# Patient Record
Sex: FEMALE | Race: WHITE | Employment: UNEMPLOYED | URBAN - METROPOLITAN AREA
[De-identification: names, ages, dates, MRNs, and addresses within clinical notes are randomized per-mention and may not be internally consistent; named-entity substitution may affect disease eponyms.]

---

## 2017-03-20 ENCOUNTER — OFFICE VISIT (OUTPATIENT)
Dept: FAMILY MEDICINE CLINIC | Age: 52
End: 2017-03-20
Payer: MEDICARE

## 2017-03-20 VITALS
SYSTOLIC BLOOD PRESSURE: 106 MMHG | DIASTOLIC BLOOD PRESSURE: 58 MMHG | OXYGEN SATURATION: 100 % | RESPIRATION RATE: 16 BRPM | WEIGHT: 173 LBS | BODY MASS INDEX: 28.82 KG/M2 | HEART RATE: 82 BPM | HEIGHT: 65 IN | TEMPERATURE: 98 F

## 2017-03-20 DIAGNOSIS — Z12.12 SCREENING FOR RECTAL CANCER: ICD-10-CM

## 2017-03-20 DIAGNOSIS — Z12.31 ENCOUNTER FOR MAMMOGRAM TO ESTABLISH BASELINE MAMMOGRAM: ICD-10-CM

## 2017-03-20 DIAGNOSIS — K21.9 GASTROESOPHAGEAL REFLUX DISEASE, ESOPHAGITIS PRESENCE NOT SPECIFIED: Primary | ICD-10-CM

## 2017-03-20 DIAGNOSIS — H91.93 DEAF, BILATERAL: ICD-10-CM

## 2017-03-20 DIAGNOSIS — F32.A DEPRESSION, UNSPECIFIED DEPRESSION TYPE: ICD-10-CM

## 2017-03-20 DIAGNOSIS — Z00.00 HEALTH MAINTENANCE EXAMINATION: ICD-10-CM

## 2017-03-20 DIAGNOSIS — Z13.9 SCREENING: ICD-10-CM

## 2017-03-20 DIAGNOSIS — Z11.59 NEED FOR HEPATITIS C SCREENING TEST: ICD-10-CM

## 2017-03-20 DIAGNOSIS — Z13.220 SCREENING FOR HYPERLIPIDEMIA: ICD-10-CM

## 2017-03-20 LAB — HBA1C MFR BLD: 5.5 %

## 2017-03-20 PROCEDURE — 99203 OFFICE O/P NEW LOW 30 MIN: CPT | Performed by: NURSE PRACTITIONER

## 2017-03-20 PROCEDURE — 1036F TOBACCO NON-USER: CPT | Performed by: NURSE PRACTITIONER

## 2017-03-20 PROCEDURE — 83036 HEMOGLOBIN GLYCOSYLATED A1C: CPT | Performed by: NURSE PRACTITIONER

## 2017-03-20 PROCEDURE — 3014F SCREEN MAMMO DOC REV: CPT | Performed by: NURSE PRACTITIONER

## 2017-03-20 PROCEDURE — G8427 DOCREV CUR MEDS BY ELIG CLIN: HCPCS | Performed by: NURSE PRACTITIONER

## 2017-03-20 PROCEDURE — G8419 CALC BMI OUT NRM PARAM NOF/U: HCPCS | Performed by: NURSE PRACTITIONER

## 2017-03-20 PROCEDURE — G8484 FLU IMMUNIZE NO ADMIN: HCPCS | Performed by: NURSE PRACTITIONER

## 2017-03-20 PROCEDURE — 3017F COLORECTAL CA SCREEN DOC REV: CPT | Performed by: NURSE PRACTITIONER

## 2017-03-20 RX ORDER — FLUOXETINE HYDROCHLORIDE 40 MG/1
CAPSULE ORAL
COMMUNITY
Start: 2016-11-25 | End: 2021-01-11

## 2017-03-20 RX ORDER — OMEPRAZOLE 20 MG/1
20 CAPSULE, DELAYED RELEASE ORAL DAILY
COMMUNITY
End: 2017-08-15 | Stop reason: SDUPTHER

## 2017-03-20 ASSESSMENT — ENCOUNTER SYMPTOMS
DIARRHEA: 0
EYE DISCHARGE: 0
SINUS PRESSURE: 0
ABDOMINAL PAIN: 0
BLOOD IN STOOL: 0
CHEST TIGHTNESS: 0
RHINORRHEA: 0
COUGH: 0
CONSTIPATION: 0
SHORTNESS OF BREATH: 0

## 2017-03-20 ASSESSMENT — PATIENT HEALTH QUESTIONNAIRE - PHQ9
SUM OF ALL RESPONSES TO PHQ9 QUESTIONS 1 & 2: 0
2. FEELING DOWN, DEPRESSED OR HOPELESS: 0
SUM OF ALL RESPONSES TO PHQ QUESTIONS 1-9: 0

## 2017-03-21 ENCOUNTER — HOSPITAL ENCOUNTER (OUTPATIENT)
Age: 52
Setting detail: SPECIMEN
Discharge: HOME | End: 2017-03-21
Payer: MEDICARE

## 2017-03-21 DIAGNOSIS — Z12.31 ENCOUNTER FOR MAMMOGRAM TO ESTABLISH BASELINE MAMMOGRAM: ICD-10-CM

## 2017-03-21 DIAGNOSIS — Z11.59 NEED FOR HEPATITIS C SCREENING TEST: ICD-10-CM

## 2017-03-21 DIAGNOSIS — Z13.220 SCREENING FOR HYPERLIPIDEMIA: ICD-10-CM

## 2017-03-21 DIAGNOSIS — Z13.9 SCREENING: ICD-10-CM

## 2017-03-21 LAB
ALBUMIN SERPL-MCNC: 4.2 G/DL (ref 3.5–5.2)
ALBUMIN/GLOBULIN RATIO: 1.4 (ref 1–2.5)
ALP BLD-CCNC: 85 U/L (ref 35–104)
ALT SERPL-CCNC: 18 U/L (ref 5–33)
ANION GAP SERPL CALCULATED.3IONS-SCNC: 11 MMOL/L (ref 9–17)
AST SERPL-CCNC: 23 U/L
BILIRUB SERPL-MCNC: 0.52 MG/DL (ref 0.3–1.2)
BUN BLDV-MCNC: 9 MG/DL (ref 6–20)
BUN/CREAT BLD: ABNORMAL (ref 9–20)
CALCIUM SERPL-MCNC: 9.3 MG/DL (ref 8.6–10.4)
CHLORIDE BLD-SCNC: 97 MMOL/L (ref 98–107)
CHOLESTEROL/HDL RATIO: 2.7
CHOLESTEROL: 195 MG/DL
CO2: 29 MMOL/L (ref 20–31)
CREAT SERPL-MCNC: 0.61 MG/DL (ref 0.5–0.9)
GFR AFRICAN AMERICAN: >60 ML/MIN
GFR NON-AFRICAN AMERICAN: >60 ML/MIN
GFR SERPL CREATININE-BSD FRML MDRD: ABNORMAL ML/MIN/{1.73_M2}
GFR SERPL CREATININE-BSD FRML MDRD: ABNORMAL ML/MIN/{1.73_M2}
GLUCOSE BLD-MCNC: 87 MG/DL (ref 70–99)
HCT VFR BLD CALC: 35.5 % (ref 36–46)
HDLC SERPL-MCNC: 72 MG/DL
HEMOGLOBIN: 12.3 G/DL (ref 12–16)
HEPATITIS C ANTIBODY: NONREACTIVE
LDL CHOLESTEROL: 110 MG/DL (ref 0–130)
MCH RBC QN AUTO: 29.5 PG (ref 26–34)
MCHC RBC AUTO-ENTMCNC: 34.5 G/DL (ref 31–37)
MCV RBC AUTO: 85.6 FL (ref 80–100)
PDW BLD-RTO: 12.6 % (ref 12.5–15.4)
PLATELET # BLD: 263 K/UL (ref 140–450)
PMV BLD AUTO: 8 FL (ref 6–12)
POTASSIUM SERPL-SCNC: 4.4 MMOL/L (ref 3.7–5.3)
RBC # BLD: 4.15 M/UL (ref 4–5.2)
SODIUM BLD-SCNC: 137 MMOL/L (ref 135–144)
TOTAL PROTEIN: 7.3 G/DL (ref 6.4–8.3)
TRIGL SERPL-MCNC: 63 MG/DL
TSH SERPL DL<=0.05 MIU/L-ACNC: 1.94 MIU/L (ref 0.3–5)
VLDLC SERPL CALC-MCNC: NORMAL MG/DL (ref 1–30)
WBC # BLD: 5.3 K/UL (ref 3.5–11)

## 2017-03-24 DIAGNOSIS — Z12.12 SCREENING FOR RECTAL CANCER: ICD-10-CM

## 2017-03-24 LAB
CONTROL: PRESENT
HEMOCCULT STL QL: NEGATIVE

## 2017-03-24 PROCEDURE — 82274 ASSAY TEST FOR BLOOD FECAL: CPT | Performed by: NURSE PRACTITIONER

## 2017-04-06 ENCOUNTER — HOSPITAL ENCOUNTER (OUTPATIENT)
Dept: MAMMOGRAPHY | Age: 52
Discharge: HOME | End: 2017-04-06
Payer: MEDICARE

## 2017-04-06 PROCEDURE — G0202 SCR MAMMO BI INCL CAD: HCPCS

## 2017-05-01 PROBLEM — H91.90 DEAF: Status: ACTIVE | Noted: 2017-05-01

## 2017-05-15 ENCOUNTER — OFFICE VISIT (OUTPATIENT)
Dept: FAMILY MEDICINE CLINIC | Age: 52
End: 2017-05-15
Payer: MEDICARE

## 2017-05-15 VITALS
BODY MASS INDEX: 29.26 KG/M2 | HEIGHT: 65 IN | OXYGEN SATURATION: 100 % | RESPIRATION RATE: 18 BRPM | HEART RATE: 85 BPM | DIASTOLIC BLOOD PRESSURE: 69 MMHG | WEIGHT: 175.6 LBS | SYSTOLIC BLOOD PRESSURE: 112 MMHG

## 2017-05-15 DIAGNOSIS — M79.645 PAIN IN FINGER OF BOTH HANDS: ICD-10-CM

## 2017-05-15 DIAGNOSIS — M79.644 PAIN IN FINGER OF BOTH HANDS: ICD-10-CM

## 2017-05-15 DIAGNOSIS — K21.9 GASTROESOPHAGEAL REFLUX DISEASE, ESOPHAGITIS PRESENCE NOT SPECIFIED: Primary | ICD-10-CM

## 2017-05-15 PROCEDURE — 1036F TOBACCO NON-USER: CPT | Performed by: NURSE PRACTITIONER

## 2017-05-15 PROCEDURE — 99214 OFFICE O/P EST MOD 30 MIN: CPT | Performed by: NURSE PRACTITIONER

## 2017-05-15 PROCEDURE — G8419 CALC BMI OUT NRM PARAM NOF/U: HCPCS | Performed by: NURSE PRACTITIONER

## 2017-05-15 PROCEDURE — 3017F COLORECTAL CA SCREEN DOC REV: CPT | Performed by: NURSE PRACTITIONER

## 2017-05-15 PROCEDURE — G8427 DOCREV CUR MEDS BY ELIG CLIN: HCPCS | Performed by: NURSE PRACTITIONER

## 2017-05-15 PROCEDURE — 3014F SCREEN MAMMO DOC REV: CPT | Performed by: NURSE PRACTITIONER

## 2017-05-15 RX ORDER — MELOXICAM 7.5 MG/1
7.5 TABLET ORAL DAILY
Qty: 30 TABLET | Refills: 3 | Status: SHIPPED | OUTPATIENT
Start: 2017-05-15 | End: 2017-08-15 | Stop reason: SDUPTHER

## 2017-05-15 RX ORDER — RANITIDINE 150 MG/1
150 TABLET ORAL NIGHTLY
Qty: 30 TABLET | Refills: 3 | Status: SHIPPED | OUTPATIENT
Start: 2017-05-15 | End: 2017-08-15 | Stop reason: SDUPTHER

## 2017-05-15 ASSESSMENT — ENCOUNTER SYMPTOMS
CONSTIPATION: 0
RHINORRHEA: 0
DIARRHEA: 0
EYE DISCHARGE: 0
CHEST TIGHTNESS: 0
SHORTNESS OF BREATH: 0
COUGH: 0
ABDOMINAL PAIN: 0
BLOOD IN STOOL: 0
SINUS PRESSURE: 0

## 2017-08-15 ENCOUNTER — OFFICE VISIT (OUTPATIENT)
Dept: FAMILY MEDICINE CLINIC | Age: 52
End: 2017-08-15
Payer: MEDICARE

## 2017-08-15 VITALS
DIASTOLIC BLOOD PRESSURE: 67 MMHG | TEMPERATURE: 97.1 F | HEIGHT: 65 IN | RESPIRATION RATE: 18 BRPM | SYSTOLIC BLOOD PRESSURE: 111 MMHG | HEART RATE: 88 BPM | OXYGEN SATURATION: 98 % | BODY MASS INDEX: 29.16 KG/M2 | WEIGHT: 175 LBS

## 2017-08-15 DIAGNOSIS — Z76.0 MEDICATION REFILL: ICD-10-CM

## 2017-08-15 DIAGNOSIS — K21.9 GASTROESOPHAGEAL REFLUX DISEASE, ESOPHAGITIS PRESENCE NOT SPECIFIED: Primary | ICD-10-CM

## 2017-08-15 PROCEDURE — 3014F SCREEN MAMMO DOC REV: CPT | Performed by: NURSE PRACTITIONER

## 2017-08-15 PROCEDURE — 3017F COLORECTAL CA SCREEN DOC REV: CPT | Performed by: NURSE PRACTITIONER

## 2017-08-15 PROCEDURE — 1036F TOBACCO NON-USER: CPT | Performed by: NURSE PRACTITIONER

## 2017-08-15 PROCEDURE — G8427 DOCREV CUR MEDS BY ELIG CLIN: HCPCS | Performed by: NURSE PRACTITIONER

## 2017-08-15 PROCEDURE — 99213 OFFICE O/P EST LOW 20 MIN: CPT | Performed by: NURSE PRACTITIONER

## 2017-08-15 PROCEDURE — G8419 CALC BMI OUT NRM PARAM NOF/U: HCPCS | Performed by: NURSE PRACTITIONER

## 2017-08-15 RX ORDER — OMEPRAZOLE 40 MG/1
40 CAPSULE, DELAYED RELEASE ORAL DAILY
Qty: 30 CAPSULE | Refills: 2 | Status: SHIPPED
Start: 2017-08-15 | End: 2020-02-24 | Stop reason: DRUGHIGH

## 2017-08-15 RX ORDER — MELOXICAM 7.5 MG/1
7.5 TABLET ORAL DAILY
Qty: 30 TABLET | Refills: 3 | Status: SHIPPED | OUTPATIENT
Start: 2017-08-15 | End: 2020-01-22

## 2017-08-15 RX ORDER — RANITIDINE 150 MG/1
150 TABLET ORAL NIGHTLY
Qty: 30 TABLET | Refills: 3 | Status: SHIPPED | OUTPATIENT
Start: 2017-08-15 | End: 2020-01-22

## 2017-08-15 ASSESSMENT — ENCOUNTER SYMPTOMS
CONSTIPATION: 0
RHINORRHEA: 0
SHORTNESS OF BREATH: 0
SINUS PRESSURE: 0
COUGH: 0
DIARRHEA: 0
BLOOD IN STOOL: 0
ABDOMINAL PAIN: 0
CHEST TIGHTNESS: 0

## 2017-08-23 ENCOUNTER — OFFICE VISIT (OUTPATIENT)
Dept: GASTROENTEROLOGY | Age: 52
End: 2017-08-23
Payer: MEDICARE

## 2017-08-23 VITALS
OXYGEN SATURATION: 97 % | SYSTOLIC BLOOD PRESSURE: 111 MMHG | HEART RATE: 92 BPM | BODY MASS INDEX: 28.82 KG/M2 | WEIGHT: 173 LBS | DIASTOLIC BLOOD PRESSURE: 70 MMHG | TEMPERATURE: 97.7 F

## 2017-08-23 DIAGNOSIS — Z12.11 SCREEN FOR COLON CANCER: Primary | ICD-10-CM

## 2017-08-23 DIAGNOSIS — K21.9 GASTROESOPHAGEAL REFLUX DISEASE, ESOPHAGITIS PRESENCE NOT SPECIFIED: ICD-10-CM

## 2017-08-23 PROCEDURE — 3014F SCREEN MAMMO DOC REV: CPT | Performed by: INTERNAL MEDICINE

## 2017-08-23 PROCEDURE — 1036F TOBACCO NON-USER: CPT | Performed by: INTERNAL MEDICINE

## 2017-08-23 PROCEDURE — G8427 DOCREV CUR MEDS BY ELIG CLIN: HCPCS | Performed by: INTERNAL MEDICINE

## 2017-08-23 PROCEDURE — 3017F COLORECTAL CA SCREEN DOC REV: CPT | Performed by: INTERNAL MEDICINE

## 2017-08-23 PROCEDURE — 99204 OFFICE O/P NEW MOD 45 MIN: CPT | Performed by: INTERNAL MEDICINE

## 2017-08-23 PROCEDURE — G8419 CALC BMI OUT NRM PARAM NOF/U: HCPCS | Performed by: INTERNAL MEDICINE

## 2017-08-23 ASSESSMENT — ENCOUNTER SYMPTOMS
BLOOD IN STOOL: 0
VOMITING: 0
CONSTIPATION: 0
BACK PAIN: 1
ANAL BLEEDING: 0
NAUSEA: 0
DIARRHEA: 0
RESPIRATORY NEGATIVE: 1
ABDOMINAL DISTENTION: 0
TROUBLE SWALLOWING: 0
ABDOMINAL PAIN: 1

## 2017-08-24 RX ORDER — POLYETHYLENE GLYCOL 3350 17 G/17G
POWDER, FOR SOLUTION ORAL
Qty: 255 G | Refills: 0 | Status: SHIPPED | OUTPATIENT
Start: 2017-08-24 | End: 2017-09-22

## 2017-09-20 ENCOUNTER — ANESTHESIA EVENT (OUTPATIENT)
Dept: OPERATING ROOM | Age: 52
End: 2017-09-20
Payer: MEDICARE

## 2017-09-21 ENCOUNTER — HOSPITAL ENCOUNTER (OUTPATIENT)
Age: 52
Setting detail: OUTPATIENT SURGERY
Discharge: HOME | End: 2017-09-21
Attending: INTERNAL MEDICINE | Admitting: INTERNAL MEDICINE
Payer: MEDICARE

## 2017-09-21 ENCOUNTER — ANESTHESIA (OUTPATIENT)
Dept: OPERATING ROOM | Age: 52
End: 2017-09-21
Payer: MEDICARE

## 2017-09-21 VITALS
RESPIRATION RATE: 14 BRPM | DIASTOLIC BLOOD PRESSURE: 63 MMHG | OXYGEN SATURATION: 100 % | SYSTOLIC BLOOD PRESSURE: 111 MMHG

## 2017-09-21 VITALS
BODY MASS INDEX: 27.81 KG/M2 | DIASTOLIC BLOOD PRESSURE: 58 MMHG | HEIGHT: 65 IN | WEIGHT: 166.89 LBS | OXYGEN SATURATION: 100 % | SYSTOLIC BLOOD PRESSURE: 107 MMHG | RESPIRATION RATE: 13 BRPM | TEMPERATURE: 97.3 F | HEART RATE: 70 BPM

## 2017-09-21 PROCEDURE — 6360000002 HC RX W HCPCS: Performed by: NURSE ANESTHETIST, CERTIFIED REGISTERED

## 2017-09-21 PROCEDURE — 45380 COLONOSCOPY AND BIOPSY: CPT | Performed by: INTERNAL MEDICINE

## 2017-09-21 PROCEDURE — 88305 TISSUE EXAM BY PATHOLOGIST: CPT

## 2017-09-21 PROCEDURE — 3700000001 HC ADD 15 MINUTES (ANESTHESIA): Performed by: INTERNAL MEDICINE

## 2017-09-21 PROCEDURE — 3609012400 HC EGD TRANSORAL BIOPSY SINGLE/MULTIPLE: Performed by: INTERNAL MEDICINE

## 2017-09-21 PROCEDURE — 7100000010 HC PHASE II RECOVERY - FIRST 15 MIN: Performed by: INTERNAL MEDICINE

## 2017-09-21 PROCEDURE — 3609010400 HC COLONOSCOPY POLYPECTOMY HOT BIOPSY: Performed by: INTERNAL MEDICINE

## 2017-09-21 PROCEDURE — 7100000011 HC PHASE II RECOVERY - ADDTL 15 MIN: Performed by: INTERNAL MEDICINE

## 2017-09-21 PROCEDURE — 2500000003 HC RX 250 WO HCPCS: Performed by: NURSE ANESTHETIST, CERTIFIED REGISTERED

## 2017-09-21 PROCEDURE — 43235 EGD DIAGNOSTIC BRUSH WASH: CPT | Performed by: INTERNAL MEDICINE

## 2017-09-21 PROCEDURE — 2580000003 HC RX 258: Performed by: ANESTHESIOLOGY

## 2017-09-21 PROCEDURE — 3700000000 HC ANESTHESIA ATTENDED CARE: Performed by: INTERNAL MEDICINE

## 2017-09-21 RX ORDER — FENTANYL CITRATE 50 UG/ML
25 INJECTION, SOLUTION INTRAMUSCULAR; INTRAVENOUS EVERY 5 MIN PRN
Status: DISCONTINUED | OUTPATIENT
Start: 2017-09-21 | End: 2017-09-21 | Stop reason: HOSPADM

## 2017-09-21 RX ORDER — LIDOCAINE HYDROCHLORIDE 20 MG/ML
INJECTION, SOLUTION EPIDURAL; INFILTRATION; INTRACAUDAL; PERINEURAL PRN
Status: DISCONTINUED | OUTPATIENT
Start: 2017-09-21 | End: 2017-09-21

## 2017-09-21 RX ORDER — SODIUM CHLORIDE 0.9 % (FLUSH) 0.9 %
10 SYRINGE (ML) INJECTION PRN
Status: DISCONTINUED | OUTPATIENT
Start: 2017-09-21 | End: 2017-09-21 | Stop reason: HOSPADM

## 2017-09-21 RX ORDER — LIDOCAINE HYDROCHLORIDE 20 MG/ML
INJECTION, SOLUTION EPIDURAL; INFILTRATION; INTRACAUDAL; PERINEURAL PRN
Status: DISCONTINUED | OUTPATIENT
Start: 2017-09-21 | End: 2017-09-21 | Stop reason: SDUPTHER

## 2017-09-21 RX ORDER — MEPERIDINE HYDROCHLORIDE 25 MG/ML
12.5 INJECTION INTRAMUSCULAR; INTRAVENOUS; SUBCUTANEOUS EVERY 5 MIN PRN
Status: DISCONTINUED | OUTPATIENT
Start: 2017-09-21 | End: 2017-09-21 | Stop reason: HOSPADM

## 2017-09-21 RX ORDER — LABETALOL HYDROCHLORIDE 5 MG/ML
5 INJECTION, SOLUTION INTRAVENOUS EVERY 10 MIN PRN
Status: DISCONTINUED | OUTPATIENT
Start: 2017-09-21 | End: 2017-09-21 | Stop reason: HOSPADM

## 2017-09-21 RX ORDER — SODIUM CHLORIDE, SODIUM LACTATE, POTASSIUM CHLORIDE, CALCIUM CHLORIDE 600; 310; 30; 20 MG/100ML; MG/100ML; MG/100ML; MG/100ML
INJECTION, SOLUTION INTRAVENOUS CONTINUOUS
Status: DISCONTINUED | OUTPATIENT
Start: 2017-09-21 | End: 2017-09-21 | Stop reason: HOSPADM

## 2017-09-21 RX ORDER — SODIUM CHLORIDE 0.9 % (FLUSH) 0.9 %
10 SYRINGE (ML) INJECTION EVERY 12 HOURS SCHEDULED
Status: DISCONTINUED | OUTPATIENT
Start: 2017-09-21 | End: 2017-09-21 | Stop reason: HOSPADM

## 2017-09-21 RX ORDER — LIDOCAINE HYDROCHLORIDE 10 MG/ML
1 INJECTION, SOLUTION EPIDURAL; INFILTRATION; INTRACAUDAL; PERINEURAL
Status: DISCONTINUED | OUTPATIENT
Start: 2017-09-21 | End: 2017-09-21 | Stop reason: HOSPADM

## 2017-09-21 RX ORDER — FENTANYL CITRATE 50 UG/ML
INJECTION, SOLUTION INTRAMUSCULAR; INTRAVENOUS PRN
Status: DISCONTINUED | OUTPATIENT
Start: 2017-09-21 | End: 2017-09-21 | Stop reason: SDUPTHER

## 2017-09-21 RX ORDER — HYDROMORPHONE HCL 110MG/55ML
0.5 PATIENT CONTROLLED ANALGESIA SYRINGE INTRAVENOUS EVERY 5 MIN PRN
Status: DISCONTINUED | OUTPATIENT
Start: 2017-09-21 | End: 2017-09-21 | Stop reason: HOSPADM

## 2017-09-21 RX ORDER — ONDANSETRON 2 MG/ML
4 INJECTION INTRAMUSCULAR; INTRAVENOUS
Status: DISCONTINUED | OUTPATIENT
Start: 2017-09-21 | End: 2017-09-21 | Stop reason: HOSPADM

## 2017-09-21 RX ORDER — SODIUM CHLORIDE 9 MG/ML
INJECTION, SOLUTION INTRAVENOUS CONTINUOUS
Status: DISCONTINUED | OUTPATIENT
Start: 2017-09-21 | End: 2017-09-21

## 2017-09-21 RX ORDER — PROPOFOL 10 MG/ML
INJECTION, EMULSION INTRAVENOUS PRN
Status: DISCONTINUED | OUTPATIENT
Start: 2017-09-21 | End: 2017-09-21 | Stop reason: SDUPTHER

## 2017-09-21 RX ADMIN — PROPOFOL 50 MG: 10 INJECTION, EMULSION INTRAVENOUS at 09:38

## 2017-09-21 RX ADMIN — FENTANYL CITRATE 25 MCG: 50 INJECTION, SOLUTION INTRAMUSCULAR; INTRAVENOUS at 09:29

## 2017-09-21 RX ADMIN — SODIUM CHLORIDE, POTASSIUM CHLORIDE, SODIUM LACTATE AND CALCIUM CHLORIDE: 600; 310; 30; 20 INJECTION, SOLUTION INTRAVENOUS at 09:22

## 2017-09-21 RX ADMIN — FENTANYL CITRATE 50 MCG: 50 INJECTION, SOLUTION INTRAMUSCULAR; INTRAVENOUS at 09:24

## 2017-09-21 RX ADMIN — FENTANYL CITRATE 25 MCG: 50 INJECTION, SOLUTION INTRAMUSCULAR; INTRAVENOUS at 09:34

## 2017-09-21 RX ADMIN — PROPOFOL 50 MG: 10 INJECTION, EMULSION INTRAVENOUS at 09:45

## 2017-09-21 RX ADMIN — PROPOFOL 50 MG: 10 INJECTION, EMULSION INTRAVENOUS at 09:32

## 2017-09-21 RX ADMIN — LIDOCAINE HYDROCHLORIDE 100 MG: 20 INJECTION, SOLUTION EPIDURAL; INFILTRATION; INTRACAUDAL; PERINEURAL at 09:24

## 2017-09-21 RX ADMIN — PROPOFOL 100 MG: 10 INJECTION, EMULSION INTRAVENOUS at 09:24

## 2017-09-21 ASSESSMENT — PAIN DESCRIPTION - ORIENTATION
ORIENTATION: UPPER

## 2017-09-21 ASSESSMENT — PAIN DESCRIPTION - LOCATION
LOCATION: ABDOMEN
LOCATION: ABDOMEN
LOCATION: RECTUM
LOCATION: ABDOMEN

## 2017-09-21 ASSESSMENT — PAIN DESCRIPTION - DESCRIPTORS
DESCRIPTORS: BURNING

## 2017-09-21 ASSESSMENT — PAIN SCALES - GENERAL
PAINLEVEL_OUTOF10: 0
PAINLEVEL_OUTOF10: 4
PAINLEVEL_OUTOF10: 1
PAINLEVEL_OUTOF10: 4
PAINLEVEL_OUTOF10: 4

## 2017-09-21 ASSESSMENT — PAIN - FUNCTIONAL ASSESSMENT: PAIN_FUNCTIONAL_ASSESSMENT: 0-10

## 2017-09-21 ASSESSMENT — PAIN DESCRIPTION - PAIN TYPE
TYPE: SURGICAL PAIN

## 2017-09-22 LAB — SURGICAL PATHOLOGY REPORT: NORMAL

## 2020-01-22 ENCOUNTER — OFFICE VISIT (OUTPATIENT)
Dept: FAMILY MEDICINE CLINIC | Age: 55
End: 2020-01-22
Payer: MEDICARE

## 2020-01-22 VITALS
WEIGHT: 174.4 LBS | HEART RATE: 84 BPM | OXYGEN SATURATION: 97 % | BODY MASS INDEX: 29.02 KG/M2 | SYSTOLIC BLOOD PRESSURE: 122 MMHG | DIASTOLIC BLOOD PRESSURE: 70 MMHG | TEMPERATURE: 98.2 F

## 2020-01-22 PROCEDURE — G8419 CALC BMI OUT NRM PARAM NOF/U: HCPCS | Performed by: NURSE PRACTITIONER

## 2020-01-22 PROCEDURE — 99214 OFFICE O/P EST MOD 30 MIN: CPT | Performed by: NURSE PRACTITIONER

## 2020-01-22 PROCEDURE — 4004F PT TOBACCO SCREEN RCVD TLK: CPT | Performed by: NURSE PRACTITIONER

## 2020-01-22 PROCEDURE — G8484 FLU IMMUNIZE NO ADMIN: HCPCS | Performed by: NURSE PRACTITIONER

## 2020-01-22 PROCEDURE — 3017F COLORECTAL CA SCREEN DOC REV: CPT | Performed by: NURSE PRACTITIONER

## 2020-01-22 PROCEDURE — G8427 DOCREV CUR MEDS BY ELIG CLIN: HCPCS | Performed by: NURSE PRACTITIONER

## 2020-01-22 RX ORDER — AMITRIPTYLINE HYDROCHLORIDE 25 MG/1
25 TABLET, FILM COATED ORAL NIGHTLY
Qty: 30 TABLET | Refills: 3 | Status: SHIPPED | OUTPATIENT
Start: 2020-01-22 | End: 2020-02-24 | Stop reason: SDUPTHER

## 2020-01-22 ASSESSMENT — PATIENT HEALTH QUESTIONNAIRE - PHQ9
SUM OF ALL RESPONSES TO PHQ QUESTIONS 1-9: 0
SUM OF ALL RESPONSES TO PHQ9 QUESTIONS 1 & 2: 0
1. LITTLE INTEREST OR PLEASURE IN DOING THINGS: 0
2. FEELING DOWN, DEPRESSED OR HOPELESS: 0
SUM OF ALL RESPONSES TO PHQ QUESTIONS 1-9: 0

## 2020-01-22 NOTE — PROGRESS NOTES
Patient is present complaining with  of hand pain x10 months  Patient states they are cold all the time and has tinglinging    Patient states she has had a cough   states she has always had this cough, few years  States she does not like to use an inhaler    Pharmacy and medication reviewed with patient

## 2020-01-22 NOTE — PROGRESS NOTES
9/21/2017    EGD BIOPSY performed by Donell De La Paz MD at [address]  09/21/2017    mild esophagitis     Family History   Problem Relation Age of Onset    Cancer Mother         throat    Cancer Father         colon- advanced age   Jennifer Muff Cancer Sister         hodgkins     Social History     Tobacco Use    Smoking status: Never Smoker   Substance Use Topics    Alcohol use: No      No Known Allergies    Subjective:      Review of Systems   Constitutional: Negative for chills and fever. Respiratory: Negative for cough and shortness of breath. Cardiovascular: Negative for chest pain, palpitations and leg swelling. Musculoskeletal: Positive for arthralgias. Neurological: Positive for headaches. Other pertinent ROS in HPI  Objective:     /70 (Site: Left Upper Arm, Position: Sitting, Cuff Size: Large Adult)   Pulse 84   Temp 98.2 °F (36.8 °C) (Oral)   Wt 174 lb 6.4 oz (79.1 kg)   SpO2 97%   BMI 29.02 kg/m²    Physical Exam  Constitutional:       Appearance: She is well-developed. HENT:      Right Ear: External ear normal.      Left Ear: External ear normal.      Nose: Nose normal.   Neck:      Musculoskeletal: Full passive range of motion without pain and normal range of motion. Cardiovascular:      Rate and Rhythm: Normal rate and regular rhythm. Heart sounds: Normal heart sounds, S1 normal and S2 normal.   Pulmonary:      Effort: Pulmonary effort is normal. No respiratory distress. Breath sounds: Normal breath sounds. Musculoskeletal: Normal range of motion. General: No deformity. Skin:     General: Skin is warm and dry. Neurological:      Mental Status: She is alert and oriented to person, place, and time. Comments: II: Normal tracking, no evidence of hemianopia or other visual field defect. III, IV, & VI: EOM intact, no ptosis, no nystagmus seen. Pupils are equal and reactive to light.    V: Facial sensation is intact to light touch/temperature. VII: no facial asymmetry, facial movement intact. VIII: hearing is abnormal, pt is deaf  IX-X: Palate elevates in the midline. XI: Normal trapezius strength and/or movement. XII: Tongue movement is normal, position is midline and no fasciculations are observed. Assessment/PLAN   1. Chronic intractable headache, unspecified headache type  rto in 1 month   - amitriptyline (ELAVIL) 25 MG tablet; Take 1 tablet by mouth nightly  Dispense: 30 tablet; Refill: 3    2. Encounter for screening mammogram for breast cancer    - [facility] Digital Screen Bilateral [[id number]]; Future    3. Bilateral deafness      4. Bilateral hand pain    - amitriptyline (ELAVIL) 25 MG tablet; Take 1 tablet by mouth nightly  Dispense: 30 tablet; Refill: 3      RTO if symptoms worsen or fail to improve  Pt agreeable with plan      Patient given educational materials - see patientinstructions. Discussed use, benefit, and side effects of prescribed medications. All patient questions answered. Pt voiced understanding. Reviewed health maintenance. Instructed to continue current medications, diet andexercise. 1.  Theodore Marcial received counseling on the following healthy behaviors: nutrition, exercise and medication adherence  2. Patient given educationalmaterials when available - see patient instructions when applicable  3. Discussed use, benefit, and side effects of prescribed medications. Barriersto medication compliance addressed. All patient questions answered. Pt voiced understanding. 4.  Reviewed prior labs and health maintenance when applicable. 5.  Continue current medications, diet and exercise.     CompletedRefills   Requested Prescriptions     Signed Prescriptions Disp Refills    amitriptyline (ELAVIL) 25 MG tablet 30 tablet 3     Sig: Take 1 tablet by mouth nightly         Electronically signed by Re Layton CNP on 2/2/2020 at 4:38 PM

## 2020-02-02 ASSESSMENT — ENCOUNTER SYMPTOMS
SHORTNESS OF BREATH: 0
COUGH: 0

## 2020-02-24 ENCOUNTER — OFFICE VISIT (OUTPATIENT)
Dept: FAMILY MEDICINE CLINIC | Age: 55
End: 2020-02-24
Payer: MEDICARE

## 2020-02-24 VITALS
DIASTOLIC BLOOD PRESSURE: 74 MMHG | BODY MASS INDEX: 30.7 KG/M2 | HEART RATE: 111 BPM | TEMPERATURE: 97.1 F | SYSTOLIC BLOOD PRESSURE: 126 MMHG | WEIGHT: 179.8 LBS | HEIGHT: 64 IN | OXYGEN SATURATION: 98 %

## 2020-02-24 PROCEDURE — G8417 CALC BMI ABV UP PARAM F/U: HCPCS | Performed by: NURSE PRACTITIONER

## 2020-02-24 PROCEDURE — 99213 OFFICE O/P EST LOW 20 MIN: CPT | Performed by: NURSE PRACTITIONER

## 2020-02-24 PROCEDURE — 3017F COLORECTAL CA SCREEN DOC REV: CPT | Performed by: NURSE PRACTITIONER

## 2020-02-24 PROCEDURE — G8427 DOCREV CUR MEDS BY ELIG CLIN: HCPCS | Performed by: NURSE PRACTITIONER

## 2020-02-24 PROCEDURE — 4004F PT TOBACCO SCREEN RCVD TLK: CPT | Performed by: NURSE PRACTITIONER

## 2020-02-24 PROCEDURE — G8484 FLU IMMUNIZE NO ADMIN: HCPCS | Performed by: NURSE PRACTITIONER

## 2020-02-24 RX ORDER — OMEPRAZOLE 20 MG/1
CAPSULE, DELAYED RELEASE ORAL
COMMUNITY
Start: 2020-02-06 | End: 2021-01-11 | Stop reason: SDUPTHER

## 2020-02-24 RX ORDER — OLOPATADINE HYDROCHLORIDE 1 MG/ML
1 SOLUTION/ DROPS OPHTHALMIC 2 TIMES DAILY
Qty: 1 BOTTLE | Refills: 2 | Status: SHIPPED | OUTPATIENT
Start: 2020-02-24 | End: 2020-03-25

## 2020-02-24 RX ORDER — AMITRIPTYLINE HYDROCHLORIDE 50 MG/1
50 TABLET, FILM COATED ORAL NIGHTLY
Qty: 30 TABLET | Refills: 5 | Status: SHIPPED | OUTPATIENT
Start: 2020-02-24 | End: 2020-10-23 | Stop reason: SDUPTHER

## 2020-02-24 ASSESSMENT — ENCOUNTER SYMPTOMS
SHORTNESS OF BREATH: 0
COUGH: 0

## 2020-02-24 NOTE — PROGRESS NOTES
[address] [place]  [phone]    Pedro Gibbs is a 54 y.o. female who presents today for her  medical conditions/complaints as noted below. Pedro Gibbs is c/o of 1 Month Follow-Up and Headache  . HPI:     HPI     All communication done through interpretor. Headaches still occurring at night. They are somewhat improved. Headaches- happening at night. Has tried ibuprofen, this is not helping. Headaches for many months. Head hearts in the back , sometimes her nose starts to hurt and it goes up into her head. They are gone in the morning- seem to occur in the evenings. Eye exam is up to date. No further neurological complaints. Drinks coke all day long. Left eye hurts. Cousin statse it happens often, they bought otc pink eye meds, this helps it go away but it comes back. No drainage. It is dry, it does itch. It goes back and forth between right and left eyes . Sleep Apnea Screening Questionnaire ([facility])    Do you SNORE loudly (louder than talking or loud enough to be heard through closed doors)? YES / NO: No  Do you often feel TIRED, fatigued, or sleepy during daytime? YES / NO: No  Has anyone OBSERVED you stop breathing during your sleep? YES / NO: No  Do you have or are you being treated for high blood PRESSURE? YES / NO: No  BMI more than 35kg/m2? YES / NO: No  AGE over 48years old? YES / NO: Yes  NECK circumference > 16 inches (40cm)? YES / NO: No  GENDER: Male? YES / NO: No    Total score      2      Nursing note reviewed and discussed with patient. Patient's medications, allergies, past medical, surgical, social and family histories were reviewed and updated asappropriate.     Current Outpatient Medications   Medication Sig Dispense Refill    omeprazole (PRILOSEC) 20 MG delayed release capsule       amitriptyline (ELAVIL) 50 MG tablet Take 1 tablet by mouth nightly 30 tablet 5    olopatadine (PATANOL) 0.1 % ophthalmic solution Place 1 drop into both eyes 2 times daily 1 Bottle 2    FLUoxetine (PROZAC) 40 MG capsule TAKE ONE CAPSULE BY MOUTH DAILY       No current facility-administered medications for this visit. Past Medical History:   Diagnosis Date    Asthma     Deaf     Depression     GERD (gastroesophageal reflux disease)       Past Surgical History:   Procedure Laterality Date    CARPAL TUNNEL RELEASE Bilateral     COLONOSCOPY      COLONOSCOPY  09/21/2017     The mucosal exam was normal. The left colon was tortuous. A 4 mm polyp was removed from transverse colon by cold biopsy forceps    HYSTERECTOMY      [facility] COLSC FLX W/REMOVAL LESION BY HOT BX FORCEPS N/A 9/21/2017    COLONOSCOPY POLYPECTOMY COLD BIOPSY performed by Duane Villalobos MD at [address] EGD TRANSORAL BIOPSY SINGLE/MULTIPLE N/A 9/21/2017    EGD BIOPSY performed by Duane Villalobos MD at [address] [facility]  09/21/2017    mild esophagitis     Family History   Problem Relation Age of Onset    Cancer Mother         throat    Cancer Father         colon- advanced age   Adelene Slope Cancer Sister         hodgkins     Social History     Tobacco Use    Smoking status: Never Smoker   Substance Use Topics    Alcohol use: No      No Known Allergies    Subjective:      Review of Systems   Constitutional: Negative for chills and fever. Respiratory: Negative for cough and shortness of breath. Cardiovascular: Negative for chest pain, palpitations and leg swelling. Neurological: Positive for headaches. Other pertinent ROS in HPI  Objective:     /74 (Site: Left Upper Arm, Position: Sitting, Cuff Size: Large Adult)   Pulse 111   Temp 97.1 °F (36.2 °C) (Oral)   Ht 5' 4\" (1.626 m)   Wt 179 lb 12.8 oz (81.6 kg)   SpO2 98%   BMI 30.86 kg/m²    Physical Exam  Constitutional:       Appearance: She is well-developed.    HENT:      Right Ear: External ear normal.      Left Ear: medications. All patient questions answered. Pt voiced understanding. Reviewed health maintenance. Instructed to continue current medications, diet andexercise. 1.  Sofy Matthews received counseling on the following healthy behaviors: nutrition, exercise and medication adherence  2. Patient given educationalmaterials when available - see patient instructions when applicable  3. Discussed use, benefit, and side effects of prescribed medications. Barriersto medication compliance addressed. All patient questions answered. Pt voiced understanding. 4.  Reviewed prior labs and health maintenance when applicable. 5.  Continue current medications, diet and exercise. CompletedRefills   Requested Prescriptions     Signed Prescriptions Disp Refills    amitriptyline (ELAVIL) 50 MG tablet 30 tablet 5     Sig: Take 1 tablet by mouth nightly    olopatadine (PATANOL) 0.1 % ophthalmic solution 1 Bottle 2     Sig: Place 1 drop into both eyes 2 times daily       This note was transcribed using dictation with Dragon services. Efforts were made to correct any errors but some words may be misinterpreted.     Electronically signed by [place] CNP on 2/24/2020 at 2:53 PM

## 2020-02-24 NOTE — PROGRESS NOTES
Patient is present for 1 month f/u for headaches with    Patient states the headaches have improved  Patient states she is getting them daily but has noticed a difference with the medication    Patient states she starting having pain in her right yesterday  Patient states it is pink and does itch    Patient has overdue mammogram    Pharmacy and medication reviewed with patient

## 2020-03-27 ENCOUNTER — TELEPHONE (OUTPATIENT)
Dept: FAMILY MEDICINE CLINIC | Age: 55
End: 2020-03-27

## 2020-10-23 ENCOUNTER — TELEPHONE (OUTPATIENT)
Dept: FAMILY MEDICINE CLINIC | Age: 55
End: 2020-10-23

## 2020-10-23 RX ORDER — AMITRIPTYLINE HYDROCHLORIDE 50 MG/1
TABLET, FILM COATED ORAL
Qty: 30 TABLET | Refills: 5 | Status: SHIPPED | OUTPATIENT
Start: 2020-10-23 | End: 2021-08-13

## 2020-10-23 NOTE — TELEPHONE ENCOUNTER
Last visit: 2/24/20  Last Med refill: 2/24/20  Does patient have enough medication for 72 hours: Yes    Next Visit Date:  No future appointments.     Health Maintenance   Topic Date Due    HIV screen  01/21/1980    DTaP/Tdap/Td vaccine (1 - Tdap) 01/21/1984    Cervical cancer screen  01/21/1986    Breast cancer screen  04/06/2019    Annual Wellness Visit (AWV)  06/20/2019    Diabetes screen  03/20/2020    Flu vaccine (1) 09/01/2020    Shingles Vaccine (1 of 2) 01/22/2021 (Originally 1/21/2015)    Lipid screen  03/21/2022    Colon cancer screen colonoscopy  09/21/2022    Hepatitis C screen  Completed    Hepatitis A vaccine  Aged Out    Hepatitis B vaccine  Aged Out    Hib vaccine  Aged Out    Meningococcal (ACWY) vaccine  Aged Out    Pneumococcal 0-64 years Vaccine  Aged Out       Hemoglobin A1C (%)   Date Value   03/20/2017 5.5             ( goal A1C is < 7)   No results found for: LABMICR  LDL Cholesterol (mg/dL)   Date Value   03/21/2017 110       (goal LDL is <100)   AST (U/L)   Date Value   03/21/2017 23     ALT (U/L)   Date Value   03/21/2017 18     BUN (mg/dL)   Date Value   03/21/2017 9     BP Readings from Last 3 Encounters:   02/24/20 126/74   01/22/20 122/70   09/21/17 111/63          (goal 120/80)    All Future Testing planned in [facility]  Lab Frequency Next Occurrence   [facility] Digital Screen Bilateral [[id number]] Once 10/17/2020               Patient Active Problem List:     Deaf     GERD (gastroesophageal reflux disease)     Polyp of colon     Gastroesophageal reflux disease with esophagitis

## 2020-11-19 ENCOUNTER — TELEPHONE (OUTPATIENT)
Dept: ADMINISTRATIVE | Age: 55
End: 2020-11-19

## 2021-01-11 ENCOUNTER — OFFICE VISIT (OUTPATIENT)
Dept: FAMILY MEDICINE CLINIC | Age: 56
End: 2021-01-11
Payer: MEDICARE

## 2021-01-11 VITALS
DIASTOLIC BLOOD PRESSURE: 80 MMHG | BODY MASS INDEX: 32.41 KG/M2 | WEIGHT: 188.8 LBS | HEART RATE: 108 BPM | SYSTOLIC BLOOD PRESSURE: 134 MMHG | OXYGEN SATURATION: 98 %

## 2021-01-11 DIAGNOSIS — Z13.220 SCREENING, LIPID: ICD-10-CM

## 2021-01-11 DIAGNOSIS — Z13.1 SCREENING FOR DIABETES MELLITUS: ICD-10-CM

## 2021-01-11 DIAGNOSIS — Z13.29 SCREENING FOR THYROID DISORDER: ICD-10-CM

## 2021-01-11 DIAGNOSIS — Z79.899 MEDICATION MANAGEMENT: ICD-10-CM

## 2021-01-11 DIAGNOSIS — R79.9 ABNORMAL FINDING OF BLOOD CHEMISTRY, UNSPECIFIED: ICD-10-CM

## 2021-01-11 DIAGNOSIS — M79.671 RIGHT FOOT PAIN: ICD-10-CM

## 2021-01-11 DIAGNOSIS — Z23 NEED FOR INFLUENZA VACCINATION: ICD-10-CM

## 2021-01-11 DIAGNOSIS — Z12.31 ENCOUNTER FOR SCREENING MAMMOGRAM FOR BREAST CANCER: Primary | ICD-10-CM

## 2021-01-11 PROCEDURE — 4004F PT TOBACCO SCREEN RCVD TLK: CPT | Performed by: NURSE PRACTITIONER

## 2021-01-11 PROCEDURE — 3017F COLORECTAL CA SCREEN DOC REV: CPT | Performed by: NURSE PRACTITIONER

## 2021-01-11 PROCEDURE — 90688 IIV4 VACCINE SPLT 0.5 ML IM: CPT | Performed by: NURSE PRACTITIONER

## 2021-01-11 PROCEDURE — 99214 OFFICE O/P EST MOD 30 MIN: CPT | Performed by: NURSE PRACTITIONER

## 2021-01-11 PROCEDURE — G8427 DOCREV CUR MEDS BY ELIG CLIN: HCPCS | Performed by: NURSE PRACTITIONER

## 2021-01-11 PROCEDURE — G8417 CALC BMI ABV UP PARAM F/U: HCPCS | Performed by: NURSE PRACTITIONER

## 2021-01-11 PROCEDURE — G8482 FLU IMMUNIZE ORDER/ADMIN: HCPCS | Performed by: NURSE PRACTITIONER

## 2021-01-11 PROCEDURE — G0008 ADMIN INFLUENZA VIRUS VAC: HCPCS | Performed by: NURSE PRACTITIONER

## 2021-01-11 RX ORDER — OMEPRAZOLE 20 MG/1
20 CAPSULE, DELAYED RELEASE ORAL DAILY
Qty: 30 CAPSULE | Refills: 5 | Status: SHIPPED | OUTPATIENT
Start: 2021-01-11 | End: 2021-09-13 | Stop reason: SDUPTHER

## 2021-01-11 ASSESSMENT — ENCOUNTER SYMPTOMS
COUGH: 0
SHORTNESS OF BREATH: 0

## 2021-01-11 NOTE — PROGRESS NOTES
[address] [place]  [phone]    Stephenie Perkins is a 54 y.o. female who presents today for her  medical conditions/complaints as noted below. Stephenie Perkins is c/o of Medication Refill  . HPI:     HPI   All communication done through interpretor on ipad. She is now living with another sister. Her right foot is hurting her. Hurts on the bottom of the right foot and across the side. This has been going on for three months. Does not know that she had an injury. Standing makes the foot hurt. Thinks maybe she has bad shoes. Shoes are old, more than 18 months. Would like to switch anxiety meds. She feels jumpy and anxious. Denies any depression. She denies any thoughts of hurting herself. She feels safe where she is. She has a hand written note from her sister suggesting that she be tried on a different medication. Amitriptyline- this does help her. Nursing note reviewed and discussed with patient. Patient's medications, allergies, past medical, surgical, social and family histories were reviewed and updated asappropriate. Current Outpatient Medications   Medication Sig Dispense Refill    omeprazole (PRILOSEC) 20 MG delayed release capsule Take 1 capsule by mouth Daily 30 capsule 5    sertraline (ZOLOFT) 50 MG tablet Take 1 tablet by mouth daily 30 tablet 2    amitriptyline (ELAVIL) 50 MG tablet TAKE ONE TABLET BY MOUTH ONCE NIGHTLY 30 tablet 5     No current facility-administered medications for this visit. Past Medical History:   Diagnosis Date    Asthma     Deaf     Depression     GERD (gastroesophageal reflux disease)       Past Surgical History:   Procedure Laterality Date    CARPAL TUNNEL RELEASE Bilateral     COLONOSCOPY      COLONOSCOPY  09/21/2017     The mucosal exam was normal. The left colon was tortuous.  A 4 mm polyp was removed from transverse colon by cold biopsy forceps  HYSTERECTOMY      [facility] COLSC FLX W/REMOVAL LESION BY HOT BX FORCEPS N/A 9/21/2017    COLONOSCOPY POLYPECTOMY COLD BIOPSY performed by Phong Hernandes MD at [address] EGD TRANSORAL BIOPSY SINGLE/MULTIPLE N/A 9/21/2017    EGD BIOPSY performed by Phong Hernandes MD at [address] [facility]  09/21/2017    mild esophagitis     Family History   Problem Relation Age of Onset    Cancer Mother         throat    Cancer Father         colon- advanced age   [employer] Cancer Sister         hodgkins     Social History     Tobacco Use    Smoking status: Never Smoker   Substance Use Topics    Alcohol use: No      No Known Allergies    Subjective:      Review of Systems   Constitutional: Negative for chills and fever. Respiratory: Negative for cough and shortness of breath. Cardiovascular: Negative for chest pain, palpitations and leg swelling. Psychiatric/Behavioral: Negative for dysphoric mood. The patient is nervous/anxious. Other pertinent ROS in HPI  Objective:     /80 (Site: Left Upper Arm, Position: Sitting, Cuff Size: Large Adult)   Pulse 108   Wt 188 lb 12.8 oz (85.6 kg)   SpO2 98%   BMI 32.41 kg/m²    Physical Exam  Constitutional:       Appearance: She is well-developed. HENT:      Right Ear: External ear normal.      Left Ear: External ear normal.      Nose: Nose normal.   Neck:      Musculoskeletal: Full passive range of motion without pain and normal range of motion. Cardiovascular:      Rate and Rhythm: Normal rate and regular rhythm. Pulses:           Radial pulses are 2+ on the right side and 2+ on the left side. Dorsalis pedis pulses are 2+ on the right side and 2+ on the left side. Posterior tibial pulses are 2+ on the right side and 2+ on the left side. Heart sounds: Normal heart sounds, S1 normal and S2 normal.   Pulmonary:      Effort: Pulmonary effort is normal. No respiratory distress. Breath sounds: Normal breath sounds. Musculoskeletal: Normal range of motion. General: No deformity. Skin:     General: Skin is warm and dry. Neurological:      Mental Status: She is alert and oriented to person, place, and time. Assessment/PLAN   1. Right foot pain  - XR FOOT RIGHT (MIN 3 VIEWS); Future    2. Encounter for screening mammogram for breast cancer    - [facility] DIGITAL SCREEN W OR WO CAD BILATERAL; Future    3. Need for influenza vaccination    - INFLUENZA, QUADV, 3 YRS AND OLDER, IM, MDV, 0.5ML (AFLURIA QUADV)    4. Screening, lipid    - Lipid, Fasting; Future    5. Screening for diabetes mellitus    - Hemoglobin A1C; Future    6. Screening for thyroid disorder    - TSH; Future    7. Medication management    - CBC; Future  - Comprehensive Metabolic Panel; Future    8. Abnormal finding of blood chemistry, unspecified     - Hemoglobin A1C; Future      RTO if symptoms worsen or fail to improve  Pt agreeable with plan      Patient given educational materials - see patientinstructions. Discussed use, benefit, and side effects of prescribed medications. All patient questions answered. Pt voiced understanding. Reviewed health maintenance. Instructed to continue current medications, diet andexercise. 1.  Wilfredo Radha received counseling on the following healthy behaviors: nutrition, exercise and medication adherence  2. Patient given educationalmaterials when available - see patient instructions when applicable  3. Discussed use, benefit, and side effects of prescribed medications. Barriersto medication compliance addressed. All patient questions answered. Pt voiced understanding. 4.  Reviewed prior labs and health maintenance when applicable. 5.  Continue current medications, diet and exercise.     CompletedRefills   Requested Prescriptions     Signed Prescriptions Disp Refills    omeprazole (PRILOSEC) 20 MG delayed release capsule 30 capsule 5     Sig: Take 1 capsule by mouth Daily

## 2021-01-11 NOTE — PROGRESS NOTES
Patient is present for med refills  Patient is present with brother n law     Vaccine Information Sheet, \"Influenza - Inactivated\"  given to So Siddiqi, or parent/legal guardian of  So Siddiqi and verbalized understanding. Patient responses:    Have you ever had a reaction to a flu vaccine? No  Do you have any current illness? No  Have you ever had [place] Syndrome? No  Do you have a serious allergy to any of the following: Neomycin, Polymyxin, Thimerosal, eggs or egg products? No    Flu vaccine given per order. Please see immunization tab. Risks and benefits explained. Current VIS given.

## 2021-01-29 ENCOUNTER — HOSPITAL ENCOUNTER (OUTPATIENT)
Facility: CLINIC | Age: 56
Discharge: HOME | End: 2021-01-31
Payer: MEDICARE

## 2021-01-29 ENCOUNTER — HOSPITAL ENCOUNTER (OUTPATIENT)
Age: 56
Setting detail: SPECIMEN
Discharge: HOME | End: 2021-01-29
Payer: MEDICARE

## 2021-01-29 ENCOUNTER — HOSPITAL ENCOUNTER (OUTPATIENT)
Dept: GENERAL RADIOLOGY | Facility: CLINIC | Age: 56
Discharge: HOME | End: 2021-01-31
Payer: MEDICARE

## 2021-01-29 ENCOUNTER — TELEPHONE (OUTPATIENT)
Dept: FAMILY MEDICINE CLINIC | Age: 56
End: 2021-01-29

## 2021-01-29 DIAGNOSIS — Z13.1 SCREENING FOR DIABETES MELLITUS: ICD-10-CM

## 2021-01-29 DIAGNOSIS — Z13.29 SCREENING FOR THYROID DISORDER: ICD-10-CM

## 2021-01-29 DIAGNOSIS — M79.671 RIGHT FOOT PAIN: Primary | ICD-10-CM

## 2021-01-29 DIAGNOSIS — Z13.220 SCREENING, LIPID: ICD-10-CM

## 2021-01-29 DIAGNOSIS — R79.9 ABNORMAL FINDING OF BLOOD CHEMISTRY, UNSPECIFIED: ICD-10-CM

## 2021-01-29 DIAGNOSIS — M79.671 RIGHT FOOT PAIN: ICD-10-CM

## 2021-01-29 DIAGNOSIS — Z79.899 MEDICATION MANAGEMENT: ICD-10-CM

## 2021-01-29 LAB
ALBUMIN SERPL-MCNC: 4.1 G/DL (ref 3.5–5.2)
ALBUMIN/GLOBULIN RATIO: 1.3 (ref 1–2.5)
ALP BLD-CCNC: 118 U/L (ref 35–104)
ALT SERPL-CCNC: 21 U/L (ref 5–33)
ANION GAP SERPL CALCULATED.3IONS-SCNC: 14 MMOL/L (ref 9–17)
AST SERPL-CCNC: 23 U/L
BILIRUB SERPL-MCNC: 0.19 MG/DL (ref 0.3–1.2)
BUN BLDV-MCNC: 14 MG/DL (ref 6–20)
BUN/CREAT BLD: ABNORMAL (ref 9–20)
CALCIUM SERPL-MCNC: 9.6 MG/DL (ref 8.6–10.4)
CHLORIDE BLD-SCNC: 104 MMOL/L (ref 98–107)
CHOLESTEROL, FASTING: 193 MG/DL
CHOLESTEROL/HDL RATIO: 2.6
CO2: 24 MMOL/L (ref 20–31)
CREAT SERPL-MCNC: 0.8 MG/DL (ref 0.5–0.9)
ESTIMATED AVERAGE GLUCOSE: 105 MG/DL
GFR AFRICAN AMERICAN: >60 ML/MIN
GFR NON-AFRICAN AMERICAN: >60 ML/MIN
GFR SERPL CREATININE-BSD FRML MDRD: ABNORMAL ML/MIN/{1.73_M2}
GFR SERPL CREATININE-BSD FRML MDRD: ABNORMAL ML/MIN/{1.73_M2}
GLUCOSE BLD-MCNC: 97 MG/DL (ref 70–99)
HBA1C MFR BLD: 5.3 % (ref 4–6)
HCT VFR BLD CALC: 41.2 % (ref 36.3–47.1)
HDLC SERPL-MCNC: 73 MG/DL
HEMOGLOBIN: 12.8 G/DL (ref 11.9–15.1)
LDL CHOLESTEROL: 100 MG/DL (ref 0–130)
MCH RBC QN AUTO: 29.4 PG (ref 25.2–33.5)
MCHC RBC AUTO-ENTMCNC: 31.1 G/DL (ref 28.4–34.8)
MCV RBC AUTO: 94.5 FL (ref 82.6–102.9)
NRBC AUTOMATED: 0 PER 100 WBC
PDW BLD-RTO: 11.8 % (ref 11.8–14.4)
PLATELET # BLD: 256 K/UL (ref 138–453)
PMV BLD AUTO: 9.2 FL (ref 8.1–13.5)
POTASSIUM SERPL-SCNC: 5.1 MMOL/L (ref 3.7–5.3)
RBC # BLD: 4.36 M/UL (ref 3.95–5.11)
SODIUM BLD-SCNC: 142 MMOL/L (ref 135–144)
TOTAL PROTEIN: 7.3 G/DL (ref 6.4–8.3)
TRIGLYCERIDE, FASTING: 101 MG/DL
TSH SERPL DL<=0.05 MIU/L-ACNC: 4.64 MIU/L (ref 0.3–5)
VLDLC SERPL CALC-MCNC: NORMAL MG/DL (ref 1–30)
WBC # BLD: 5.4 K/UL (ref 3.5–11.3)

## 2021-01-29 PROCEDURE — 73630 X-RAY EXAM OF FOOT: CPT

## 2021-04-23 ENCOUNTER — HOSPITAL ENCOUNTER (OUTPATIENT)
Dept: MAMMOGRAPHY | Age: 56
Discharge: HOME | End: 2021-04-25
Payer: MEDICARE

## 2021-04-23 DIAGNOSIS — Z12.31 ENCOUNTER FOR SCREENING MAMMOGRAM FOR BREAST CANCER: ICD-10-CM

## 2021-04-23 PROCEDURE — 77063 BREAST TOMOSYNTHESIS BI: CPT

## 2021-05-10 ENCOUNTER — OFFICE VISIT (OUTPATIENT)
Dept: FAMILY MEDICINE CLINIC | Age: 56
End: 2021-05-10
Payer: MEDICARE

## 2021-05-10 VITALS
BODY MASS INDEX: 32.27 KG/M2 | HEART RATE: 115 BPM | OXYGEN SATURATION: 95 % | WEIGHT: 188 LBS | SYSTOLIC BLOOD PRESSURE: 120 MMHG | DIASTOLIC BLOOD PRESSURE: 70 MMHG

## 2021-05-10 DIAGNOSIS — G89.29 CHRONIC RIGHT SHOULDER PAIN: Primary | ICD-10-CM

## 2021-05-10 DIAGNOSIS — M25.511 CHRONIC RIGHT SHOULDER PAIN: Primary | ICD-10-CM

## 2021-05-10 PROCEDURE — G8417 CALC BMI ABV UP PARAM F/U: HCPCS | Performed by: NURSE PRACTITIONER

## 2021-05-10 PROCEDURE — 99214 OFFICE O/P EST MOD 30 MIN: CPT | Performed by: NURSE PRACTITIONER

## 2021-05-10 PROCEDURE — G8427 DOCREV CUR MEDS BY ELIG CLIN: HCPCS | Performed by: NURSE PRACTITIONER

## 2021-05-10 PROCEDURE — 3017F COLORECTAL CA SCREEN DOC REV: CPT | Performed by: NURSE PRACTITIONER

## 2021-05-10 PROCEDURE — 4004F PT TOBACCO SCREEN RCVD TLK: CPT | Performed by: NURSE PRACTITIONER

## 2021-05-10 NOTE — PROGRESS NOTES
performed by Gary Ayala MD at [address] [facility] EGD TRANSORAL BIOPSY SINGLE/MULTIPLE N/A 9/21/2017    EGD BIOPSY performed by Gary Ayala MD at [address]  09/21/2017    mild esophagitis     Family History   Problem Relation Age of Onset    Cancer Mother         throat    Cancer Father         colon- advanced age   [employer] Cancer Sister         hodgkins     Social History     Tobacco Use    Smoking status: Never Smoker   Substance Use Topics    Alcohol use: No      No Known Allergies    Subjective:      Review of Systems   Constitutional: Negative for chills and fever. Respiratory: Negative for cough and shortness of breath. Cardiovascular: Negative for chest pain, palpitations and leg swelling. Musculoskeletal: Positive for arthralgias. Other pertinent ROS in HPI  Objective:     /70 (Site: Left Upper Arm, Position: Sitting, Cuff Size: Large Adult)   Pulse 115   Wt 188 lb (85.3 kg)   SpO2 95%   BMI 32.27 kg/m²    Physical Exam  Constitutional:       Appearance: She is well-developed. HENT:      Right Ear: External ear normal.      Left Ear: External ear normal.      Nose: Nose normal.   Cardiovascular:      Rate and Rhythm: Normal rate and regular rhythm. Heart sounds: Normal heart sounds, S1 normal and S2 normal.   Pulmonary:      Effort: Pulmonary effort is normal. No respiratory distress. Breath sounds: Normal breath sounds. Musculoskeletal:         General: No deformity. Normal range of motion. Right shoulder: Tenderness (posteriorly to palpation) present. No swelling, deformity, effusion or laceration. Normal range of motion. Cervical back: Full passive range of motion without pain and normal range of motion. Skin:     General: Skin is warm and dry. Neurological:      Mental Status: She is alert and oriented to person, place, and time. Assessment/PLAN   1.  Chronic right shoulder pain    - XR SHOULDER RIGHT (MIN 2 VIEWS); Future      RTO if symptoms worsen or fail to improve  Pt agreeable with plan      Patient given educational materials - see patientinstructions. Discussed use, benefit, and side effects of prescribed medications. All patient questions answered. Pt voiced understanding. Reviewed health maintenance. Instructed to continue current medications, diet andexercise. 1.  Cassidy Watts received counseling on the following healthy behaviors: medication adherence  2. Patient given educationalmaterials when available - see patient instructions when applicable  3. Discussed use, benefit, and side effects of prescribed medications. Barriersto medication compliance addressed. All patient questions answered. Pt voiced understanding. 4.  Reviewed prior labs and health maintenance when applicable. 5.  Continue current medications, diet and exercise. CompletedRefills   Requested Prescriptions      No prescriptions requested or ordered in this encounter       This note was transcribed using dictation with Dragon services. Efforts were made to correct any errors but some words may be misinterpreted.     Electronically signed by DEEPTI Contreras CNP, CNP on 5/19/2021 at 8:54 AM

## 2021-05-10 NOTE — PROGRESS NOTES
Patient is present complaining of right shoulder pain  States the pain travels down her arm and has pain in her back and neck  States she has numbness and tingling  States this has been going on for years  States he has had a fall in the past and she landed on this shoulder  Patient states she has pain when lifting her arm up  Patient states she is not taking anything for this

## 2021-05-11 ENCOUNTER — HOSPITAL ENCOUNTER (OUTPATIENT)
Facility: CLINIC | Age: 56
Discharge: HOME | End: 2021-05-13
Payer: MEDICARE

## 2021-05-11 ENCOUNTER — HOSPITAL ENCOUNTER (OUTPATIENT)
Dept: GENERAL RADIOLOGY | Facility: CLINIC | Age: 56
Discharge: HOME | End: 2021-05-13
Payer: MEDICARE

## 2021-05-11 DIAGNOSIS — M25.511 CHRONIC RIGHT SHOULDER PAIN: ICD-10-CM

## 2021-05-11 DIAGNOSIS — M25.511 CHRONIC RIGHT SHOULDER PAIN: Primary | ICD-10-CM

## 2021-05-11 DIAGNOSIS — G89.29 CHRONIC RIGHT SHOULDER PAIN: ICD-10-CM

## 2021-05-11 DIAGNOSIS — G89.29 CHRONIC RIGHT SHOULDER PAIN: Primary | ICD-10-CM

## 2021-05-11 PROCEDURE — 73030 X-RAY EXAM OF SHOULDER: CPT

## 2021-05-19 ASSESSMENT — ENCOUNTER SYMPTOMS
COUGH: 0
SHORTNESS OF BREATH: 0

## 2021-08-12 DIAGNOSIS — R51.9 CHRONIC INTRACTABLE HEADACHE, UNSPECIFIED HEADACHE TYPE: ICD-10-CM

## 2021-08-12 DIAGNOSIS — G89.29 CHRONIC INTRACTABLE HEADACHE, UNSPECIFIED HEADACHE TYPE: ICD-10-CM

## 2021-08-13 RX ORDER — AMITRIPTYLINE HYDROCHLORIDE 50 MG/1
TABLET, FILM COATED ORAL
Qty: 30 TABLET | Refills: 5 | Status: SHIPPED | OUTPATIENT
Start: 2021-08-13 | End: 2021-09-13 | Stop reason: SDUPTHER

## 2021-08-16 ENCOUNTER — HOSPITAL ENCOUNTER (OUTPATIENT)
Dept: PHYSICAL THERAPY | Age: 56
Setting detail: THERAPIES SERIES
Discharge: HOME | End: 2021-08-16
Payer: MEDICARE

## 2021-08-16 PROCEDURE — 97161 PT EVAL LOW COMPLEX 20 MIN: CPT

## 2021-08-16 PROCEDURE — 97140 MANUAL THERAPY 1/> REGIONS: CPT

## 2021-08-16 PROCEDURE — 97110 THERAPEUTIC EXERCISES: CPT

## 2021-08-16 NOTE — CONSULTS
[x] [facility]) [facility] CENTER &  Therapy  [address]  [phone]  F: [phone] [] [address]  [place] [address]   [address]  P: [phone]  F: [phone] [] [place]  [address]  P: [phone]  F: [phone] [] [address]  P: [phone]  F: [phone] [] [address]  [facility]   [address]   [place]: [phone]  F: [phone]      Physical Therapy Upper Extremity Evaluation    Date:  2021  Patient: Chase Joiner  : 1965  MRN: [id number]  Physician: CAREY Prakash     Insurance: [employer] for Life  Medical Diagnosis: chronic right shoulder pain    Rehab Codes: M 25.511, M 25.611, M 62.81, R 20.2  Onset Date: 2021     Next 's appt: 21    Subjective:   CC:  Cannot move right shoulder well. Hurts in bed, and wakes up with it really throbbing. Prefers right side to sleep on, but also sleeps on left. Ibuprofen for pain. Can do all tasks but it hurts. No pain with driving. Overhead tasks are painful- washing/brushing hair, donning bra, slight painful with wiping. HPI: (21)  No recent falls. Did fall in the winter when there was snow on the ground. Does not recall any heavy lifting. No new mattress or pillow. PMHx: [] Unremarkable [] Diabetes [] HTN  [] Pacemaker   [] MI/Heart Problems [] Cancer [x] Arthritis [x] Other: deaf, anxiety, depression, vision problems. Carpal tunnel surgery bilateral.                [x] Refer to full medical chart  In EPIC     Comorbidities:   [] Obesity [] Dialysis  [] N/A   [x] Asthma- has not used an inhaler in a long time. [] Dementia [x] Other: on the Autism spectrum.    [] Stroke [] Sleep apnea [x] Other: GERD   [] Vascular disease [] Rheumatic disease [x] Other: deaf     Tests: [x] X-Ray: 5/10/21   1. No acute osseous abnormality. 2. Mild AC joint osteoarthritis. [] MRI:  [] Other:    Medications: [x] Refer to full medical record [] None [] Other:  Allergies:      [x] Refer to full medical record [] None [] Other:    Function:  Hand Dominance  [x] Right  [] Left  Patient lives with: Sister   In what type of home []  One story   [x] Two story, bedroom upstairs   [] Split level   Number of stairs to enter Bathroom on main level. No steps to enter. With handrail on the []  Right to enter   [] Left to enter   Bathroom has a []  Tub only  [x] Tub/shower combo   [] Walk in shower    []  Grab bars   Washing machine is on [x]  Main level   [] Second level   [] Basement   Employer    Job Status []  Normal duty   [] Light duty   [] Off due to condition    []  Retired   [x] Not employed   [] Disability  [] Other:  []  Return to work:    Work activities/duties      ADL/IADL Previous level of function Current level of function Who currently assists the patient with task   Bathing  [x] Independent  [] Assist [x] Independent  [] Assist    Dress/grooming [x] Independent  [] Assist [x] Independent  [] Assist    Transfer/mobility [x] Independent  [] Assist [x] Independent  [] Assist    Feeding [x] Independent  [] Assist [x] Independent  [] Assist    Toileting [x] Independent  [] Assist [x] Independent  [] Assist    Driving [x] Independent  [] Assist [x] Independent  [] Assist    Housekeeping [x] Independent  [] Assist [x] Independent  [] Assist    Grocery shop/meal prep [x] Independent  [] Assist [x] Independent  [] Assist      Gait Prior level of function Current level of function    [x] Independent  [] Assist [x] Independent  [] Assist   Device: [x] Independent [x] Independent    [] Straight Cane [] Quad cane [] Straight Cane [] Quad cane    [] Standard walker [] Rolling walker   [] 4 wheeled walker [] Standard walker [] Rolling walker   [] 4 wheeled walker    [] Wheelchair [] Wheelchair     Pain:  [x] Yes  [] No Location: Right shoulder   Pain Rating: (0-10 scale) 6-8/10  Pain altered Tx:  [] Yes  [x] No  Action:    Symptoms:  [] Improving [x] Worsening [] Same  Better:  [] AM    [] PM    [] Sit    [] Rise/Sit    []Stand    [] Walk    [] Lying    [x] Other: resting it  Worse: [] AM    [] PM    [] Sit    [] Rise/Sit    []Stand    [] Walk    [] Lying    [] Bend                      [] Valsalva    [x] Other: using it, cold  Sleep: [] OK    [x] Disturbed    Objective:     ROM  °A/P STRENGTH TESTS (+/-) Right    Left Right Left Right Drop Arm    Sit Shld Flex 160 155 4  Sulcus Sign    Sit Shld Abd 135 118 4  Apprehension    Sit Shld IR     Yvonne pn   Shoulder Flex 170 168 4 4- Speeds -   Ext     Neer pn    130 4 4- Chen  pn   ER @ 0 45 90 90 90 4 4- Painful Arc    IR 90 90 4 4- Tinel      OBSERVATION No Deficit Deficit Not Tested Comments   Forward Head [] [x] []    Rounded Shoulders [] [x] []    Kyphosis [] [x] []    Scapular Height/Position [] [x] [] Protracted    Winging [x] [] []    Scapulohumeral Rhythm [] [x] [] Early scapular elevation   INSPECTION/PALPATION       SC/AC Joint [x] [] []    Supraspinatus [x] [] []    Biceps tendon/groove [] [x] [] Tender   Posterior shld [] [x] [] Tender   Subscapularis [] [x] [] Tender   NEUROLOGICAL       Cervical ROM/Quadrant [x] [] []    Reflexes [] [] [x]    Compression/Distraction [] [] [x]    Sensation [] [x] [] Tingling in hands with stretching of neck     Functional Test: UEFI Score: 24% functionally impaired     Comments:    Assessment:  Patient would benefit from skilled physical therapy services in order to: improved posture, improved ROM, improved strength, decreased tingling/numbness in arm, decreased pain in right shoulder and down right arm. Problems:    [x] ? Pain:  [x] ? ROM:  [x] ? Strength:  [x] ?  Function:  [] Other:      STG: (to be met in 6 treatments)  1. ? Pain: Right shoulder pain improve to 5/10 with active movements  2. ? ROM: Seated right shoulder abduction improve to 125 degrees without pain. 3. Supine right shoulder abduction improve to 150 degrees without pain. 4. ? Strength: Right shoulder strength improve to 4/5 without pain  5. ? Function: Patient to report decreased pain in right shoulder with laying supine. 6. Patient to be independent with home exercise program as demonstrated by performance with correct form without cues. LTG: (to be met in 12 treatments)  1. Right shoulder pain improve to 2/10 at max with heavy lifting  2. Patient able to complete all ADLs and IADLs without significant pain  3. Patient to sit with more upright posture. 4. UEFI score improve to lacking 20% or less. 5. Patient to report resolution of tingling in to right arm. Patient goals: \"Stop shoulder from hurting\"    Rehab Potential:  [x] Good  [] Fair  [] Poor   Suggested Professional Referral:  [x] No  [] Yes:  Barriers to Goal Achievement:  [x] No  [] Yes:  Domestic Concerns:  [x] No  [] Yes:    Pt. Education:  [x] Plans/Goals, Risks/Benefits discussed  [x] Home exercise program  Method of Education: [x] Verbal  [x] Demo  [x] Written -- see chart below  Comprehension of Education:  [] Verbalizes understanding. [] Demonstrates understanding. [x] Needs Review. [] Demonstrates/verbalizes understanding of HEP/Ed previously given.     Treatment Plan:  [x] Therapeutic Exercise   02576  [x] Iontophoresis: 4 mg/mL Dexamethasone Sodium Phosphate  mAmin  80741   [x] Therapeutic Activity  95349 [x] Vasopneumatic cold with compression  07477    [] Gait Training   85401 [x] Ultrasound   05086   [] Neuromuscular Re-education  99852 [x] Electrical Stimulation Unattended  78618   [x] Manual Therapy  11126 [] Electrical Stimulation Attended  85000   [x] Instruction in HEP  [] Lumbar/Cervical Traction  30397   [] Aquatic Therapy   01383 [x] Cold/hotpack    [] Massage 15874      [] Dry Needling, 1 or 2 muscles  45807   [] Biofeedback, first 15 minutes   62862  [] Biofeedback, additional 15 minutes   78900 [] Dry Needling, 3 or more muscles  68860     [x]  Medication allergies reviewed for use of    Dexamethasone Sodium Phosphate 4mg/ml     with iontophoresis treatments. Pt is not allergic. Frequency: 2 x/week for 12 visits    Todays Treatment:  Modalities:   Precautions: IPad interpretor for ASL. Per sister, on the autism spectrum. Exercises:  Exercise Reps/ Time Weight/ Level Comments   Scapular depression 10 x Blueberry HEP   Horizontal abduction 10 x Blueberry HEP, limited range   Retro shoulder rolls 10 x  HEP   Corner pec stretch 3 x 15 sec HEP   Upper trap stretch 3 x 15 sec HEP. Arm behind back, gently tilt head to side   Manual therapy 10 min  PROM to right shoulder, trigger point release to bilateral upper traps and cervical spine, CPROM, trigger point release to periscapular areas bilaterally. Reported slight tingling down right arm (in to upper biceps) with trigger point release. Other:  Sore after exercises, down arm. Encouraged patient to increase water intake today as well as use heat/cold as needed for discomfort. Specific Instructions for next treatment: UBE, pulleys, table slides, cane exercises. Progress to weighted exercises. Supine over half roll to stretch chest.  Posture exercises. Can use modalities as needed for pain control. Once signed, can use ionto to right shoulder.       Evaluation Complexity:  History (Personal factors, comorbidities) [] 0 [] 1-2 [x] 3+   Exam (limitations, restrictions) [] 1-2 [x] 3 [] 4+   Clinical presentation (progression) [x] Stable [] Evolving  [] Unstable   Decision Making [x] Low [] Moderate [] High    [x] Low Complexity [] Moderate Complexity [] High Complexity       Treatment Charges: Mins Units   [x] Evaluation       [x]  Low       []  Moderate       []  High 20 1   []  Modalities     [x]  Ther Exercise 15 1   [x]  Manual Therapy 10 1   []  Ther Activities     []  Aquatics     []  Vasocompression     []  Other       TOTAL TREATMENT TIME: 45    Time in: 1312    Time Out: 1400    Electronically signed by: Rosemary Joshua PT        Physician Signature:________________________________Date:__________________  By signing above or cosigning this note, I have reviewed this plan of care and certify a need for medically necessary rehabilitation services.      *PLEASE SIGN ABOVE AND FAX BACK ALL PAGES*

## 2021-08-18 ENCOUNTER — HOSPITAL ENCOUNTER (OUTPATIENT)
Dept: PHYSICAL THERAPY | Age: 56
Setting detail: THERAPIES SERIES
Discharge: HOME | End: 2021-08-18
Payer: MEDICARE

## 2021-08-18 PROCEDURE — 97140 MANUAL THERAPY 1/> REGIONS: CPT

## 2021-08-18 PROCEDURE — 97110 THERAPEUTIC EXERCISES: CPT

## 2021-08-18 NOTE — FLOWSHEET NOTE
[x] [facility]) [facility] CENTER &  Therapy  [address]  [phone]  F: [phone] [] [address]  [facility]   [address]  P: [phone]  F: [phone] [] [address]  [address]  P: [phone]  F: [phone] [] [address]  P: [phone]  F: [phone] [] [address]  [facility]   [address]   [place]: [phone]  F: [phone]      Physical Therapy Daily Treatment Note    Date:  2021  Patient Name:  Teagan Shukla    :  1965  MRN: [id number]  Physician: CAREY Mcbride                                     Insurance: [employer] Edinger for Life  Medical Diagnosis: chronic right shoulder pain                     Rehab Codes: M 25.511, M 25.611, M 62.81, R 20.2  Onset Date: 2021                        Next 's appt: 21   Visit# / total visits: ; Progress note for Medicare patient due at visit 10     Cancels/No Shows: 0/0    Subjective: : Joel William #[id number]  Pain:  [x] Yes  [] No  Location: R shoulder   Pain Rating: (0-10 scale) 6/10  Pain altered Tx:  [x] No  [] Yes  Action:  Comments: Patient reports her R shoulder pain is the same. Pain in behind and on top of the R shoulder. Objective:  Modalities:   Precautions: IPad interpretor for ASL. Per sister, on the autism spectrum  Exercises:  Exercise Reps/ Time Weight/ Level Comments   UBE 2/2     Pulleys 2/2           Table slides 10x  Flexion   Wall slides 10x ea  Flexion/ abd/ CW/ CCW   Scapular depression  Blueberry HEP   Ext 15x Blueberry    Rows 15x Blueberry     Horizontal abduction 10 x Lime Difficult   Retro shoulder rolls 10 x   HEP   Corner pec stretch 3 x 15 sec HEP   Upper trap stretch 3 x 15 sec HEP. Arm behind back, gently tilt head to side   Cervical flexion 5x      Cervical side bending 5x ea     Supine cane exercises       Flexion 10x     Chest press 10x     HAB 10x           Manual therapy 10 min   Trigger point release to bilateral upper traps and cervical spine, trigger point release to periscapular areas bilaterally. Other:      Treatment Charges: Mins Units   []  Modalities     [x]  Ther Exercise 50 3   [x]  Manual Therapy 10 1   []  Ther Activities     []  Aquatics     []  Vasocompression     []  Other     Total Treatment time 60 4       Assessment: [x] Progressing toward goals. Started with UBE and pulleys to increase ROM with good tolerance. Table slides introduced but quickly advanced to wall slides. Added thera band ext and rows to increase scapular retractors necessary for posture. Patient was tender with trigger point layer 2 so switched to Hyper Volt with improved tolerance. [] No change. [] Other:  [x] Patient would continue to benefit from skilled physical therapy services in order to: improved posture, improved ROM, improved strength, decreased tingling/numbness in arm, decreased pain in right shoulder and down right arm. Problems:    [x]? ? Pain:  [x]? ? ROM:  [x]? ? Strength:  [x]? ? Function:  []? Other:       STG: (to be met in 6 treatments)  1. ? Pain: Right shoulder pain improve to 5/10 with active movements  2. ? ROM: Seated right shoulder abduction improve to 125 degrees without pain. 3. Supine right shoulder abduction improve to 150 degrees without pain. 4. ? Strength: Right shoulder strength improve to 4/5 without pain  5. ? Function: Patient to report decreased pain in right shoulder with laying supine. 6. Patient to be independent with home exercise program as demonstrated by performance with correct form without cues.     LTG: (to be met in 12 treatments)  1. Right shoulder pain improve to 2/10 at max with heavy lifting  2.  Patient able to complete all ADLs and IADLs without significant pain  3. Patient to sit with more upright posture. 4. UEFI score improve to lacking 20% or less. 5. Patient to report resolution of tingling in to right arm. Patient goals: \"Stop shoulder from hurting\"     Rehab Potential:  [x]? Good  []? Fair  []? Poor    Suggested Professional Referral:  [x]? No  []? Yes:  Barriers to Goal Achievement:  [x]? No  []? Yes:  Domestic Concerns:  [x]? No  []? Yes:    Pt. Education:  [x] Yes  [] No  [x] Reviewed Prior HEP/Ed  Method of Education: [x] Verbal  [x] Demo  [] Written  Comprehension of Education:  [x] Verbalizes understanding. [x] Demonstrates understanding. [x] Needs review. [x] Demonstrates/verbalizes HEP/Ed previously given. Plan: [x] Continue current frequency toward long and short term goals. [x] Specific Instructions for subsequent treatments: Progress to weighted exercises. Supine over half roll to stretch chest.  Posture exercises. Can use modalities as needed for pain control. Once signed, can use ionto to right shoulder.        Time In: 1000           Time Out: 1100    Electronically signed by:  Nilsa Valiente PTA

## 2021-08-24 ENCOUNTER — HOSPITAL ENCOUNTER (OUTPATIENT)
Dept: PHYSICAL THERAPY | Age: 56
Setting detail: THERAPIES SERIES
Discharge: HOME | End: 2021-08-24
Payer: MEDICARE

## 2021-08-24 PROCEDURE — 97110 THERAPEUTIC EXERCISES: CPT

## 2021-08-24 PROCEDURE — 97033 APP MDLTY 1+IONTPHRSIS EA 15: CPT

## 2021-08-24 NOTE — FLOWSHEET NOTE
Per Dr Schuler Unlikely Lyrica causing elevated blood pressure. Have patient make follow-up appointment if blood pressures remain elevated.       MA left detailed message for pt regarding MD notes    [x] [facility]) [facility] CENTER &  Therapy  [address]  [phone]  F: [phone] [] [address]  [facility]   [address]  P: [phone]  F: [phone] [] [address]  [address]  P: [phone]  F: [phone] [] [address]  P: [phone]  F: [phone] [] [address]  [facility]   [address]   [place]: [phone]  F: [phone]      Physical Therapy Daily Treatment Note    Date:  2021  Patient Name:  Nicolas Fields    :  1965  MRN: [id number]  Physician: DEEPTI Coffey-ELINOR                                     Insurance: [employer] Klinefelter for Life  Medical Diagnosis: chronic right shoulder pain                     Rehab Codes: M 25.511, M 25.611, M 62.81, R 20.2  Onset Date: 2021                        Next 's appt: 21   Visit# / total visits: 3/12; Progress note for Medicare patient due at visit 10     Cancels/No Shows: 0/0    Subjective: :  Docia Dancer #[id number]  Pain:  [x] Yes  [] No  Location: R shoulder   Pain Rating: (0-10 scale) -/10  Pain altered Tx:  [x] No  [] Yes  Action:  Comments:  Patient reports pain in the R shoulder. Unable to give numerical number but states it hurts a lot. Objective:  Modalities: Extended wear Ionto patch, 1 ml, 4 hr wear, R shoulder, Review medication list  Precautions: IPad interpretor for ASL.   Per sister, on the autism spectrum  Exercises:  Exercise Reps/ Time Weight/ Level Comments   UBE 2/2     Pulleys 2/2           Table slides   Flexion   Wall slides 10x ea  Flexion/ abd/ CW/ CCW   Scapular depression  Blueberry HEP         Thera bands      Ext 10x  10x Blueberry  Lime    Rows 20x Lime     Horizontal abduction 20x Lime 2nd hole from top         Retro shoulder rolls 10 x      Corner pec stretch 3 x 15 sec    Upper trap stretch 3 x 15 sec  Arm behind back, gently tilt head to side   Levator stretch 3x20\"  Added 8.24   Cervical flexion 5x      Cervical side bending 5x ea     Cervical rotation 5x ea           Cane IR 10x  Added 8.24   Cane Ext 10x  Added 8.24   Shoulder flexion 10x  Added 8.24   Shoulder ABD 5x  Added 8.24   painful         Supine cane exercises       Flexion 20x     Chest press 20x     HAB 10x           PROM 5 min  Flexion/abd   Manual therapy  min   Trigger point release to bilateral upper traps and cervical spine, trigger point release to periscapular areas bilaterally. Other:      Treatment Charges: Mins Units   []  Modalities     [x]  Ther Exercise 47 3   []  Manual Therapy 5 0   []  Ther Activities     []  Aquatics     []  Vasocompression     [x]  Other Ionto 1/6 10 1   Total Treatment time 62 4       Assessment: [x] Progressing toward goals. Patient fatigued quickly with wall slides and thera bands, requiring quick rest breaks. Cane IR/Ext added to improve ROM with fair tolerance. Shoulder flexion/abd added against the wall for postural training with limited ROM to 90 degrees d/t pain. Performed PROM with high pain noted at end ranges. Additional time required for explanation of benefits of iontophoresis and review of allergies of meds. Extended wear of 4 hour ionto patch applied to R shoulder to decrease pain and inflammation. Instructed patient to remove patch at 3pm for clarification. [] No change. [] Other:  [x] Patient would continue to benefit from skilled physical therapy services in order to: improved posture, improved ROM, improved strength, decreased tingling/numbness in arm, decreased pain in right shoulder and down right arm. Problems:    [x]? ? Pain:  [x]? ? ROM:  [x]? ? Strength:  [x]? ? Function:  []?  Other:       STG: (to be met in 6 treatments)  1. ? Pain: Right shoulder pain

## 2021-08-26 ENCOUNTER — HOSPITAL ENCOUNTER (OUTPATIENT)
Dept: PHYSICAL THERAPY | Age: 56
Setting detail: THERAPIES SERIES
Discharge: HOME | End: 2021-08-26
Payer: MEDICARE

## 2021-08-26 PROCEDURE — 97110 THERAPEUTIC EXERCISES: CPT

## 2021-08-26 NOTE — FLOWSHEET NOTE
[x] [facility]) [facility] &  Therapy  [address]  [phone]  F: [phone] [] [address]  [place] [address]   [address]  P: [phone]  F: [phone] [] [place]  [address]  P: [phone]  F: [phone] [] [address]  P: [phone]  F: [phone] [] [address]  [facility]   [address]   [place]: [phone]  F: [phone]      Physical Therapy Daily Treatment Note    Date:  2021  Patient Name:  Sarah Lutz    :  1965  MRN: [id number]  Physician: DEEPTI Mcqueen-ELINOR                                     Insurance: [employer]a Leyden for Life  Medical Diagnosis: chronic right shoulder pain                     Rehab Codes: M 25.511, M 25.611, M 62.81, R 20.2  Onset Date: 2021                        Next 's appt: 21   Visit# / total visits: ; Progress note for Medicare patient due at visit 10     Cancels/No Shows: 0/0    Subjective: :  Slick Pineda #[id number]  Pain:  [x] Yes  [] No  Location: R shoulder   Pain Rating: (0-10 scale) -/10  Pain altered Tx:  [x] No  [] Yes  Action:  Comments:  Unable to give numerical number but states it hurts a lot. States she was very sore after therapy and in a lot of pain last session. Pt reports some relief with ionto after last session but states the pain came back right after removing patch. Objective:  Modalities: Extended wear Ionto patch, 1 ml, 4 hr wear, R shoulder, Review medication list  Precautions: IPad interpretor for ASL.   Per sister, on the autism spectrum  Exercises:  Exercise Reps/ Time Weight/ Level Comments   UBE 2/2     Pulleys 2/2  Flex/abd         Table slides   Flexion   Wall slides   Flexion/ abd/ CW/ CCW Scapular depression  Blueberry HEP         Thera bands      Ext 10x  10x Blueberry  Lime    Rows 20x Lime     Horizontal abduction 20x Lime 2nd hole from top         Seated      Retro shoulder rolls 10 x      Corner pec stretch 3 x 15 sec    Upper trap stretch 3 x 15 sec  Arm behind back, gently tilt head to side   Levator stretch 3x20\"  Added 8.24   Cervical flexion 5x      Cervical side bending 5x ea     Cervical rotation 5x ea  Discontinued R rotation due to pain         Standing      Cane IR 10x  Added 8.24 most painful 8/26   Cane Ext 10x  Added 8.24   Shoulder flexion 10x  Added 8.24   Shoulder ABD 5x  Added 8.24           Supine cane exercises       Flexion 20x     Chest press 20x     HAB 10x           PROM 5 min  Flexion/abd   Manual therapy    Trigger point release to bilateral upper traps and cervical spine, trigger point release to periscapular areas bilaterally. Other:      Treatment Charges: Mins Units   []  Modalities     [x]  Ther Exercise 41 3   []  Manual Therapy     []  Ther Activities     []  Aquatics     []  Vasocompression     []  Other Ionto 1/6     Total Treatment time 41 3       Assessment: [] Progressing toward goals. [x] No change. Continued with exercise log this date focusing treatment on PROM and AAROM with overall fair tolerance. Pt reports the most pain with wand IR this date. Pt mentions some frustration that she is still having pain in R shoulder despite performing exercises and stretches. Encouraged pt to continue with stretches and exercises as it may take time for the pain to decrease. Pt declines ionto this date, stating it only provided temporary relief. Will continue to progress as tolerated. [] Other:  [x] Patient would continue to benefit from skilled physical therapy services in order to: improved posture, improved ROM, improved strength, decreased tingling/numbness in arm, decreased pain in right shoulder and down right arm.     Problems:    [x]? ? Pain:  [x]? ? ROM:  [x]? ? Strength:  [x]? ? Function:  []? Other:       STG: (to be met in 6 treatments)  1. ? Pain: Right shoulder pain improve to 5/10 with active movements  2. ? ROM: Seated right shoulder abduction improve to 125 degrees without pain. 3. Supine right shoulder abduction improve to 150 degrees without pain. 4. ? Strength: Right shoulder strength improve to 4/5 without pain  5. ? Function: Patient to report decreased pain in right shoulder with laying supine. 6. Patient to be independent with home exercise program as demonstrated by performance with correct form without cues.     LTG: (to be met in 12 treatments)  1. Right shoulder pain improve to 2/10 at max with heavy lifting  2. Patient able to complete all ADLs and IADLs without significant pain  3. Patient to sit with more upright posture. 4. UEFI score improve to lacking 20% or less. 5. Patient to report resolution of tingling in to right arm. Patient goals: \"Stop shoulder from hurting\"     Rehab Potential:  [x]? Good  []? Fair  []? Poor    Suggested Professional Referral:  [x]? No  []? Yes:  Barriers to Goal Achievement:  [x]? No  []? Yes:  Domestic Concerns:  [x]? No  []? Yes:    Pt. Education:  [x] Yes  [] No  [x] Reviewed Prior HEP/Ed  Method of Education: [x] Verbal  [x] Demo  [] Written  Comprehension of Education:  [x] Verbalizes understanding. [x] Demonstrates understanding. [x] Needs review. [x] Demonstrates/verbalizes HEP/Ed previously given. Plan: [x] Continue current frequency toward long and short term goals. [x] Specific Instructions for subsequent treatments: Progress to weighted exercises.   Supine over half roll to stretch chest.       Time In: 2:00pm           Time Out: 2:45pm    Electronically signed by:  Segundo Valdes PTA

## 2021-08-31 ENCOUNTER — HOSPITAL ENCOUNTER (OUTPATIENT)
Dept: PHYSICAL THERAPY | Age: 56
Setting detail: THERAPIES SERIES
Discharge: HOME | End: 2021-08-31
Payer: MEDICARE

## 2021-08-31 PROCEDURE — 97110 THERAPEUTIC EXERCISES: CPT

## 2021-08-31 NOTE — FLOWSHEET NOTE
[x] [facility] &  Therapy  [address]  [phone]  F: [phone] [] [address]  [facility]   [address]  P: [phone]  F: [phone] [] [address]  [address]  P: [phone]  F: [phone] [] [address]  P: [phone]  F: [phone] [] [address]  [facility]   [address]   [place]: [phone]  F: [phone]      Physical Therapy Daily Treatment Note    Date:  2021  Patient Name:  Shikha Grider    :  1965  MRN: [id number]  Physician: DEEPTI Turcios-ELINOR                                     Insurance: Kelly Jammie for Life  Medical Diagnosis: chronic right shoulder pain                     Rehab Codes: M 25.511, M 25.611, M 62.81, R 20.2  Onset Date: 2021                        Next 's appt: 21   Visit# / total visits: ; Progress note for Medicare patient due at visit 10     Cancels/No Shows: 0/0    Subjective: :  Eric Jose #[id number]  Pain:  [x] Yes  [] No  Location: R shoulder   Pain Rating: (0-10 scale) 4/10  Pain altered Tx:  [x] No  [] Yes  Action:  Comments:  Patient was able to give numerical pain level of 4/10 after multiple attempts. Patient pointed to facial pain scale for better understanding. States there is no improvement with pain in the R shoulder. Objective:  Modalities: Extended wear Ionto patch, 1 ml, 4 hr wear, R shoulder, Review medication list 2/6  Precautions: IPad interpretor for ASL.   Per sister, on the autism spectrum  Exercises:  Exercise Reps/ Time Weight/ Level Comments   UBE 2/2     Pulleys 2/2  Flex/abd         Table slides   Flexion   Wall slides   Flexion/ abd/ CW/ CCW   Scapular depression  Blueberry HEP Prone on TG   Added 8.31   I, T's 10x     Ext 10x     Rows 10x           Thera bands      Ext 10x  10x Blueberry  Lime    Rows 20x Lime     Horizontal abduction 20x Lime 2nd hole from top         Seated      Retro shoulder rolls 10 x      Corner pec stretch 3 x 15 sec    Upper trap stretch 3 x 15 sec  Arm behind back, gently tilt head to side   Levator stretch 3x20\"     Cervical flexion 5x      Cervical side bending 5x ea     Cervical rotation 5x ea  Discontinued R rotation due to pain         Standing      Cane IR 10x     Cane Ext 10x     Shoulder flexion 10x     Shoulder ABD 5x           Supine cane exercises       Flexion 20x     Chest press 20x     HAB 10x           SL ER 10x  Added 8.31   SL abd 10x  Added 8.31         PROM 5 min  Flexion/abd   Manual therapy    Trigger point release to bilateral upper traps and cervical spine, trigger point release to periscapular areas bilaterally. Other:      Treatment Charges: Mins Units   []  Modalities     [x]  Ther Exercise 43 3   []  Manual Therapy     []  Ther Activities     []  Aquatics     []  Vasocompression     [x]  Other Ionto 2/6 5 0   Total Treatment time 48 3       Assessment: [x] Progressing toward goals. Good progression of scapular training with prone exercises. Increased reps with thera bands with good tolerance. Required min tapping over inferior border of scapula for correct muscle activation with improved technique. SL ER and abd added to increase ROM. Ended with ionto patch to decrease inflammation. [] No change. [] Other:  [x] Patient would continue to benefit from skilled physical therapy services in order to: improved posture, improved ROM, improved strength, decreased tingling/numbness in arm, decreased pain in right shoulder and down right arm. Problems:    [x]? ? Pain:  [x]? ? ROM:  [x]? ? Strength:  [x]? ? Function:  []?  Other:       STG: (to be met in 6 treatments)  1. ? Pain: Right shoulder pain improve to 5/10 with active movements  2. ? ROM: Seated right shoulder abduction improve to 125 degrees without pain. 3. Supine right shoulder abduction improve to 150 degrees without pain. 4. ? Strength: Right shoulder strength improve to 4/5 without pain  5. ? Function: Patient to report decreased pain in right shoulder with laying supine. 6. Patient to be independent with home exercise program as demonstrated by performance with correct form without cues.     LTG: (to be met in 12 treatments)  1. Right shoulder pain improve to 2/10 at max with heavy lifting  2. Patient able to complete all ADLs and IADLs without significant pain  3. Patient to sit with more upright posture. 4. UEFI score improve to lacking 20% or less. 5. Patient to report resolution of tingling in to right arm. Patient goals: \"Stop shoulder from hurting\"     Rehab Potential:  [x]? Good  []? Fair  []? Poor    Suggested Professional Referral:  [x]? No  []? Yes:  Barriers to Goal Achievement:  [x]? No  []? Yes:  Domestic Concerns:  [x]? No  []? Yes:    Pt. Education:  [x] Yes  [] No  [x] Reviewed Prior HEP/Ed  Method of Education: [x] Verbal  [x] Demo  [] Written  Comprehension of Education:  [x] Verbalizes understanding. [x] Demonstrates understanding. [x] Needs review. [x] Demonstrates/verbalizes HEP/Ed previously given. Plan: [x] Continue current frequency toward long and short term goals. [x] Specific Instructions for subsequent treatments: Progress to weighted exercises.   Supine over half roll to stretch chest.       Time In: 10:00 am           Time Out: 10:48 am    Electronically signed by:  Nancy Copeland PTA

## 2021-09-02 ENCOUNTER — HOSPITAL ENCOUNTER (OUTPATIENT)
Dept: PHYSICAL THERAPY | Age: 56
Setting detail: THERAPIES SERIES
Discharge: HOME | End: 2021-09-02
Payer: MEDICARE

## 2021-09-02 PROCEDURE — 97140 MANUAL THERAPY 1/> REGIONS: CPT

## 2021-09-02 PROCEDURE — 97110 THERAPEUTIC EXERCISES: CPT

## 2021-09-02 NOTE — FLOWSHEET NOTE
[x] [facility]) [facility] &  Therapy  [address]  [phone]  F: [phone] [] [address]  [place] [address]   [address]  P: [phone]  F: [phone] [] [place]  [address]  P: [phone]  F: [phone] [] [address]  P: [phone]  F: [phone] [] [address]  [facility]   [address]   [place]: [phone]  F: [phone]      Physical Therapy Daily Treatment Note    Date:  2021  Patient Name:  Dale Cannon    :  1965  MRN: [id number]  Physician: Santos Sagastume, APRN-CNP                                     Insurance: Michell Pollo for Life  Medical Diagnosis: chronic right shoulder pain                     Rehab Codes: M 25.511, M 25.611, M 62.81, R 20.2  Onset Date: 2021                        Next 's appt: 21   Visit# / total visits: ; Progress note for Medicare patient due at visit 10     Cancels/No Shows: 0/0    Subjective: :  Alejandro Bhatt #[id number]  Pain:  [x] Yes  [] No  Location: R shoulder   Pain Rating: (0-10 scale) 2/10  Pain altered Tx:  [x] No  [] Yes  Action:  Comments:  Patient arrives noting a decrease in pain in the L shoulder. Pain is more in the back of the shoulder and L UT. Objective:  Modalities: Extended wear Ionto patch, 1 ml, 4 hr wear, R shoulder, Review medication list 2/6  Precautions: IPad interpretor for ASL.   Per sister, on the autism spectrum  Exercises:  Exercise Reps/ Time Weight/ Level Comments   UBE 2/2     Pulleys 2/2  Flex/abd         Table slides   Flexion   Wall slides with ball 20x ea  Flexion/ abd   Ball on ball 20x ea 1.1#    Scapular depression  Blueberry HEP         Prone on TG   Added weight 9.2   I, T's 10x 1 lb Ext 10x 1 lb    Rows 10x 1 lb          Thera bands      Ext 20x Blue    Rows 20x Blue    Horizontal abduction 20x Blue 2nd hole from top   IR 20x  blue Added 9.2   ER 20x blue Added 9.2               Seated      Retro shoulder rolls 10 x      Corner pec stretch 3 x 15 sec    Upper trap stretch 3 x 15 sec  Arm behind back, gently tilt head to side   Levator stretch 3x20\"     Cervical flexion 5x      Cervical side bending 5x ea     Cervical rotation 5x ea  Discontinued R rotation due to pain         Standing      Cane IR 20x     Cane Ext 20x     Shoulder flexion 20x 1 lb  DB    Shoulder ABD 5x           Supine cane exercises       Flexion 20x     Chest press 20x     HAB 10x           SL ER 10x  Added 8.31   SL abd 10x  Added 8.31         PROM 5 min  Flexion/abd   Manual therapy    Trigger point release to bilateral upper traps and cervical spine, trigger point release to periscapular areas bilaterally. Other:  Manual:  Layer 1 & 2 MFR R mid/upper trap/ rhomboids, proximal triceps along with Hyper Volt soft head level 1 x8 min    Treatment Charges: Mins Units   []  Modalities     [x]  Ther Exercise 52 3   [x]  Manual Therapy 8 1   []  Ther Activities     []  Aquatics     []  Vasocompression     []  Other Ionto 2/6     Total Treatment time 60 4       Assessment: [x] Progressing toward goals. Increased resistance with thera bands with good tolerance. ER/IR added with fair tolerance. Improved ROM noted with shoulder flexion/ IR and extension. Weights added with prone exercises to increase scapular strength. Patient with good recall of exercises. Held ionto per patient request.  Ended with trial of manual tissue manipulation to mid/upper trapezius and proximal triceps. [] No change.        [] Other:  [x] Patient would continue to benefit from skilled physical therapy services in order to: improved posture, improved ROM, improved strength, decreased tingling/numbness in arm, decreased pain in right shoulder and down right arm. Problems:    [x]? ? Pain:  [x]? ? ROM:  [x]? ? Strength:  [x]? ? Function:  []? Other:       STG: (to be met in 6 treatments)  1. ? Pain: Right shoulder pain improve to 5/10 with active movements  2. ? ROM: Seated right shoulder abduction improve to 125 degrees without pain. 3. Supine right shoulder abduction improve to 150 degrees without pain. 4. ? Strength: Right shoulder strength improve to 4/5 without pain  5. ? Function: Patient to report decreased pain in right shoulder with laying supine. 6. Patient to be independent with home exercise program as demonstrated by performance with correct form without cues.     LTG: (to be met in 12 treatments)  1. Right shoulder pain improve to 2/10 at max with heavy lifting  2. Patient able to complete all ADLs and IADLs without significant pain  3. Patient to sit with more upright posture. 4. UEFI score improve to lacking 20% or less. 5. Patient to report resolution of tingling in to right arm. Patient goals: \"Stop shoulder from hurting\"     Rehab Potential:  [x]? Good  []? Fair  []? Poor    Suggested Professional Referral:  [x]? No  []? Yes:  Barriers to Goal Achievement:  [x]? No  []? Yes:  Domestic Concerns:  [x]? No  []? Yes:    Pt. Education:  [x] Yes  [] No  [x] Reviewed Prior HEP/Ed  Method of Education: [x] Verbal  [x] Demo  [] Written  Comprehension of Education:  [x] Verbalizes understanding. [x] Demonstrates understanding. [x] Needs review. [x] Demonstrates/verbalizes HEP/Ed previously given. Plan: [x] Continue current frequency toward long and short term goals. [x] Specific Instructions for subsequent treatments: Progress to weighted exercises.   Supine over half roll to stretch chest.  STG      Time In: 10:00 am           Time Out: 11:00 am    Electronically signed by:  Jill Vee PTA

## 2021-09-07 ENCOUNTER — HOSPITAL ENCOUNTER (OUTPATIENT)
Dept: PHYSICAL THERAPY | Age: 56
Setting detail: THERAPIES SERIES
Discharge: HOME | End: 2021-09-07
Payer: MEDICARE

## 2021-09-07 PROCEDURE — 97110 THERAPEUTIC EXERCISES: CPT

## 2021-09-07 NOTE — FLOWSHEET NOTE
[x] [facility]) [facility] &  Therapy  [address]  [phone]  F: [phone] [] [address]  [place] [address]   [address]  P: [phone]  F: [phone] [] [place]  [address]  P: [phone]  F: [phone] [] [address]  P: [phone]  F: [phone] [] [address]  [facility]   [address]   [place]: [phone]  F: [phone]      Physical Therapy Daily Treatment Note    Date:  2021  Patient Name:  Gwen Raza    :  1965  MRN: [id number]  Physician: CAREY Montelongo                                     Insurance: Nadeem Josi for Life  Medical Diagnosis: chronic right shoulder pain                     Rehab Codes: M 25.511, M 25.611, M 62.81, R 20.2  Onset Date: 2021                        Next 's appt: 21   Visit# / total visits: ; Progress note for Medicare patient due at visit 12   Cancels/No Shows: 0/0    Subjective: :  Milagros Huang #[id number]  Pain:  [x] Yes  [] No  Location: Right posterior shoulder     Pain Rating: (0-10 scale) 2/10  Pain altered Tx:  [x] No  [] Yes  Action:  Comments:  Reports pain patch helps while it is on, but not longer. Has been doing HEP. Very pinpoint specific on posterior shoulder where pain is located. Objective:  Modalities: Extended wear Ionto patch, 1 ml, 4 hr wear, R shoulder, Review medication list  -- held 21 as patient is not convinced it is helping at all. Precautions: IPad interpretor for ASL.   Per sister, on the autism spectrum  Exercises:  Exercise Reps/ Time Weight/ Level Comments   UBE 2 min/2 min  Forward/retro   Pulleys 2 min/2 min  Flex/abd         Table slides   Flexion   Wall slides with ball Flexion/ abd   Ball on ball  1.1#    Corner pect stretch 3 x 15-20 seconds          Prone on TG   Added weight 9.2   I, T's 10x ea 1 lb    Ext 10x 1 lb    Rows 10x 1 lb          Thera bands      Ext 20x Blue    Rows 20x Blue    Horizontal abduction 20x Blue 2nd hole from top   IR 20x  blue Added 9.2   ER 20x blue Added 9.2   Shoulder depression 20 x Blue          Seated      Retro shoulder rolls 10 x      Upper trap stretch 3 x 15 sec  Arm behind back, gently tilt head to side   Levator stretch 3x ea 15 sec    Cervical flexion/ext stretch 5x ea 5 sec    Cervical side bending stretch 5x ea 5 sec    Cervical rotation stretch 5x ea 5 sec          Standing      Cane IR 15 x 3 lb    Cane Ext 15 x 3 lb    Shoulder flexion 20x 1 lb R  2 lb L    Shoulder ABD 15 x 1 lb R  2 lb L          Supine cane exercises       Flexion 20x 3 lb    Chest press 20x 3 lb    HAB 20 x 3 lb          SL ER 15 x 2  Added 8.31   SL abd 10x  Added 8.31         PROM   Flexion/abd   Manual therapy    Trigger point release to bilateral upper traps and cervical spine, trigger point release to periscapular areas bilaterally. Other:  Manual:     Treatment Charges: Mins Units   []  Modalities     [x]  Ther Exercise 60 4   []  Manual Therapy     []  Ther Activities     []  Aquatics     []  Vasocompression     []  Other Ionto 2/6     Total Treatment time 60        Assessment: [x] Progressing toward goals. Able to increase reps/weight/resistance with good tolerance this date. Some soreness but able to complete all above. Goal assessment per below. Held manual due to goal assessment and good active ROM. [] No change. [x] Other:  Issues connecting with ipad this date - started late due to this. [x] Patient would continue to benefit from skilled physical therapy services in order to: improved posture, improved ROM, improved strength, decreased tingling/numbness in arm, decreased pain in right shoulder and down right arm.       STG: (to be

## 2021-09-08 NOTE — PROGRESS NOTES
[x] [facility]) [facility] &  Therapy  [address]  [phone]  F: [phone] [] [address]  [facility]   [address]  P: [phone]  F: [phone] [] [address] &  Therapy  [address]  P: [phone]  F: [phone] [] [address]  P: [phone]  F: [phone] [] [address]  [facility]   [address]   [place]: [phone]  F: [phone]      Physical Therapy Progress Note    Date: 2021      Patient: Regina Ronquillo  : 1965  MRN: [id number]: EHJNVLU Nicolas Cornelius, APRN-ELINOR                                     [facility], [address] [place] [address] right shoulder pain                     Rehab Codes: M 25.511, M 25.611, M 62.81, R 20.2  Onset Date: 2021                        Next 's appt: 21   Visit# / total visits: ; Progress note for Medicare patient due at visit 12            Cancels/No Shows: 0/0  Date range of services: 21 to 21    Subjective: :  Esvin Henderson #[id number]  Pain:  [x]? Yes  []? No               Location: Right posterior shoulder                    Pain Rating: (0-10 scale) 2/10  Pain altered Tx:  [x]? No  []? Yes  Action:  Comments:  Reports pain patch helps while it is on, but not longer. Has been doing HEP. Very pinpoint specific on posterior shoulder where pain is located. Objective:  Test Measurements: Seated abduction 127 degrees; supine abduction 158 degrees. Strength 4/5 IR and flexion. Strength 4-5/ ER and abduction with pain. Function:   Pain improved, 4/10 at max. Sleep is better, though will occasionally wake up if she sleeps on her right side (not consistently any longer).     Assessment:  STG: (to be met in 6 treatments)  1. ? Pain: Right shoulder pain improve to 5/10 with active movements -- Reports little pain, 4/10,when doing chores at home. 2. ? ROM: Seated right shoulder abduction improve to 125 degrees without pain. -- Met, 127 degrees  3. Supine right shoulder abduction improve to 150 degrees without pain. -- Met, 158 degrees  4. ? Strength: Right shoulder strength improve to 4/5 without pain -- Met, 4/5 strength without pain for flexion, IR.  4-/5 for abduction, ER with pain. 5. ? Function: Patient to report decreased pain in right shoulder with laying supine. -- She can sleep on her right side- though it does wake her at times. 6. Patient to be independent with home exercise program as demonstrated by performance with correct form without cues. -- Met     LTG: (to be met in 12 treatments)  1. Right shoulder pain improve to 2/10 at max with heavy lifting  2. Patient able to complete all ADLs and IADLs without significant pain  3. Patient to sit with more upright posture. 4. UEFI score improve to lacking 20% or less. 5. Patient to report resolution of tingling in to right arm.                 Patient goals: \"Stop shoulder from hurting\"    Treatment Plan:  [x] Therapeutic Exercise   02212  [x] Iontophoresis: 4 mg/mL Dexamethasone Sodium Phosphate  mAmin  46385   [] Therapeutic Activity  57897 [] Vasopneumatic cold with compression  16725    [] Gait Training   07426 [] Ultrasound   80805   [] Neuromuscular Re-education  82909 [] Electrical Stimulation Unattended  25346   [x] Manual Therapy  14695 [] Electrical Stimulation Attended  02638   [x] Instruction in HEP  [] Lumbar/Cervical Traction  63098   [] Aquatic Therapy   52417 [] Cold/hotpack    [] Massage   76814      [] Dry Needling, 1 or 2 muscles  55098   [] Biofeedback, first 15 minutes   95818  [] Biofeedback, additional 15 minutes   93396 [] Dry Needling, 3 or more muscles  94718       Patient Status:     [x] Continue per initial plan of care. Patient has shown improvements or met goals as noted above. Patient remains with very specific pain on posterior shoulder. Range, strength, and function have improved. [] Additional visits necessary. [] Other:      Electronically signed by Susan Jolly PT on 9/7/2021 at 9:50 PM      If you have any questions or concerns, please don't hesitate to call. Thank you for your referral.    Physician Signature:________________________________Date:__________________  By signing above or cosigning this note, I have reviewed this plan of care and certify a need for medically necessary rehabilitation services.      *PLEASE SIGN ABOVE AND FAX BACK ALL PAGES*

## 2021-09-09 ENCOUNTER — HOSPITAL ENCOUNTER (OUTPATIENT)
Dept: PHYSICAL THERAPY | Age: 56
Setting detail: THERAPIES SERIES
Discharge: HOME | End: 2021-09-09
Payer: MEDICARE

## 2021-09-09 PROCEDURE — 97110 THERAPEUTIC EXERCISES: CPT

## 2021-09-09 NOTE — FLOWSHEET NOTE
[x] [facility]) [facility] &  Therapy  [address]  [phone]  F: [phone] [] [address]  [facility]   [address]  P: [phone]  F: [phone] [] [address]  [address]  P: [phone]  F: [phone] [] [address]  P: [phone]  F: [phone] [] [address]  [facility]   [address]   [place]: [phone]  F: [phone]      Physical Therapy Daily Treatment Note    Date:  2021  Patient Name:  Regina Ronquillo    :  1965  MRN: [id number]  Physician: CAREY Lewis                                     Insurance: [employer] for [employer]  Medical Diagnosis: chronic right shoulder pain                     Rehab Codes: M 25.511, M 25.611, M 62.81, R 20.2  Onset Date: 2021                        Next 's appt: 21   Visit# / total visits: ; Progress note for Medicare patient due at visit 12   Cancels/No Shows: 0/0    Subjective: :  Jad Hackett #[id number], Charles Vo #[id number]  Pain:  [x] Yes  [] No  Location: Right posterior shoulder     Pain Rating: (0-10 scale) 4/10  Pain altered Tx:  [x] No  [] Yes  Action:  Comments: Pt arrives with increased pain in R shoulder vs last session. Objective:  Modalities: Extended wear Ionto patch, 1 ml, 4 hr wear, R shoulder, Review medication list  -- held 21 as patient is not convinced it is helping at all. Precautions: IPad interpretor for ASL.   Per sister, on the autism spectrum  Exercises:  Exercise Reps/ Time Weight/ Level Comments   UBE 2 min/2 min L 2.5 Forward/retro   Pulleys 2 min/2 min  Flex/abd         Table slides   Flexion   Wall slides with ball   Flexion/ abd   Ball on ball  1.1#    Corner pect stretch 3 x 15-20 seconds          Prone on TG   Added weight 9.2   I, T, Y's 10x ea 1 lb Added 'Y' 9/9   Ext 10x 1 lb    Rows 10x 1 lb          Thera bands      Ext 20x Blue    Rows 20x Blue    Horizontal abduction 20x Blue 2nd hole from top   IR 20x  blue Added 9.2   ER 20x blue Added 9.2   Shoulder depression 20x Blue    Shoulder flexion  20x Blue     Retro shoulder rolls  20x Blue  Added 9/9   Lat pull down  20x Blue  Added 9/9         Seated      Retro shoulder rolls 10 x      Upper trap stretch 3 x 15 sec  Arm behind back, gently tilt head to side   Levator stretch 3x ea 15 sec    Cervical flexion/ext stretch 5x ea 5 sec    Cervical side bending stretch 5x ea 5 sec    Cervical rotation stretch 5x ea 5 sec          Standing      Cane IR 15 x 3 lb    Cane Ext 15 x 3 lb    Shoulder flexion 20x 1 lb R  2 lb L    Shoulder ABD 20 x 1 lb R  2 lb L          Supine cane exercises       Flexion 20x 3 lb    Chest press 20x 3 lb    HAB 20 x 3 lb    Serratus punch 10x 1 lb DB Added 9/9         SL ER 15 x 2 lb Added 8.31   SL abd 15x 1 lb Added 8.31; added weight 9/9   SL HAB 15x 1 lb Added 9/9               PROM   Flexion/abd   Manual therapy    Trigger point release to bilateral upper traps and cervical spine, trigger point release to periscapular areas bilaterally. Other:  Manual:     Treatment Charges: Mins Units   []  Modalities     [x]  Ther Exercise 51 3   []  Manual Therapy     []  Ther Activities     []  Aquatics     []  Vasocompression     []  Other Ionto 2/6     Total Treatment time 51 3       Assessment: [x] Progressing toward goals. Progressed t band exercises with demo's to ensure proper positioning and increased reps for charted exercises as able. Addition of supine serratus punches and prone scaption for further shoulder strengthening. Increased reps and added HAB to side lying exercises completing with 1-2# dumbbells this date. Will continue to progress strength as tolerated in upcoming sessions. [] No change. [] Other:   [x] Patient would continue to benefit from skilled physical therapy services in order to: improved posture, improved ROM, improved strength, decreased tingling/numbness in arm, decreased pain in right shoulder and down right arm. STG: (to be met in 6 treatments)  1. ? Pain: Right shoulder pain improve to 5/10 with active movements -- Reports little pain, 4/10,when doing chores at home. 2. ? ROM: Seated right shoulder abduction improve to 125 degrees without pain. -- Met, 127 degrees  3. Supine right shoulder abduction improve to 150 degrees without pain. -- Met, 158 degrees  4. ? Strength: Right shoulder strength improve to 4/5 without pain -- Met, 4/5 strength without pain for flexion, IR.  4-/5 for abduction, ER with pain. 5. ? Function: Patient to report decreased pain in right shoulder with laying supine. -- She can sleep on her right side- though it does wake her at times. 6. Patient to be independent with home exercise program as demonstrated by performance with correct form without cues. -- Met     LTG: (to be met in 12 treatments)  1. Right shoulder pain improve to 2/10 at max with heavy lifting  2. Patient able to complete all ADLs and IADLs without significant pain  3. Patient to sit with more upright posture. 4. UEFI score improve to lacking 20% or less. 5. Patient to report resolution of tingling in to right arm. Patient goals: \"Stop shoulder from hurting\"      Pt. Education:  [x] Yes  [] No  [] Reviewed Prior HEP/Ed  Method of Education: [x] Verbal  [x] Demo  [] Written  Resistance band: extension, rows, IR, ER, horizontal abduction; prone extension, rows, horizontal abduction, flexion. Comprehension of Education:  [] Verbalizes understanding. [] Demonstrates understanding. [] Needs review. [x] Demonstrates/verbalizes HEP/Ed previously given. Plan: [x] Continue current frequency toward long and short term goals.     [x] Specific Instructions for subsequent treatments: Progress to weighted exercises.   Supine over half roll to stretch chest.        Time In: 10:03 am    Time Out: 10:58 am    Electronically signed by:  Reese Patiño PTA

## 2021-09-13 ENCOUNTER — OFFICE VISIT (OUTPATIENT)
Dept: FAMILY MEDICINE CLINIC | Age: 56
End: 2021-09-13
Payer: MEDICARE

## 2021-09-13 VITALS
WEIGHT: 171 LBS | HEART RATE: 115 BPM | DIASTOLIC BLOOD PRESSURE: 74 MMHG | BODY MASS INDEX: 29.35 KG/M2 | OXYGEN SATURATION: 98 % | SYSTOLIC BLOOD PRESSURE: 120 MMHG

## 2021-09-13 DIAGNOSIS — G89.29 CHRONIC INTRACTABLE HEADACHE, UNSPECIFIED HEADACHE TYPE: ICD-10-CM

## 2021-09-13 DIAGNOSIS — R51.9 CHRONIC INTRACTABLE HEADACHE, UNSPECIFIED HEADACHE TYPE: ICD-10-CM

## 2021-09-13 DIAGNOSIS — F32.A MILD DEPRESSION: ICD-10-CM

## 2021-09-13 DIAGNOSIS — H91.93 BILATERAL DEAFNESS: Primary | ICD-10-CM

## 2021-09-13 PROCEDURE — 99213 OFFICE O/P EST LOW 20 MIN: CPT | Performed by: NURSE PRACTITIONER

## 2021-09-13 PROCEDURE — G8427 DOCREV CUR MEDS BY ELIG CLIN: HCPCS | Performed by: NURSE PRACTITIONER

## 2021-09-13 PROCEDURE — 3017F COLORECTAL CA SCREEN DOC REV: CPT | Performed by: NURSE PRACTITIONER

## 2021-09-13 PROCEDURE — G8417 CALC BMI ABV UP PARAM F/U: HCPCS | Performed by: NURSE PRACTITIONER

## 2021-09-13 PROCEDURE — 4004F PT TOBACCO SCREEN RCVD TLK: CPT | Performed by: NURSE PRACTITIONER

## 2021-09-13 RX ORDER — OMEPRAZOLE 20 MG/1
20 CAPSULE, DELAYED RELEASE ORAL DAILY
Qty: 30 CAPSULE | Refills: 5 | Status: SHIPPED | OUTPATIENT
Start: 2021-09-13 | End: 2022-01-31 | Stop reason: SDUPTHER

## 2021-09-13 RX ORDER — AMITRIPTYLINE HYDROCHLORIDE 50 MG/1
TABLET, FILM COATED ORAL
Qty: 30 TABLET | Refills: 5 | Status: SHIPPED | OUTPATIENT
Start: 2021-09-13 | End: 2022-01-31 | Stop reason: SDUPTHER

## 2021-09-13 SDOH — ECONOMIC STABILITY: FOOD INSECURITY: WITHIN THE PAST 12 MONTHS, THE FOOD YOU BOUGHT JUST DIDN'T LAST AND YOU DIDN'T HAVE MONEY TO GET MORE.: NEVER TRUE

## 2021-09-13 SDOH — ECONOMIC STABILITY: FOOD INSECURITY: WITHIN THE PAST 12 MONTHS, YOU WORRIED THAT YOUR FOOD WOULD RUN OUT BEFORE YOU GOT MONEY TO BUY MORE.: NEVER TRUE

## 2021-09-13 ASSESSMENT — ENCOUNTER SYMPTOMS
SHORTNESS OF BREATH: 0
COUGH: 0

## 2021-09-13 ASSESSMENT — SOCIAL DETERMINANTS OF HEALTH (SDOH): HOW HARD IS IT FOR YOU TO PAY FOR THE VERY BASICS LIKE FOOD, HOUSING, MEDICAL CARE, AND HEATING?: NOT HARD AT ALL

## 2021-09-13 NOTE — PROGRESS NOTES
Patient is present for 4 month f/u    Patient states she is still having right shoulder pain  States she is doing PT and that seems to help

## 2021-09-13 NOTE — PROGRESS NOTES
[address] [place]  [phone]    Lalo Goldsmith is a 64 y.o. female who presents today for her  medical conditions/complaints as noted below. Lalo Goldsmith is c/o of Follow-up  . HPI:     HPI   Karlos Hammonds is here for med refills. Communication done through interpretor. Interpretation even with itnerpretors is difficult because fam speaks some sort of slang ASL per the interpretors. Two interpretors are present to verify. Karlos Hammonds expresses some dissatisfaction with her living situation, after further evaluation she states she is fine and safe. Nursing note reviewed and discussed with patient. Patient's medications, allergies, past medical, surgical, social and family histories were reviewed and updated asappropriate. Current Outpatient Medications   Medication Sig Dispense Refill    omeprazole (PRILOSEC) 20 MG delayed release capsule Take 1 capsule by mouth Daily 30 capsule 5    sertraline (ZOLOFT) 50 MG tablet Take 1 tablet by mouth daily 30 tablet 5    amitriptyline (ELAVIL) 50 MG tablet TAKE ONE TABLET BY MOUTH ONCE NIGHTLY 30 tablet 5     No current facility-administered medications for this visit. Past Medical History:   Diagnosis Date    Asthma     Deaf     Depression     GERD (gastroesophageal reflux disease)       Past Surgical History:   Procedure Laterality Date    CARPAL TUNNEL RELEASE Bilateral     COLONOSCOPY      COLONOSCOPY  09/21/2017     The mucosal exam was normal. The left colon was tortuous.  A 4 mm polyp was removed from transverse colon by cold biopsy forceps    HYSTERECTOMY      WI COLSC FLX W/REMOVAL LESION BY HOT BX FORCEPS N/A 9/21/2017    COLONOSCOPY POLYPECTOMY COLD BIOPSY performed by Thor Juarez MD at [address] [facility] EGD TRANSORAL BIOPSY SINGLE/MULTIPLE N/A 9/21/2017    EGD BIOPSY performed by Thor Juarez MD at [facility]  09/21/2017    mild esophagitis     Family History   Problem Relation Age of Onset    Cancer Mother         throat    Cancer Father         colon- advanced age   [employer] Cancer Sister         hodgkins     Social History     Tobacco Use    Smoking status: Never Smoker   Substance Use Topics    Alcohol use: No      No Known Allergies    Subjective:      Review of Systems   Constitutional: Negative for chills and fever. Respiratory: Negative for cough and shortness of breath. Cardiovascular: Negative for chest pain, palpitations and leg swelling. Other pertinent ROS in HPI  Objective:     /74 (Site: Left Upper Arm, Position: Sitting, Cuff Size: Large Adult)   Pulse 115   Wt 171 lb (77.6 kg)   SpO2 98%   BMI 29.35 kg/m²    Physical Exam  Constitutional:       Appearance: She is well-developed. HENT:      Right Ear: External ear normal.      Left Ear: External ear normal.      Nose: Nose normal.   Cardiovascular:      Rate and Rhythm: Normal rate and regular rhythm. Heart sounds: Normal heart sounds, S1 normal and S2 normal.   Pulmonary:      Effort: Pulmonary effort is normal. No respiratory distress. Breath sounds: Normal breath sounds. Musculoskeletal:         General: No deformity. Normal range of motion. Cervical back: Full passive range of motion without pain and normal range of motion. Skin:     General: Skin is warm and dry. Neurological:      Mental Status: She is alert and oriented to person, place, and time. Assessment/PLAN   1. Chronic intractable headache, unspecified headache type    - amitriptyline (ELAVIL) 50 MG tablet; TAKE ONE TABLET BY MOUTH ONCE NIGHTLY  Dispense: 30 tablet; Refill: 5    2. Bilateral deafness      3. Mild depression (HCC)    - sertraline (ZOLOFT) 50 MG tablet; Take 1 tablet by mouth daily  Dispense: 30 tablet; Refill: 5      RTO if symptoms worsen or fail to improve  Pt agreeable with plan      Patient given educational materials - see patientinstructions.

## 2021-09-14 ENCOUNTER — HOSPITAL ENCOUNTER (OUTPATIENT)
Dept: PHYSICAL THERAPY | Age: 56
Setting detail: THERAPIES SERIES
Discharge: HOME | End: 2021-09-14
Payer: MEDICARE

## 2021-09-14 PROCEDURE — 97110 THERAPEUTIC EXERCISES: CPT

## 2021-09-14 NOTE — FLOWSHEET NOTE
[x] [facility]) [facility] &  Therapy  [address]  [phone]  F: [phone] [] [address]  [facility]   [address]  P: [phone]  F: [phone] [] [place]  [address]  P: [phone]  F: [phone] [] [address]  P: [phone]  F: [phone] [] [address]  [facility]   [address]   [place]: [phone]  F: [phone]      Physical Therapy Daily Treatment Note    Date:  2021  Patient Name:  Jose Alfredo Vogt    :  1965  MRN: [id number]  Physician: CAREY Castaneda                                     Insurance: [employer] for Life  Medical Diagnosis: chronic right shoulder pain                     Rehab Codes: M 25.511, M 25.611, M 62.81, R 20.2  Onset Date: 2021                        Next 's appt: 21   Visit# / total visits: ; Progress note for Medicare patient due at visit 12   Cancels/No Shows: 0/0    Subjective: :  Marco Min #[id number]  Pain:  [x] Yes  [] No  Location:  Right posterior shoulder     Pain Rating: (0-10 scale) 2/10  Pain altered Tx:  [x] No  [] Yes  Action:  Comments: Patient arrives stating a decrease in the R shoulder pain. Feels she is getting stronger. Less pain. Soreness and some pain in the back of the arm (pointing to the proximal tricep),     Objective:  Modalities: Extended wear Ionto patch, 1 ml, 4 hr wear, R shoulder, Review medication list  -- held 21 as patient is not convinced it is helping at all. Precautions: IPad interpretor for ASL.   Per sister, on the autism spectrum  Exercises:  Exercise Reps/ Time Weight/ Level Comments   UBE 2 min/2 min L 4 Forward/retro  Inc resistance 9.14   Pulleys   Flex/abd Table slides   Flexion   Ball slides with ball 20x  Flexion/ abd   Ball on ball 20x 2.2#    Corner pect stretch 3 x 15-20 seconds          Prone on TG   Held 9.14   I, T, Y's 10x ea 1 lb Added 'Y' 9/9   Ext 10x 1 lb    Rows 10x 1 lb          Thera bands      Ext 20x purple    Rows 20x purple    Horizontal abduction 20x purple 2nd hole from top   IR 20x  purple Added 9.2   ER 20x purple Added 9.2   Shoulder depression 20x     Shoulder flexion  20x     Retro shoulder rolls  20x purple Added 9/9   Lat pull down  20x purple Added 9/9         Seated      Retro shoulder rolls 10 x      Upper trap stretch 3 x 15 sec  Arm behind back, gently tilt head to side   Levator stretch 3x ea 15 sec    Cervical flexion/ext stretch 5x ea 5 sec    Cervical side bending stretch 5x ea 5 sec    Cervical rotation stretch 5x ea 5 sec          Standing      Cane IR 15 x 3 lb    Cane Ext 15 x 3 lb    Shoulder flexion 20x 3 lbs    Shoulder ABD 20 x 3 lbs    Shoulder flexion DB 10x 1 lb Added 9.14   Shoulder abd DB 10x 1 lb Added 9.14               Supine cane exercises    Held 9.14   Flexion  3 lb    Chest press  3 lb    HAB  3 lb    Serratus punch  1 lb DB       Held 9.14   SL ER  2 lb    SL abd  1 lb    SL HAB  1 lb                PROM   Flexion/abd   Manual therapy    Trigger point release to bilateral upper traps and cervical spine, trigger point release to periscapular areas bilaterally. Other:  Manual:     Treatment Charges: Mins Units   []  Modalities     [x]  Ther Exercise 55 4   []  Manual Therapy     []  Ther Activities     []  Aquatics     []  Vasocompression     []  Other Ionto 2/6     Total Treatment time 55 4       Assessment: [x] Progressing toward goals. Good progression with thera band resistance. Min cueing for shoulder depression to avoid elevation as muscles fatigue with improved carry over. Only requires a quick touch to remind patient. Advanced to ball on wall with improved ROM.   Added shoulder flexion/abduction with dumbbells to increase UE strength with no increase in pain. [] No change. [] Other:   [x] Patient would continue to benefit from skilled physical therapy services in order to: improved posture, improved ROM, improved strength, decreased tingling/numbness in arm, decreased pain in right shoulder and down right arm. STG: (to be met in 6 treatments)  1. ? Pain: Right shoulder pain improve to 5/10 with active movements -- Reports little pain, 4/10,when doing chores at home. 2. ? ROM: Seated right shoulder abduction improve to 125 degrees without pain. -- Met, 127 degrees  3. Supine right shoulder abduction improve to 150 degrees without pain. -- Met, 158 degrees  4. ? Strength: Right shoulder strength improve to 4/5 without pain -- Met, 4/5 strength without pain for flexion, IR.  4-/5 for abduction, ER with pain. 5. ? Function: Patient to report decreased pain in right shoulder with laying supine. -- She can sleep on her right side- though it does wake her at times. 6. Patient to be independent with home exercise program as demonstrated by performance with correct form without cues. -- Met     LTG: (to be met in 12 treatments)  1. Right shoulder pain improve to 2/10 at max with heavy lifting  2. Patient able to complete all ADLs and IADLs without significant pain  3. Patient to sit with more upright posture. 4. UEFI score improve to lacking 20% or less. 5. Patient to report resolution of tingling in to right arm. Patient goals: \"Stop shoulder from hurting\"      Pt. Education:  [x] Yes  [] No  [x] Reviewed Prior HEP/Ed  Method of Education: [x] Verbal  [x] Demo  [] Written  Resistance band: extension, rows, IR, ER, horizontal abduction; prone extension, rows, horizontal abduction, flexion. Comprehension of Education:  [x] Verbalizes understanding. [x] Demonstrates understanding. [] Needs review. [x] Demonstrates/verbalizes HEP/Ed previously given.      Access Code: [email or web address]: 09/14/2021Prepared by: Stephanie Jamaal   Given to patient prior on 9.2.21  TB Shoulder extension - 1 x daily - 7 x weekly - 10 reps - 3 sets   TB Rows - 1 x daily - 7 x weekly - 10 reps - 3 sets   TB triceps - 1 x daily - 7 x weekly - 10 reps - 3 sets   TB ER - 1 x daily - 7 x weekly - 3 sets - 10 reps   TB susana ER - 1 x daily - 7 x weekly - 3 sets - 10 reps   TB IR - 1 x daily - 7 x weekly - 3 sets - 10 reps   Standing Shoulder Horizontal Abduction with Anchored Resistance - 1 x daily - 7 x weekly - 3 sets - 10 reps   Standing High Row with Resistance - 1 x daily - 7 x weekly - 3 sets - 10 reps      Plan: [x] Continue current frequency toward long and short term goals.     [x] Specific Instructions for subsequent treatments: Progress weighted exercises, update HEP       Time In: 10:00 am    Time Out: 10:55 am    Electronically signed by:  Alejandro Mccabe PTA

## 2021-09-16 ENCOUNTER — HOSPITAL ENCOUNTER (OUTPATIENT)
Dept: PHYSICAL THERAPY | Age: 56
Setting detail: THERAPIES SERIES
Discharge: HOME | End: 2021-09-16
Payer: MEDICARE

## 2021-09-16 PROCEDURE — 97110 THERAPEUTIC EXERCISES: CPT

## 2021-09-16 PROCEDURE — 97140 MANUAL THERAPY 1/> REGIONS: CPT

## 2021-09-16 NOTE — FLOWSHEET NOTE
[x] [facility]) [facility] &  Therapy  [address]  [phone]  F: [phone] [] [address]  [place] [address]   [address]  P: [phone]  F: [phone] [] [place]  [address]  P: [phone]  F: [phone] [] [address]  P: [phone]  F: [phone] [] [address]  [facility]   [address]   [place]: [phone]  F: [phone]      Physical Therapy Daily Treatment Note    Date:  2021  Patient Name:  Mary Hernandez    :  1965  MRN: [id number]  Physician: CAREY Diaz                                     Insurance: [employer] [facility]  Medical Diagnosis: chronic right shoulder pain                     Rehab Codes: M 25.511, M 25.611, M 62.81, R 20.2  Onset Date: 2021                        Next 's appt: 21   Visit# / total visits: 10/12; Progress note for Medicare patient due at visit 12   Cancels/No Shows: 0/0    Subjective: :  Beverley Ross #[id number]      Pain:  [x] Yes  [] No  Location:  Right posterior shoulder     Pain Rating: (0-10 scale) 2/10  Pain altered Tx:  [x] No  [] Yes  Action:  Comments: Patient arrives noting a 'pop' in the R side of her neck this morning. N/T into the R arm while sleeping on the side. Goes away after moving positions. Objective:  Modalities: Extended wear Ionto patch, 1 ml, 4 hr wear, R shoulder, Review medication list  -- held 21 as patient is not convinced it is helping at all. Precautions: IPad interpretor for ASL.   Per sister, on the autism spectrum  Exercises:  Exercise Reps/ Time Weight/ Level Comments   UBE 2 min/2 min L 4 Forward/retro  Inc resistance 9.14   Pulleys   Flex/abd         Table slides Flexion   Ball slides with ball 20x  Flexion/ abd   Ball on ball 20x 2.2#    Corner pect stretch 3 x 15-20 seconds          Prone on TG   Held 9.14   I, T, Y's 10x ea 1 lb Added 'Y' 9/9   Ext 10x 1 lb    Rows 10x 1 lb          Thera bands      Ext 20x purple    Rows 20x purple    Horizontal abduction 20x purple 2nd hole from top   IR 20x  purple Added 9.2   ER 20x purple Added 9.2   Shoulder depression 20x     Shoulder flexion  20x     Retro shoulder rolls  20x purple Added 9/9   Lat pull down  20x purple Added 9/9         Seated      Retro shoulder rolls 10 x      Upper trap stretch 3 x 15 sec  Arm behind back, gently tilt head to side   Levator stretch 3x ea 15 sec    Cervical flexion/ext stretch 5x ea 5 sec    Cervical side bending stretch 5x ea 5 sec    Cervical rotation stretch 5x ea 5 sec          Standing      Cane IR 15 x 3 lb    Cane Ext 15 x 3 lb    Shoulder flexion 20x 3 lbs    Shoulder ABD 20 x 3 lbs    Shoulder flexion DB 20x 1 lb    Shoulder abd DB 20x 1 lb                Supine cane exercises       Flexion  3 lb    Chest press  3 lb    HAB  3 lb    Serratus punch  1 lb DB          SL ER  2 lb    SL abd  1 lb    SL HAB  1 lb                PROM   Flexion/abd   Manual therapy    Trigger point release to bilateral upper traps and cervical spine, trigger point release to periscapular areas bilaterally. Other:  Manual: MFR Layer 2 to R UT, levator scapular, STM, paraspinals in chair x10 min    Treatment Charges: Mins Units   []  Modalities     [x]  Ther Exercise 48 3   [x]  Manual Therapy 10 1   []  Ther Activities     []  Aquatics     []  Vasocompression     []  Other Ionto 2/6     Total Treatment time 58 4       Assessment: [x] Progressing toward goals. Patient demonstrating improved scapular movement. Min cueing required throughout treatment with improved postural awareness. Increased reps with standing DB exercises with no increase in symptoms.   MFR applied to R UT, levator scapula to decrease tone.  Patient will 2 remaining visits. Decreased pain overall with increased ROM and function. [] No change. [] Other:   [x] Patient would continue to benefit from skilled physical therapy services in order to: improved posture, improved ROM, improved strength, decreased tingling/numbness in arm, decreased pain in right shoulder and down right arm. STG: (to be met in 6 treatments)  1. ? Pain: Right shoulder pain improve to 5/10 with active movements -- Reports little pain, 4/10,when doing chores at home. 2. ? ROM: Seated right shoulder abduction improve to 125 degrees without pain. -- Met, 127 degrees  3. Supine right shoulder abduction improve to 150 degrees without pain. -- Met, 158 degrees  4. ? Strength: Right shoulder strength improve to 4/5 without pain -- Met, 4/5 strength without pain for flexion, IR.  4-/5 for abduction, ER with pain. 5. ? Function: Patient to report decreased pain in right shoulder with laying supine. -- She can sleep on her right side- though it does wake her at times. 6. Patient to be independent with home exercise program as demonstrated by performance with correct form without cues. -- Met     LTG: (to be met in 12 treatments)  1. Right shoulder pain improve to 2/10 at max with heavy lifting  2. Patient able to complete all ADLs and IADLs without significant pain  3. Patient to sit with more upright posture. 4. UEFI score improve to lacking 20% or less. 5. Patient to report resolution of tingling in to right arm. Patient goals: \"Stop shoulder from hurting\"      Pt. Education:  [x] Yes  [] No  [x] Reviewed Prior HEP/Ed  Method of Education: [x] Verbal  [x] Demo  [] Written  Resistance band: extension, rows, IR, ER, horizontal abduction; prone extension, rows, horizontal abduction, flexion. Comprehension of Education:  [x] Verbalizes understanding. [x] Demonstrates understanding. [] Needs review.   [x] Demonstrates/verbalizes HEP/Ed previously given.     Access Code: [email or web address]: [email or web address]/Date: 09/14/2021Prepared by: Hina Wilkes   Given to patient prior on 9.2.21  TB Shoulder extension - 1 x daily - 7 x weekly - 10 reps - 3 sets   TB Rows - 1 x daily - 7 x weekly - 10 reps - 3 sets   TB triceps - 1 x daily - 7 x weekly - 10 reps - 3 sets   TB ER - 1 x daily - 7 x weekly - 3 sets - 10 reps   TB susana ER - 1 x daily - 7 x weekly - 3 sets - 10 reps   TB IR - 1 x daily - 7 x weekly - 3 sets - 10 reps   Standing Shoulder Horizontal Abduction with Anchored Resistance - 1 x daily - 7 x weekly - 3 sets - 10 reps   Standing High Row with Resistance - 1 x daily - 7 x weekly - 3 sets - 10 reps      Plan: [x] Continue current frequency toward long and short term goals.     [x] Specific Instructions for subsequent treatments: Progress weighted exercises, update HEP       Time In: 10:00 am    Time Out: 10:58 am    Electronically signed by:  Thor Langford PTA

## 2021-09-21 ENCOUNTER — HOSPITAL ENCOUNTER (OUTPATIENT)
Dept: PHYSICAL THERAPY | Age: 56
Setting detail: THERAPIES SERIES
Discharge: HOME | End: 2021-09-21
Payer: MEDICARE

## 2021-09-21 PROCEDURE — 97110 THERAPEUTIC EXERCISES: CPT

## 2021-09-21 PROCEDURE — 97140 MANUAL THERAPY 1/> REGIONS: CPT

## 2021-09-21 NOTE — FLOWSHEET NOTE
[x] [facility]) [facility] CENTER &  Therapy  [address]  [phone]  F: [phone] [] [address]  [place] [address]   [address]  P: [phone]  F: [phone] [] [place]  [address]  P: [phone]  F: [phone] [] [address]  P: [phone]  F: [phone] [] [address]  [facility]   [address]   [place]: [phone]  F: [phone]      Physical Therapy Daily Treatment Note    Date:  2021  Patient Name:  Kerin Mortimer    :  1965  MRN: [id number]  Physician: DEEPTI Guerrero-ELINOR                                     Insurance: [employer]  Medical Diagnosis: chronic right shoulder pain                     Rehab Codes: M 25.511, M 25.611, M 62.81, R 20.2  Onset Date: 2021                        Next 's appt: 21   Visit# / total visits: ; Progress note for Medicare patient due at visit 12   Cancels/No Shows: 0/0    Subjective: :  Breanna Hebert #[id number]      Pain:  [x] Yes  [] No  Location:  Right anterior shoulder     Pain Rating: (0-10 scale) 2/10  Pain altered Tx:  [x] No  [] Yes  Action:  Comments: Reports pain is located at the supraspinatus this date. Objective:  Modalities: Extended wear Ionto patch, 1 ml, 4 hr wear, R shoulder, Review medication list  -- held 21 as patient is not convinced it is helping at all. Precautions: IPad interpretor for ASL.   Per sister, on the autism spectrum  Exercises:  Exercise Reps/ Time Weight/ Level Comments   UBE  2 min/2 min L 4 Forward/retro  Inc resistance 9.14   Pulleys    Flex/abd         Table slides   Flexion   Ball slides with ball  20x  Flexion/ abd   Ball on ball  20x 2.2#    Corner pect stretch  15-20 seconds                Prone on TG      I, T, Y's 20x ea 2 lb for T's, 1lb for I and [employer] resistance 9.21   Ext 10x 2 lb    Rows 10x 2 lb          Thera bands      Ext   20x purple    Rows  20x gray    Horizontal abduction  20x purple 2nd hole from top   IR  20x  purple    ER  20x purple    Shoulder depression  20x     Shoulder flexion  20x     Retro shoulder rolls  20x purple    Lat pull down  20x purple          Seated      Retro shoulder rolls  10 x      Upper trap stretch 3 x 15 sec  Arm behind back, gently tilt head to side   Levator stretch 3x ea 15 sec    Cervical flexion/ext stretch x 5x ea 5 sec    Cervical side bending stretch x 5x ea 5 sec    Cervical rotation stretch x 5x ea 5 sec          Standing      Cane IR 15 x 3 lb    Cane Ext  15 x 3 lb    Shoulder flexion DB  20x 2 lb    Shoulder abd DB  20x 2 lb    Rebounder  20x 2.2 lb Added 9.21         Supine cane exercises       Flexion  3 lb    Chest press  3 lb    HAB  3 lb    Serratus punch  1 lb           SL ER  2 lb    SL abd  1 lb    SL HAB  1 lb                PROM   Flexion/abd   Manual therapy    Trigger point release to bilateral upper traps and cervical spine, trigger point release to periscapular areas bilaterally. Other:  Manual: MFR Layer 2 to R UT, levator scapular, STM, paraspinals in chair x10 min    Treatment Charges: Mins Units   []  Modalities     [x]  Ther Exercise 48 3   [x]  Manual Therapy 8 1   []  Ther Activities     []  Aquatics     []  Vasocompression     []  Other Ionto 2/6     Total Treatment time 56 mins 4       Assessment: [x] Progressing toward goals. R shoulder fatigued following the ball on wall exercise. Added rebounder exercise to improve function at the R shoulder. Required tactile cues to lower R shoulder throughout the standing theraband exercises. Progressed to the gray band for rows to increase strength. Patient demonstrates adequate recall of exercises and ability to self correct form.  Increased weight to 2 lbs for the prone T, extension, and row exercises. Gave patient purple theraband to complete HEP. [] No change. [] Other:   [x] Patient would continue to benefit from skilled physical therapy services in order to: improved posture, improved ROM, improved strength, decreased tingling/numbness in arm, decreased pain in right shoulder and down right arm. STG: (to be met in 6 treatments)  1. ? Pain: Right shoulder pain improve to 5/10 with active movements -- Reports little pain, 4/10,when doing chores at home. 2. ? ROM: Seated right shoulder abduction improve to 125 degrees without pain. -- Met, 127 degrees  3. Supine right shoulder abduction improve to 150 degrees without pain. -- Met, 158 degrees  4. ? Strength: Right shoulder strength improve to 4/5 without pain -- Met, 4/5 strength without pain for flexion, IR.  4-/5 for abduction, ER with pain. 5. ? Function: Patient to report decreased pain in right shoulder with laying supine. -- She can sleep on her right side- though it does wake her at times. 6. Patient to be independent with home exercise program as demonstrated by performance with correct form without cues. -- Met     LTG: (to be met in 12 treatments)  1. Right shoulder pain improve to 2/10 at max with heavy lifting  2. Patient able to complete all ADLs and IADLs without significant pain  3. Patient to sit with more upright posture. 4. UEFI score improve to lacking 20% or less. 5. Patient to report resolution of tingling in to right arm. Patient goals: \"Stop shoulder from hurting\"      Pt. Education:  [x] Yes  [] No  [x] Reviewed Prior HEP/Ed  Method of Education: [x] Verbal  [x] Demo  [] Written  Resistance band: extension, rows, IR, ER, horizontal abduction; prone extension, rows, horizontal abduction, flexion. Comprehension of Education:  [x] Verbalizes understanding. [x] Demonstrates understanding. [] Needs review.   [x] Demonstrates/verbalizes HEP/Ed previously given.     Access Code: [email or web address]: [employer]/Date: 09/14/2021Prepared by: Harry Figueroa   Given to patient prior on 9.2.21  TB Shoulder extension - 1 x daily - 7 x weekly - 10 reps - 3 sets   TB Rows - 1 x daily - 7 x weekly - 10 reps - 3 sets   TB triceps - 1 x daily - 7 x weekly - 10 reps - 3 sets   TB ER - 1 x daily - 7 x weekly - 3 sets - 10 reps   TB susana ER - 1 x daily - 7 x weekly - 3 sets - 10 reps   TB IR - 1 x daily - 7 x weekly - 3 sets - 10 reps   Standing Shoulder Horizontal Abduction with Anchored Resistance - 1 x daily - 7 x weekly - 3 sets - 10 reps   Standing High Row with Resistance - 1 x daily - 7 x weekly - 3 sets - 10 reps      Plan: [x] Continue current frequency toward long and short term goals. [x] Specific Instructions for subsequent treatments: Progress weighted exercises, update HEP       Time In: 9:57 am    Time Out: 10:57 am    Electronically signed by:  LUKE Aaron  Patient treated and note written by Alexus Pozo, Student Physical Therapist Assistant under supervision of Liseth Jimenez PTA.

## 2021-09-23 ENCOUNTER — HOSPITAL ENCOUNTER (OUTPATIENT)
Dept: PHYSICAL THERAPY | Age: 56
Setting detail: THERAPIES SERIES
Discharge: HOME | End: 2021-09-23
Payer: MEDICARE

## 2021-09-23 PROCEDURE — 97110 THERAPEUTIC EXERCISES: CPT

## 2021-09-23 NOTE — FLOWSHEET NOTE
[x] [facility]) - [facility] CENTER &  Therapy  [address]  [phone]  F: [phone] [] [address]  [place] [address] [address]   [address]  P: [phone]  F: [phone] [] [place]  [address]  P: [phone]  F: [phone] [] [address]  P: [phone]  F: [phone] [] [address]  [facility]   [address]   [place]: [phone]  F: [phone]      Physical Therapy Daily Treatment Note    Date:  2021  Patient Name:  Mirian Mendoza    :  1965  MRN: [id number]  Physician: Schuyler Sharp, APRN-CNP                                     Insurance: [employer]  Medical Diagnosis: chronic right shoulder pain                     Rehab Codes: M 25.511, M 25.611, M 62.81, R 20.2  Onset Date: 2021                        Next 's appt: 21   Visit# / total visits: ; Progress note for Medicare patient due at visit 12   Cancels/No Shows: 0/0    Subjective: :  Soco Mackenzie #[id number]      Pain:  [x] Yes  [] No  Location:  Right anterior shoulder     Pain Rating: (0-10 scale) 1/10  Pain altered Tx:  [x] No  [] Yes  Action:  Comments:  Pt arrives with mild pain in R shoulder this date. Objective:  Modalities: Extended wear Ionto patch, 1 ml, 4 hr wear, R shoulder, Review medication list  -- held 21 as patient is not convinced it is helping at all. Precautions: IPad interpretor for ASL.   Per sister, on the autism spectrum  Exercises:  Exercise Reps/ Time Weight/ Level Comments   UBE  2 min/2 min L 4 Forward/retro  Inc resistance 9.14   Pulleys    Flex/abd         Table slides   Flexion   Ball slides with ball  20x  Flexion/ abd   Ball on ball  20x 2.2#    Corner pect stretch  15-20 cues required to reduce R UT compensation with lat pull downs. Issued grey t band at end of session per pt request. Pt discharged from PT this date. [] No change. [] Other:   [x] Patient would continue to benefit from skilled physical therapy services in order to: improved posture, improved ROM, improved strength, decreased tingling/numbness in arm, decreased pain in right shoulder and down right arm. STG: (to be met in 6 treatments)  ? Pain: Right shoulder pain improve to 5/10 with active movements -- Reports little pain, 4/10,when doing chores at home. ? ROM: Seated right shoulder abduction improve to 125 degrees without pain. -- Met, 127 degrees  Supine right shoulder abduction improve to 150 degrees without pain. -- Met, 158 degrees  ? Strength: Right shoulder strength improve to 4/5 without pain -- Met, 4/5 strength without pain for flexion, IR.  4-/5 for abduction, ER with pain. ? Function: Patient to report decreased pain in right shoulder with laying supine. -- She can sleep on her right side- though it does wake her at times. Patient to be independent with home exercise program as demonstrated by performance with correct form without cues. -- Met    Assessed 9/23/21   LTG: (to be met in 12 treatments)  Right shoulder pain improve to 2/10 at max with heavy lifting  Progress made 3/10   Patient able to complete all ADLs and IADLs without significant pain MET  Patient to sit with more upright posture. MET  UEFI score improve to lacking 20% or less. MET; 91% functional, 9% impaired   Patient to report resolution of tingling in to right arm. -MET                 Patient goals: \"Stop shoulder from hurting\"      Pt. Education:  [x] Yes  [] No  [x] Reviewed Prior HEP/Ed  Method of Education: [x] Verbal  [x] Demo  [] Written  Resistance band: extension, rows, IR, ER, horizontal abduction; prone extension, rows, horizontal abduction, flexion.   Comprehension of Education:  [x] [place]ip [place]th understanding. [] Demonstrates understanding. [] Needs review. [x] Demonstrates/verbalizes HEP/Ed previously given. Access Code: [email or web address]: [employer]/Date: 09/14/2021Prepared by: Nora Mckee   Given to patient prior on 9.2.21  TB Shoulder extension - 1 x daily - 7 x weekly - 10 reps - 3 sets   TB Rows - 1 x daily - 7 x weekly - 10 reps - 3 sets   TB triceps - 1 x daily - 7 x weekly - 10 reps - 3 sets   TB ER - 1 x daily - 7 x weekly - 3 sets - 10 reps    TB susana ER - 1 x daily - 7 x weekly - 3 sets - 10 reps   TB IR - 1 x daily - 7 x weekly - 3 sets - 10 reps   Standing Shoulder Horizontal Abduction with Anchored Resistance - 1 x daily - 7 x weekly - 3 sets - 10 reps   Standing High Row with Resistance - 1 x daily - 7 x weekly - 3 sets - 10 reps      Plan: [x] Discharge       Time In: 9:08 am   Time Out: 10:10 am    Electronically signed by:  Kate Huff PTA

## 2021-09-24 NOTE — DISCHARGE SUMMARY
policy. [] Pt has completed prescribed number of treatment sessions. [] Other:         Electronically signed by Adilson Renteria PT on 9/24/2021 at 8:18 AM      If you have any questions or concerns, please don't hesitate to call.   Thank you for your referral.

## 2022-01-31 ENCOUNTER — OFFICE VISIT (OUTPATIENT)
Dept: FAMILY MEDICINE CLINIC | Age: 57
End: 2022-01-31
Payer: MEDICARE

## 2022-01-31 VITALS
DIASTOLIC BLOOD PRESSURE: 70 MMHG | SYSTOLIC BLOOD PRESSURE: 110 MMHG | BODY MASS INDEX: 30.38 KG/M2 | HEART RATE: 93 BPM | OXYGEN SATURATION: 98 % | WEIGHT: 177 LBS

## 2022-01-31 DIAGNOSIS — G89.29 CHRONIC INTRACTABLE HEADACHE, UNSPECIFIED HEADACHE TYPE: ICD-10-CM

## 2022-01-31 DIAGNOSIS — Z13.220 SCREENING, LIPID: ICD-10-CM

## 2022-01-31 DIAGNOSIS — R07.81 RIB PAIN ON RIGHT SIDE: Primary | ICD-10-CM

## 2022-01-31 DIAGNOSIS — F32.A MILD DEPRESSION: ICD-10-CM

## 2022-01-31 DIAGNOSIS — R51.9 CHRONIC INTRACTABLE HEADACHE, UNSPECIFIED HEADACHE TYPE: ICD-10-CM

## 2022-01-31 DIAGNOSIS — Z13.29 SCREENING FOR THYROID DISORDER: ICD-10-CM

## 2022-01-31 PROCEDURE — G8417 CALC BMI ABV UP PARAM F/U: HCPCS | Performed by: NURSE PRACTITIONER

## 2022-01-31 PROCEDURE — G8427 DOCREV CUR MEDS BY ELIG CLIN: HCPCS | Performed by: NURSE PRACTITIONER

## 2022-01-31 PROCEDURE — 3017F COLORECTAL CA SCREEN DOC REV: CPT | Performed by: NURSE PRACTITIONER

## 2022-01-31 PROCEDURE — G8484 FLU IMMUNIZE NO ADMIN: HCPCS | Performed by: NURSE PRACTITIONER

## 2022-01-31 PROCEDURE — 4004F PT TOBACCO SCREEN RCVD TLK: CPT | Performed by: NURSE PRACTITIONER

## 2022-01-31 PROCEDURE — 99214 OFFICE O/P EST MOD 30 MIN: CPT | Performed by: NURSE PRACTITIONER

## 2022-01-31 RX ORDER — OMEPRAZOLE 20 MG/1
20 CAPSULE, DELAYED RELEASE ORAL DAILY
Qty: 30 CAPSULE | Refills: 5 | Status: SHIPPED | OUTPATIENT
Start: 2022-03-31

## 2022-01-31 RX ORDER — AMITRIPTYLINE HYDROCHLORIDE 50 MG/1
TABLET, FILM COATED ORAL
Qty: 30 TABLET | Refills: 5 | Status: SHIPPED | OUTPATIENT
Start: 2022-03-31 | End: 2022-09-26 | Stop reason: SDUPTHER

## 2022-01-31 ASSESSMENT — ENCOUNTER SYMPTOMS
COUGH: 0
SHORTNESS OF BREATH: 0

## 2022-01-31 NOTE — PROGRESS NOTES
[address] [place]  [phone]    Holli Mcallister is a 62 y.o. female who presents today for her  medical conditions/complaints as noted below. Holli Mcallister is c/o of Follow-up  . HPI:     HPI   All communication done through an interpretor. Did pt for shoulder and neck. Still doing exercises at home. This has helped a little. C.o rib pain to right side of chest under breast.  Up to date on mammogram. This has been occurring for a few months. Has gotten a little better. Not sure that she has taken any meds for this issue. Nursing note reviewed and discussed with patient. Patient's medications, allergies, past medical, surgical, social and family histories were reviewed and updated asappropriate. Current Outpatient Medications   Medication Sig Dispense Refill    [START ON 3/31/2022] omeprazole (PRILOSEC) 20 MG delayed release capsule Take 1 capsule by mouth Daily 30 capsule 5    [START ON 3/31/2022] sertraline (ZOLOFT) 50 MG tablet Take 1 tablet by mouth daily 30 tablet 5    [START ON 3/31/2022] amitriptyline (ELAVIL) 50 MG tablet TAKE ONE TABLET BY MOUTH ONCE NIGHTLY 30 tablet 5     No current facility-administered medications for this visit. Past Medical History:   Diagnosis Date    Asthma     Deaf     Depression     GERD (gastroesophageal reflux disease)       Past Surgical History:   Procedure Laterality Date    CARPAL TUNNEL RELEASE Bilateral     COLONOSCOPY      COLONOSCOPY  09/21/2017     The mucosal exam was normal. The left colon was tortuous.  A 4 mm polyp was removed from transverse colon by cold biopsy forceps    HYSTERECTOMY      NH COLSC FLX W/REMOVAL LESION BY HOT BX FORCEPS N/A 9/21/2017    COLONOSCOPY POLYPECTOMY COLD BIOPSY performed by Aleks Almonte MD at [address] EGD TRANSORAL BIOPSY SINGLE/MULTIPLE N/A 9/21/2017    EGD BIOPSY performed by Aleks Almonte MD at [address] GASTROINTESTINAL ENDOSCOPY  09/21/2017    mild esophagitis     Family History   Problem Relation Age of Onset    Cancer Mother         throat    Cancer Father         colon- advanced age   Dominic Yepez Cancer Sister         hodgkins     Social History     Tobacco Use    Smoking status: Never Smoker    Smokeless tobacco: Not on file   Substance Use Topics    Alcohol use: No      No Known Allergies    Subjective:      Review of Systems   Constitutional: Negative for chills and fever. Respiratory: Negative for cough and shortness of breath. Cardiovascular: Negative for chest pain, palpitations and leg swelling. Other pertinent ROS in HPI  Objective:     /70 (Site: Left Upper Arm, Position: Sitting, Cuff Size: Large Adult)   Pulse 93   Wt 177 lb (80.3 kg)   SpO2 98%   BMI 30.38 kg/m²    Physical Exam  Constitutional:       Appearance: She is well-developed. HENT:      Right Ear: External ear normal.      Left Ear: External ear normal.      Nose: Nose normal.   Cardiovascular:      Rate and Rhythm: Normal rate and regular rhythm. Heart sounds: Normal heart sounds, S1 normal and S2 normal.   Pulmonary:      Effort: Pulmonary effort is normal. No respiratory distress. Breath sounds: Normal breath sounds. Musculoskeletal:         General: No deformity. Normal range of motion. Cervical back: Full passive range of motion without pain and normal range of motion. Skin:     General: Skin is warm and dry. Neurological:      Mental Status: She is alert and oriented to person, place, and time. Assessment/PLAN   1. Mild depression (HCC)    - CBC; Future  - Comprehensive Metabolic Panel; Future  - sertraline (ZOLOFT) 50 MG tablet; Take 1 tablet by mouth daily  Dispense: 30 tablet; Refill: 5    2. Chronic intractable headache, unspecified headache type    - amitriptyline (ELAVIL) 50 MG tablet; TAKE ONE TABLET BY MOUTH ONCE NIGHTLY  Dispense: 30 tablet; Refill: 5    3.  Rib pain on right side    - XR CHEST (2 VW); Future    4. Screening for thyroid disorder    - TSH; Future    5. Screening, lipid    - Lipid Panel; Future      RTO if symptoms worsen or fail to improve  Pt agreeable with plan      Patient given educational materials - see patientinstructions. Discussed use, benefit, and side effects of prescribed medications. All patient questions answered. Pt voiced understanding. Reviewed health maintenance. Instructed to continue current medications, diet andexercise. 1.  Kyle Frye received counseling on the following healthy behaviors: nutrition, exercise and medication adherence  2. Patient given educationalmaterials when available - see patient instructions when applicable  3. Discussed use, benefit, and side effects of prescribed medications. Barriersto medication compliance addressed. All patient questions answered. Pt voiced understanding. 4.  Reviewed prior labs and health maintenance when applicable. 5.  Continue current medications, diet and exercise. CompletedRefills   Requested Prescriptions     Signed Prescriptions Disp Refills    omeprazole (PRILOSEC) 20 MG delayed release capsule 30 capsule 5     Sig: Take 1 capsule by mouth Daily    sertraline (ZOLOFT) 50 MG tablet 30 tablet 5     Sig: Take 1 tablet by mouth daily    amitriptyline (ELAVIL) 50 MG tablet 30 tablet 5     Sig: TAKE ONE TABLET BY MOUTH ONCE NIGHTLY       This note was transcribed using dictation with Dragon services. Efforts were made to correct any errors but some words may be misinterpreted.     Electronically signed by DEEPTI Augustin CNP, CNP on 1/31/2022 at 3:19 PM

## 2022-01-31 NOTE — PROGRESS NOTES
Patient is present for 4 month f/u    Patient states she is still having pain in her right shoulder  States she has been doing PT for this

## 2022-02-07 ENCOUNTER — HOSPITAL ENCOUNTER (OUTPATIENT)
Age: 57
Setting detail: SPECIMEN
Discharge: HOME | End: 2022-02-07

## 2022-02-07 DIAGNOSIS — Z13.220 SCREENING, LIPID: ICD-10-CM

## 2022-02-07 DIAGNOSIS — Z13.29 SCREENING FOR THYROID DISORDER: ICD-10-CM

## 2022-02-07 DIAGNOSIS — F32.A MILD DEPRESSION: ICD-10-CM

## 2022-02-07 LAB
ALBUMIN SERPL-MCNC: 4.2 G/DL (ref 3.5–5.2)
ALBUMIN/GLOBULIN RATIO: 1.6 (ref 1–2.5)
ALP BLD-CCNC: 112 U/L (ref 35–104)
ALT SERPL-CCNC: 19 U/L (ref 5–33)
ANION GAP SERPL CALCULATED.3IONS-SCNC: 10 MMOL/L (ref 9–17)
AST SERPL-CCNC: 22 U/L
BILIRUB SERPL-MCNC: 0.32 MG/DL (ref 0.3–1.2)
BUN BLDV-MCNC: 13 MG/DL (ref 6–20)
BUN/CREAT BLD: ABNORMAL (ref 9–20)
CALCIUM SERPL-MCNC: 9.3 MG/DL (ref 8.6–10.4)
CHLORIDE BLD-SCNC: 103 MMOL/L (ref 98–107)
CHOLESTEROL/HDL RATIO: 2.6
CHOLESTEROL: 189 MG/DL
CO2: 26 MMOL/L (ref 20–31)
CREAT SERPL-MCNC: 0.79 MG/DL (ref 0.5–0.9)
GFR AFRICAN AMERICAN: >60 ML/MIN
GFR NON-AFRICAN AMERICAN: >60 ML/MIN
GFR SERPL CREATININE-BSD FRML MDRD: ABNORMAL ML/MIN/{1.73_M2}
GFR SERPL CREATININE-BSD FRML MDRD: ABNORMAL ML/MIN/{1.73_M2}
GLUCOSE BLD-MCNC: 104 MG/DL (ref 70–99)
HCT VFR BLD CALC: 40 % (ref 36.3–47.1)
HDLC SERPL-MCNC: 74 MG/DL
HEMOGLOBIN: 13.1 G/DL (ref 11.9–15.1)
LDL CHOLESTEROL: 101 MG/DL (ref 0–130)
MCH RBC QN AUTO: 30.5 PG (ref 25.2–33.5)
MCHC RBC AUTO-ENTMCNC: 32.8 G/DL (ref 28.4–34.8)
MCV RBC AUTO: 93 FL (ref 82.6–102.9)
NRBC AUTOMATED: 0 PER 100 WBC
PDW BLD-RTO: 11.6 % (ref 11.8–14.4)
PLATELET # BLD: 273 K/UL (ref 138–453)
PMV BLD AUTO: 9.5 FL (ref 8.1–13.5)
POTASSIUM SERPL-SCNC: 5 MMOL/L (ref 3.7–5.3)
RBC # BLD: 4.3 M/UL (ref 3.95–5.11)
SODIUM BLD-SCNC: 139 MMOL/L (ref 135–144)
TOTAL PROTEIN: 6.8 G/DL (ref 6.4–8.3)
TRIGL SERPL-MCNC: 68 MG/DL
TSH SERPL DL<=0.05 MIU/L-ACNC: 3.39 MIU/L (ref 0.3–5)
VLDLC SERPL CALC-MCNC: NORMAL MG/DL (ref 1–30)
WBC # BLD: 4.1 K/UL (ref 3.5–11.3)

## 2022-02-08 ENCOUNTER — HOSPITAL ENCOUNTER (OUTPATIENT)
Facility: CLINIC | Age: 57
Discharge: HOME | End: 2022-02-10
Payer: MEDICARE

## 2022-02-08 ENCOUNTER — HOSPITAL ENCOUNTER (OUTPATIENT)
Dept: GENERAL RADIOLOGY | Facility: CLINIC | Age: 57
Discharge: HOME | End: 2022-02-10
Payer: MEDICARE

## 2022-02-08 DIAGNOSIS — R07.81 RIB PAIN ON RIGHT SIDE: ICD-10-CM

## 2022-02-08 PROCEDURE — 71046 X-RAY EXAM CHEST 2 VIEWS: CPT

## 2022-02-08 NOTE — RESULT ENCOUNTER NOTE
Labs are ok   The 10-year ASCVD risk score (Sampson Castro et al., 2013) is: 1.3%    Values used to calculate the score:      Age: 62 years      Sex: Female      Is Non- : No      Diabetic: No      Tobacco smoker: No      Systolic Blood Pressure: 107 mmHg      Is BP treated: No      HDL Cholesterol: 74 mg/dL      Total Cholesterol: 189 mg/dL

## 2022-05-09 ENCOUNTER — NURSE TRIAGE (OUTPATIENT)
Dept: OTHER | Facility: CLINIC | Age: 57
End: 2022-05-09

## 2022-05-09 ENCOUNTER — OFFICE VISIT (OUTPATIENT)
Dept: FAMILY MEDICINE CLINIC | Age: 57
End: 2022-05-09
Payer: MEDICARE

## 2022-05-09 VITALS
HEART RATE: 110 BPM | OXYGEN SATURATION: 97 % | DIASTOLIC BLOOD PRESSURE: 80 MMHG | SYSTOLIC BLOOD PRESSURE: 134 MMHG | WEIGHT: 171 LBS | BODY MASS INDEX: 29.35 KG/M2

## 2022-05-09 DIAGNOSIS — G89.29 CHRONIC RIGHT SHOULDER PAIN: Primary | ICD-10-CM

## 2022-05-09 DIAGNOSIS — Z76.0 MEDICATION REFILL: ICD-10-CM

## 2022-05-09 DIAGNOSIS — R12 HEARTBURN: ICD-10-CM

## 2022-05-09 DIAGNOSIS — Z12.31 ENCOUNTER FOR SCREENING MAMMOGRAM FOR BREAST CANCER: ICD-10-CM

## 2022-05-09 DIAGNOSIS — R07.89 CHEST PRESSURE: ICD-10-CM

## 2022-05-09 DIAGNOSIS — M25.511 CHRONIC RIGHT SHOULDER PAIN: Primary | ICD-10-CM

## 2022-05-09 PROCEDURE — 3017F COLORECTAL CA SCREEN DOC REV: CPT | Performed by: NURSE PRACTITIONER

## 2022-05-09 PROCEDURE — G8427 DOCREV CUR MEDS BY ELIG CLIN: HCPCS | Performed by: NURSE PRACTITIONER

## 2022-05-09 PROCEDURE — 1036F TOBACCO NON-USER: CPT | Performed by: NURSE PRACTITIONER

## 2022-05-09 PROCEDURE — G8417 CALC BMI ABV UP PARAM F/U: HCPCS | Performed by: NURSE PRACTITIONER

## 2022-05-09 PROCEDURE — 99214 OFFICE O/P EST MOD 30 MIN: CPT | Performed by: NURSE PRACTITIONER

## 2022-05-09 RX ORDER — FAMOTIDINE 20 MG/1
20 TABLET, FILM COATED ORAL DAILY
Qty: 30 TABLET | Refills: 2 | Status: SHIPPED | OUTPATIENT
Start: 2022-05-09 | End: 2022-08-09

## 2022-05-09 RX ORDER — MELOXICAM 7.5 MG/1
7.5 TABLET ORAL DAILY
Qty: 30 TABLET | Refills: 3 | Status: SHIPPED | OUTPATIENT
Start: 2022-05-09 | End: 2022-09-26

## 2022-05-09 ASSESSMENT — ENCOUNTER SYMPTOMS
COUGH: 0
SHORTNESS OF BREATH: 0

## 2022-05-09 NOTE — TELEPHONE ENCOUNTER
Received call from Michael at [facility] with [employer]. Subjective: Caller is patient's sister and she states \"She is having pains in her ribs and chest. It is on both sides and underneath her breast and it goes into her back\"     Current Symptoms: chest pain, back pain    Onset: 2 weeks ago; unchanged    Associated Symptoms: NA    Pain Severity:no pain right now    Temperature: none     What has been tried: Ibuprofen helped    LMP: NA Pregnant: NA    Recommended disposition: See in Office Today    Care advice provided, patient verbalizes understanding; denies any other questions or concerns; instructed to call back for any new or worsening symptoms. Patient/Caller agrees with recommended disposition; writer provided warm transfer to Nohelia John at [facility] for appointment scheduling     Attention Provider: Thank you for allowing me to participate in the care of your patient. The patient was connected to triage in response to information provided to the ECC/PSC. Please do not respond through this encounter as the response is not directed to a shared pool.           Reason for Disposition   All other patients with chest pain (Exception: fleeting chest pain lasting a few seconds)    Protocols used: CHEST PAIN-ADULT-OH

## 2022-05-09 NOTE — PROGRESS NOTES
Patient is present with sister complaining of chest pain  Patient states the pain is under her breast and into her back  States this started in February and improved and states this started again in March  Patient states she has this pain every day  States this pain is mostly on the right side  States she does not have shortness of breath  Patient was seen in January and states this is the same pain she was having then

## 2022-05-09 NOTE — PROGRESS NOTES
Gabriella Huynh (:  1965) is a 62 y.o. female,Established patient, here for evaluation of the following chief complaint(s):  Chest Pain         ASSESSMENT/PLAN:  1. Chronic right shoulder pain  -     [facility] Ormond, MD, Orthopedic Surgery, [facility]  2. Medication refill  -     meloxicam (MOBIC) 7.5 MG tablet; Take 1 tablet by mouth daily, Disp-30 tablet, R-3Normal  3. Chest pressure  -     EKG 12 lead; Future  4. Encounter for screening mammogram for breast cancer  5. Heartburn  -     famotidine (PEPCID) 20 MG tablet; Take 1 tablet by mouth daily, Disp-30 tablet, R-2Normal      No follow-ups on file. Subjective   SUBJECTIVE/OBJECTIVE:  HPI   All communication done through interpretor. Pain in her chest since February. Does have some heart burn complaints. Sister gave her some heart burn meds, maybe helped. Still having chronic right shoulder pain. Not doing any exercises or stretching at home. Pt did help some when se was doing it. Review of Systems   Constitutional: Negative for chills and fever. Respiratory: Negative for cough and shortness of breath. Cardiovascular: Negative for chest pain, palpitations and leg swelling. Objective      Vitals:    22 1052   BP: 134/80   Pulse: 110   SpO2: 97%     Wt Readings from Last 3 Encounters:   22 171 lb (77.6 kg)   22 171 lb (77.6 kg)   22 177 lb (80.3 kg)       Physical Exam  Constitutional:       Appearance: She is well-developed. HENT:      Right Ear: External ear normal.      Left Ear: External ear normal.      Nose: Nose normal.   Cardiovascular:      Rate and Rhythm: Normal rate and regular rhythm. Heart sounds: Normal heart sounds, S1 normal and S2 normal.   Pulmonary:      Effort: Pulmonary effort is normal. No respiratory distress. Breath sounds: Normal breath sounds. Musculoskeletal:         General: No deformity. Normal range of motion.       Cervical back: Full passive range of motion without pain and normal range of motion. Skin:     General: Skin is warm and dry. Neurological:      Mental Status: She is alert and oriented to person, place, and time. An electronic signature was used to authenticate this note.     --DEEPTI Amin - CNP

## 2022-05-11 ENCOUNTER — TELEPHONE (OUTPATIENT)
Dept: ORTHOPEDIC SURGERY | Age: 57
End: 2022-05-11

## 2022-05-11 DIAGNOSIS — R07.89 CHEST PRESSURE: ICD-10-CM

## 2022-05-11 PROCEDURE — 93000 ELECTROCARDIOGRAM COMPLETE: CPT | Performed by: NURSE PRACTITIONER

## 2022-05-17 ENCOUNTER — HOSPITAL ENCOUNTER (OUTPATIENT)
Dept: MAMMOGRAPHY | Age: 57
Discharge: HOME | End: 2022-05-19
Payer: MEDICARE

## 2022-05-17 DIAGNOSIS — Z12.31 ENCOUNTER FOR SCREENING MAMMOGRAM FOR BREAST CANCER: ICD-10-CM

## 2022-05-17 PROCEDURE — 77063 BREAST TOMOSYNTHESIS BI: CPT

## 2022-05-18 ENCOUNTER — OFFICE VISIT (OUTPATIENT)
Dept: ORTHOPEDIC SURGERY | Age: 57
End: 2022-05-18
Payer: MEDICARE

## 2022-05-18 VITALS — HEIGHT: 64 IN | BODY MASS INDEX: 29.19 KG/M2 | WEIGHT: 171 LBS

## 2022-05-18 DIAGNOSIS — M25.511 RIGHT SHOULDER PAIN, UNSPECIFIED CHRONICITY: ICD-10-CM

## 2022-05-18 DIAGNOSIS — M75.81 TENDINITIS OF RIGHT ROTATOR CUFF: Primary | ICD-10-CM

## 2022-05-18 PROCEDURE — G8417 CALC BMI ABV UP PARAM F/U: HCPCS | Performed by: ORTHOPAEDIC SURGERY

## 2022-05-18 PROCEDURE — 99204 OFFICE O/P NEW MOD 45 MIN: CPT | Performed by: ORTHOPAEDIC SURGERY

## 2022-05-18 PROCEDURE — 3017F COLORECTAL CA SCREEN DOC REV: CPT | Performed by: ORTHOPAEDIC SURGERY

## 2022-05-18 PROCEDURE — G8427 DOCREV CUR MEDS BY ELIG CLIN: HCPCS | Performed by: ORTHOPAEDIC SURGERY

## 2022-05-18 PROCEDURE — 1036F TOBACCO NON-USER: CPT | Performed by: ORTHOPAEDIC SURGERY

## 2022-05-18 RX ORDER — DICLOFENAC SODIUM 75 MG/1
75 TABLET, DELAYED RELEASE ORAL 2 TIMES DAILY WITH MEALS
Qty: 28 TABLET | Refills: 0 | Status: SHIPPED | OUTPATIENT
Start: 2022-05-18 | End: 2022-06-08

## 2022-05-18 NOTE — PROGRESS NOTES
Orthopedic Shoulder Encounter Note     Chief complaint: right shoulder pain    HPI: Roberta Mcarthur is a 62 y.o.  right-hand dominant female who presents for evaluation of her right shoulder. She is hard of hearing and so history was obtained through a  as well as her sister who was present with her. She indicates that she has been having pain for about a year now. She denies any precipitating trauma or injury. The pain is primarily localized to the posterior shoulder but does extent of her neck and down her arm but not past the elbow. It is fairly constant and is associated with stiffness but she denies having any weakness. She does have a hard time raising her arm due to pain and it does bother her at night as well. Previous treatment:    NSAIDs: Mobic, ibuprofen    Physical Therapy: Yes    Injections: None    Surgeries: None    Review of Systems:   Constitutional: Negative for fever, chills, sweats. Pain Score:   5  Neurological: Negative for headache, numbness, or weakness. Musculoskeletal: As noted in HPI     Past Medical History  Colin Raya  has a past medical history of Asthma, Deaf, Depression, and GERD (gastroesophageal reflux disease). Past Surgical History  Colin Raya  has a past surgical history that includes Hysterectomy; Carpal tunnel release (Bilateral); Colonoscopy; Upper gastrointestinal endoscopy (09/21/2017); Colonoscopy (09/21/2017); pr egd transoral biopsy single/multiple (N/A, 9/21/2017); and pr colsc flx w/removal lesion by hot bx forceps (N/A, 9/21/2017).     Current Medications  Current Outpatient Medications   Medication Sig Dispense Refill    diclofenac (VOLTAREN) 75 MG EC tablet Take 1 tablet by mouth 2 times daily (with meals) 28 tablet 0    meloxicam (MOBIC) 7.5 MG tablet Take 1 tablet by mouth daily 30 tablet 3    famotidine (PEPCID) 20 MG tablet Take 1 tablet by mouth daily 30 tablet 2    omeprazole (PRILOSEC) 20 MG delayed release capsule Take 1 capsule by mouth Daily 30 capsule 5    sertraline (ZOLOFT) 50 MG tablet Take 1 tablet by mouth daily 30 tablet 5    amitriptyline (ELAVIL) 50 MG tablet TAKE ONE TABLET BY MOUTH ONCE NIGHTLY 30 tablet 5     No current facility-administered medications for this visit. Allergies  Allergies have been reviewed. Sohail Gamino has No Known Allergies. Social History  Sohail Gamino  reports that she has never smoked. She has never used smokeless tobacco. She reports that she does not drink alcohol and does not use drugs. Family History  Berna's family history includes Cancer in her father, mother, and sister. Physical Exam:     Ht 5' 4\" (1.626 m)   Wt 171 lb (77.6 kg)   BMI 29.35 kg/m²    Constitutional: Patient is oriented to person, place, and time. Patient appears well-developed and well nourished. Mental Status/psychiatric: Behavior is normal. Thought content normal.  HENT: Negative otherwise noted  Head: Normocephalic and Atraumatic  Nose: Normal  Respiratory/Cardio: Effort normal. No respiratory distress. Neck: Normal range of motion Neck supple. Shoulder:    Skin: Skin is warm and dry; no swelling or obvious muscular atrophy.    Vasculature: 2+ radial pulses bilaterally  Neuro: Sensation grossly intact to light touch diffusely  Tenderness: Nontender to palpation    ROM: (Degrees)    Right   A P   Left   A P    Elevation  135 140   Elevation  135   Abduction  115 150   Abduction  145    ER   70 75   ER   75   IR   L3    IR   L3   90 abd/ER      90 abd/ER     90 abd/IR      90 abd/IR     Crepitation  No    Crepitation No  Dyskenesia  No    Dyskenesia No      Muscle strength:    Right       Left    Deltoid   5    Deltoid   5  Supraspinatus  5    Supraspinatus  5  ER   5    ER   5  IR   5    IR   5    Special tests    Right   Rotator Cuff    Left    y   Painful arc    n   n   Pain with ER    n    n   Neer's     n    y   Hawkin's    n    n   Drop Arm    n  n   Lift off/Belly Press   n  n   ER Lag    n          AC Joint  n   AC tenderness   n  n   Cross-chest adduction  n       Labrum/biceps    y   Elkhart's    n   n   Biceps sheer    n      n   Speed's/Yergason's   n    n   Tenderness Biceps Groove  n    n   Jayant's    n         Instability  n   Ant Apprehension   n    n   Post Apprehension   n    n   Ant Load shift    n    n   Post Load shift   n   n   Sulcus     n  n   Generalized Laxity   n  n   Relocation test   n  n   Crank test     n  n   Devang-superior escape  n       Imaging:  Xrays: 4 views of the right shoulder obtained on 5/18/2022 and additional views completed on 5/11/2021 were independently viewed  Indications: Right shoulder pain  Findings: Normal glenohumeral joint space. Mild osteophytic change involving the distal clavicle at the acromioclavicular joint. Type II acromion. No obvious fracture, dislocation or subluxation. Impression: Right shoulder radiographs with mild degenerative changes at the acromioclavicular joint as outlined above. The report of the right shoulder x-ray from 5/11/2021 were also reviewed suggestive of mild osteoarthritis of the acromioclavicular joint which I agree with. Impression/Plan:     Sharmin Velásquez is a 62 y.o. old female with right shoulder pain. We had a discussion about possible etiologies. I believe she has likely rotator cuff tendinitis/impingement. We discussed treatment options available to her and I recommended proceeding conservatively at this time. I did recommend another round of physical therapy and had a discussion about a cortisone injection versus prescription NSAID. She is declined a cortisone injection at this time and would rather go with the prescription anti-inflammatory. Consequently prescription of Voltaren was electronically sent to her pharmacy. I will see her back in my clinic as needed but she may return or call at anytime with persistent or worsening symptoms and with any questions or concerns.   I would recommend an MRI study of her shoulder prior to that visit. This note is created with the assistance of a speech recognition program.  While intending to generate adocument that actually reflects the content of the visit, the document can still have some errors including those of syntax and sound a like substitutions which may escape proof reading. It such instances, actual meaningcan be extrapolated by contextual diversion.     NA = Not assessed  RTC = Rotator cuff  RCT = Rotator cuff tear  ER = External rotation  IR = Internal rotation  AC = Acromioclavicular  GH = Glenohumeral  n = No  y = Yes

## 2022-05-18 NOTE — LETTER
5/18/2022    Joseph March, APRN - CNP  [address]  [facility],  [address] [place] [address]    RE: Ambar Núñez    Dear Dr. Srini March,    Thank you for allowing me to participate in the care of Ms. Aldon Dubin. I had the opportunity to evaluate the patient on 5/18/2022. Attached you will find my evaluation and recommendations. Thanks again for the confidence you have expressed in me by allowing my participation in the care of your patient. I will keep you apprised of further developments in the patients treatment course as it progresses. If I can be of further assistance in any fashion, please feel free to contact me at your convenience.     Sincerely,         Karl Esqueda  Shoulder and Elbow Surgery

## 2022-05-24 ENCOUNTER — HOSPITAL ENCOUNTER (OUTPATIENT)
Dept: PHYSICAL THERAPY | Age: 57
Setting detail: THERAPIES SERIES
Discharge: HOME | End: 2022-05-24
Payer: MEDICARE

## 2022-05-24 PROCEDURE — 97161 PT EVAL LOW COMPLEX 20 MIN: CPT

## 2022-05-24 PROCEDURE — 97140 MANUAL THERAPY 1/> REGIONS: CPT

## 2022-05-24 NOTE — CONSULTS
[x] [facility]) [facility]  [address]  [phone]  F: [phone] [] [address]  [place] [address]   [address]  P: [phone]  F: [phone] [] [place]  [address]  P: [phone]  F: [phone] [] [address]  P: [phone]  F: [phone] [] [address]  [facility]   [address]   [place]: [phone]  F: [phone]      Physical Therapy Upper Extremity Evaluation    Date:  2022  Patient: Jamaica Priest  : 1965  MRN: [id number]  Physician: Jose Fonseca MD     Insurance: Medicare,  for Life, Medicaid  Medical Diagnosis: Right shoulder pain, tendinitis of right rotator cuff    Rehab Codes: M 25.511, M 25.611, M 54.2, M 62.81, R 20.2, R 29.3  Onset Date: 22     Next 's appt: Not scheduled    Subjective:   CC: Cannot vacuum, pain is from neck down to mid part of right arm. Unaware of what Dr. Nazia Werner is. States right arm is painful with movement, as is her neck. HPI: (22)  Has not fallen recently. Has bands from therapy last year, occasionally does HEP. Reports not doing more often as she is busy. States she is busy taking care of household chores. PMHx: [] Unremarkable [] Diabetes [] HTN  [] Pacemaker   [] MI/Heart Problems [] Cancer [x] Arthritis [x] Other: deaf, anxiety, depression, vision problems. Carpal tunnel surgery bilateral.                [x] Refer to full medical chart  In EPIC     Comorbidities:   [x] Obesity [] Dialysis  [] N/A   [x] Asthma- has not used an inhaler in a long time.  [] Dementia [x] Other: on the Autism spectrum per sister from previous evaluation   [] Stroke [] Sleep apnea [x] Other: GERD   [] Vascular disease [] Rheumatic disease [x] Other: deaf     Tests: [x] X-Ray: 5/10/21   1. No acute osseous abnormality. 2. Mild AC joint osteoarthritis. [] MRI:  [] Other:    Medications: [x] Refer to full medical record [] None [] Other:  Allergies:      [x] Refer to full medical record [] None [] Other:    Function:  Hand Dominance  [x] Right  [] Left  Patient lives with: Sister   In what type of home []  One story   [x] Two story, bedroom upstairs   [] Split level   Number of stairs to enter Bathroom on main level. No steps to enter. With handrail on the []  Right to enter   [] Left to enter   Bathroom has a []  Tub only  [x] Tub/shower combo   [] Walk in shower    []  Grab bars   Washing machine is on [x]  Main level   [] Second level   [] Basement   Employer    Job Status []  Normal duty   [] Light duty   [] Off due to condition    []  Retired   [x] Not employed   [] Disability  [] Other:  []  Return to work: Work activities/duties      ADL/IADL Previous level of function Current level of function Who currently assists the patient with task   Bathing  [x] Independent  [] Assist [x] Independent  [] Assist    Dress/grooming [x] Independent  [] Assist [x] Independent  [] Assist    Transfer/mobility [x] Independent  [] Assist [x] Independent  [] Assist    Feeding [x] Independent  [] Assist [x] Independent  [] Assist    Toileting [x] Independent  [] Assist [x] Independent  [] Assist    Driving [x] Independent  [] Assist [x] Independent  [] Assist    Housekeeping [x] Independent  [] Assist [x] Independent  [] Assist    Grocery shop/meal prep [x] Independent  [] Assist [x] Independent  [] Assist    Reports also cleaning/caring for pool that they have opened already at her home.   Gait Prior level of function Current level of function    [x] Independent  [] Assist [x] Independent  [] Assist   Device: [x] Independent [x] Independent    [] Straight Cane [] Quad cane [] Straight Cane [] Quad cane    [] Standard walker [] Rolling walker [] 4 wheeled walker [] Standard walker [] Rolling walker   [] 4 wheeled walker    [] Wheelchair [] Wheelchair     Pain:  [x] Yes  [] No Location: Right shoulder   Pain Rating: (0-10 scale) 3+/10  Pain altered Tx:  [] Yes  [x] No  Action:    Symptoms:  [] Improving [x] Worsening [] Same  Better:  [] AM    [] PM    [] Sit    [] Rise/Sit    []Stand    [] Walk    [] Lying    [x] Other: resting it  Worse: [] AM    [] PM    [] Sit    [] Rise/Sit    []Stand    [] Walk    [] Lying    [] Bend                      [] Valsalva    [x] Other: using it, cold  Sleep: [] OK    [x] Disturbed    Objective:     ROM  °A/P STRENGTH TESTS (+/-) Right    Left Right Left Right Drop Arm    Sit Shld Flex 151 140 4 4- pn  Sulcus Sign    Sit Shld Abd 130 111 4 4- pn Apprehension    Sit Shld IR     Yergasons -   Shoulder Flex 170 160 4 4- Speeds -   Ext     Neer -    115 4 4- Chen  -   ER @ 0 45 90 90 60 4 4- Painful Arc    IR 90 80 4 4- Tinel    Seated ER to post head, = bilateral  Seated IR to L1, = bilateral    Cervical ROM:  Ext 36  Flex 42  Right rotation 80 deg  Left rotation 65 deg  Right side bend 30  Left side bend 15    All special tests were negative per report from ipad and from facial expressions and verbalizing pain (makes sounds when in pain).     OBSERVATION No Deficit Deficit Not Tested Comments   Forward Head [] [x] []    Rounded Shoulders [] [x] []    Kyphosis [] [x] []    Scapular Height/Position [] [x] [] Protracted    Winging [x] [] []    Scapulohumeral Rhythm [] [x] [] Early scapular elevation   INSPECTION/PALPATION       SC/AC Joint [x] [] []    Supraspinatus [x] [] []    Biceps tendon/groove [] [x] [] Tender   Posterior shld [] [x] [] Tender   Subscapularis [] [x] [] Tender   NEUROLOGICAL       Cervical ROM/Quadrant [x] [] []    Reflexes [] [] [x]    Compression/Distraction [] [] [x]    Sensation [] [x] [] Tingling in hands with stretching of neck     Functional Test: UEFI Score: 64% functionally impaired Comments:    Assessment:  Patient would benefit from skilled physical therapy services in order to: improved posture, improved ROM, improved strength, decreased tingling/numbness in arm, decreased pain in right shoulder and down right arm, decrease cervical spine pain, improve headaches. Problems:    [x] ? Pain:  [x] ? ROM:  [x] ? Strength:  [x] ? Function:  [] Other:      STG: (to be met in 6 treatments)  1. ? Pain: Right shoulder pain improve to 5/10 with active movements  2. ? ROM: Seated right shoulder abduction improve to 130 degrees without pain. 3. Supine right shoulder abduction improve to 140 degrees without pain. 4. ? Strength: Right shoulder strength improve to 4/5 without pain  5. ? Function: Patient to report chores around her home have become easier  6. Patient to be independent with home exercise program as demonstrated by performance with correct form without cues. LTG: (to be met in 12 treatments)  1. Right shoulder pain improve to 2/10 at max with heavy lifting  2. Supine right shoulder ER improve to 90 degrees, IR to 90 degrees. 3. Patient able to complete all ADLs and IADLs without significant pain  4. Patient to sit with more upright posture. 5. UEFI score improve to lacking 35% or less. 6. Patient to report resolution of pain radiating in to right arm  7. Cervical ROM improve to: flexion 50 degrees, extension 45 degrees, left rotation 80 degrees, left side bend 30 degrees. Patient goals: (none stated)    Rehab Potential:  [x] Good  [x] Fair  [] Poor   Suggested Professional Referral:  [x] No  [] Yes:  Barriers to Goal Achievement:  [] No  [x] Yes: poor posture; prior therapy with good results (decreased pain, improved function). Has not kept up with exercises at home.     Domestic Concerns:  [x] No  [] Yes:    Pt. Education:  [x] Plans/Goals, Risks/Benefits discussed  [] Home exercise program  Method of Education: [x] Verbal  [] Demo  [] Written -- POC (only completed manual therapy this date)  Comprehension of Education:  [] Verbalizes understanding. [] Demonstrates understanding. [x] Needs Review. [] Demonstrates/verbalizes understanding of HEP/Ed previously given. Treatment Plan:  [x] Therapeutic Exercise   53992  [x] Iontophoresis: 4 mg/mL Dexamethasone Sodium Phosphate  mAmin  33973   [x] Therapeutic Activity  05397 [x] Vasopneumatic cold with compression  42735    [] Gait Training   94281 [x] Ultrasound   86153   [] Neuromuscular Re-education  48332 [x] Electrical Stimulation Unattended  53575   [x] Manual Therapy  13950 [] Electrical Stimulation Attended  97231   [x] Instruction in HEP  [x] Cervical Traction  00987   [] Aquatic Therapy   35678 [x] Cold/hotpack    [] Massage   37261      [] Dry Needling, 1 or 2 muscles  89927   [] Biofeedback, first 15 minutes   83799  [] Biofeedback, additional 15 minutes   37070 [] Dry Needling, 3 or more muscles  06160     [x]  Medication allergies reviewed for use of    Dexamethasone Sodium Phosphate 4mg/ml     with iontophoresis treatments. Pt is not allergic. Frequency: 2 x/week for 12 visits    Todays Treatment:  Modalities:   Precautions: IPad interpretor for ASL. Per sister, on the autism spectrum. From Dr. Alston Schilder: Work on Right shoulder ROM. RTC/Scapular stabilizer strengthening. Biceps strengthening. Also work on stretching/strengthening Cervical spine. Evaluate and treat. Modalities as needed. Teach home exercise program.   2-3 times a week for 4-6 weeks. Exercises:  Exercise Reps/ Time Weight/ Level Comments   Manual therapy 10 min  Trigger point release to bilateral upper traps and cervical spine paraspinals, CPROM, cervical distraction, trigger point release to periscapular areas bilaterally. Other: Some soreness noted in right shoulder with cervical paraspinal trigger point release.   Patient has poor posture and explained how poor posture will contribute to right shoulder pain as she is impinging the structures within the shoulder. Specific Instructions for next treatment: Continue manual therapy to cervical spine and right shoulder. Can add traction. UBE, pulleys, table slides, cane exercises. Progress to weighted exercises. Supine over half roll to stretch chest.  Posture exercises. Can use modalities as needed for pain control. Once signed, can use ionto to right shoulder. (Same from previous eval). Evaluation Complexity:  History (Personal factors, comorbidities) [] 0 [] 1-2 [x] 3+   Exam (limitations, restrictions) [] 1-2 [x] 3 [] 4+   Clinical presentation (progression) [x] Stable [] Evolving  [] Unstable   Decision Making [x] Low [] Moderate [] High    [x] Low Complexity [] Moderate Complexity [] High Complexity       Treatment Charges: Mins Units   [x] Evaluation       [x]  Low       []  Moderate       []  High 20 1   []  Modalities     []  Ther Exercise     [x]  Manual Therapy 10 1   []  Ther Activities     []  Aquatics     []  Vasocompression     []  Other       TOTAL TREATMENT TIME: 30    Time in: 1524    Time Out: 1600    Electronically signed by: Gayatri Casper PT    BY SIGNING BELOW, YOU ARE AGREEING TO THE TREATMENT OF CERVICAL PAIN, M 54.2. Physician Signature:________________________________Date:__________________  By signing above or cosigning this note, I have reviewed this plan of care and certify a need for medically necessary rehabilitation services.      *PLEASE SIGN ABOVE AND FAX BACK ALL PAGES*

## 2022-06-01 ENCOUNTER — HOSPITAL ENCOUNTER (OUTPATIENT)
Dept: PHYSICAL THERAPY | Age: 57
Setting detail: THERAPIES SERIES
Discharge: HOME | End: 2022-06-01
Payer: MEDICARE

## 2022-06-01 PROCEDURE — 97140 MANUAL THERAPY 1/> REGIONS: CPT

## 2022-06-01 PROCEDURE — 97110 THERAPEUTIC EXERCISES: CPT

## 2022-06-01 NOTE — FLOWSHEET NOTE
[x] [facility] &  Therapy  [address]  [phone]  F: [phone] [] [address]  [facility]   [address]  P: [phone]  F: [phone] [] [address]  [address]  P: [phone]  F: [phone] [] [address]  P: [phone]  F: [phone] [] [address]  [facility]   [address]   [place]: [phone]  F: [phone]      Physical Therapy Daily Treatment Note    Date:  2022  Patient Name:  Tuan De Jesus    :  1965  MRN: [id number]  Physician: Kelle Lujan MD                                   Insurance: Medicare, [address] [place] [address], Medicaid  Medical Diagnosis: Right shoulder pain, tendinitis of right rotator cuff                     Rehab Codes: M 25.511, M 25.611, M 54.2, M 62.81, R 20.2, R 29.3  Onset Date: 22                 Next 's appt: Not scheduled  Visit# / total visits: ; Progress note for Medicare patient due at visit 10     Cancels/No Shows: 0/0    Subjective:    Pain:  [x] Yes  [] No Location: Right shoulder   Pain Rating: (0-10 scale) Unable to state, some pain/10  Pain altered Tx:  [x] No  [] Yes  Action:  Comments: Using ipad for interpretation, states she has some pain, but not a lot. Felt fine after evaluation. Does not want to schedule, states her sister will call to schedule. Used  over the weekend in order to clean her home. Objective:  Modalities: Refused need 22   Precautions:  IPad interpretor for ASL. Per sister, on the autism spectrum. From Dr. Cleveland Ji: Work on Right shoulder ROM. RTC/Scapular stabilizer strengthening. Biceps strengthening. Also work on stretching/strengthening Cervical spine. Evaluate and treat.  Modalities as needed. Teach home exercise program.   2-3 times a week for 4-6 weeks. Exercises:  Exercise Reps/ Time Weight/ Level Comments   SCIFIT 5 min L 2.0 Change to UBE next visit. Corner stretch 3 x 10 sec Reported some discomfort after all 3; instructed to stretch less next Rx   Reverse wall push ups 10 x     Wall push up on medium green ball 10 x     Ball flexion on wall 10 x  Lime green medium ball   Small circles on wall 10 x ea  CW, CCW; lime green medium ball. Horizontal abduction 10  Lime Fatiguing    B ER 10 Lime          Biceps curls 10 x 2 lb    Rows 10 x Lime           Upper trap stretch with arm behind back 3 x ea 10 sec Some N/T right arm with stretch. HEP         Manual therapy in supine 12 min  PROM to right shoulder, PCROM, trigger point releases. Posture 2 min  Education. HEP to stretch 10 x per hour, holding 5 seconds; repeat 10 x throughout the day. Other:      Treatment Charges: Mins Units   []  Modalities     [x]  Ther Exercise 30 2   []  Manual Therapy 12 1   []  Ther Activities     []  Aquatics     []  Vasocompression     []  Other     Total Treatment time 42        Assessment: [x] Progressing toward goals. Added exs with fair tolerance. Some discomfort at end ranges of motion. Education provided on why she needs to sit with better posture. Patient very forwardly rotated. Given for HEP this date for patient to focus on more. [] No change. [] Other:  [x] Patient would continue to benefit from skilled physical therapy services in order to: improved posture, improved ROM, improved strength, decreased tingling/numbness in arm, decreased pain in right shoulder and down right arm, decrease cervical spine pain, improve headaches. STG: (to be met in 6 treatments)  1. ? Pain: Right shoulder pain improve to 5/10 with active movements  2. ? ROM: Seated right shoulder abduction improve to 130 degrees without pain.   3. Supine right shoulder abduction improve to 140 degrees without pain. 4. ? Strength: Right shoulder strength improve to 4/5 without pain  5. ? Function: Patient to report chores around her home have become easier  6. Patient to be independent with home exercise program as demonstrated by performance with correct form without cues.     LTG: (to be met in 12 treatments)  1. Right shoulder pain improve to 2/10 at max with heavy lifting  2. Supine right shoulder ER improve to 90 degrees, IR to 90 degrees. 3. Patient able to complete all ADLs and IADLs without significant pain  4. Patient to sit with more upright posture. 5. UEFI score improve to lacking 35% or less. 6. Patient to report resolution of pain radiating in to right arm  7. Cervical ROM improve to: flexion 50 degrees, extension 45 degrees, left rotation 80 degrees, left side bend 30 degrees. Patient goals: (none stated)    Pt. Education:  [x] Yes  [] No  [x] Reviewed Prior HEP/Ed  Method of Education: [] Verbal  [x] Demo  [x] Written -- posture, upper trap stretch. Comprehension of Education:  [] Verbalizes understanding. [] Demonstrates understanding. [x] Needs review. [] Demonstrates/verbalizes HEP/Ed previously given. Plan: [x] Continue current frequency toward long and short term goals. [x] Specific Instructions for subsequent treatments: Continue manual therapy to cervical spine and right shoulder. Can add traction. UBE, pulleys, table slides, cane exercises. Progress to weighted exercises. Supine over half roll to stretch chest.  Posture exercises. Can use modalities as needed for pain control. Once signed, can use ionto to right shoulder. (Same from previous eval).       Time In:1208            Time Out: 1466    Electronically signed by:  Rosi Ortiz, PT

## 2022-06-06 ENCOUNTER — OFFICE VISIT (OUTPATIENT)
Dept: FAMILY MEDICINE CLINIC | Age: 57
End: 2022-06-06
Payer: MEDICARE

## 2022-06-06 VITALS
WEIGHT: 169 LBS | OXYGEN SATURATION: 99 % | SYSTOLIC BLOOD PRESSURE: 130 MMHG | BODY MASS INDEX: 28.85 KG/M2 | HEIGHT: 64 IN | HEART RATE: 99 BPM | DIASTOLIC BLOOD PRESSURE: 70 MMHG

## 2022-06-06 DIAGNOSIS — Z00.00 INITIAL MEDICARE ANNUAL WELLNESS VISIT: Primary | ICD-10-CM

## 2022-06-06 PROCEDURE — 3017F COLORECTAL CA SCREEN DOC REV: CPT | Performed by: NURSE PRACTITIONER

## 2022-06-06 PROCEDURE — G0438 PPPS, INITIAL VISIT: HCPCS | Performed by: NURSE PRACTITIONER

## 2022-06-06 ASSESSMENT — PATIENT HEALTH QUESTIONNAIRE - PHQ9
2. FEELING DOWN, DEPRESSED OR HOPELESS: 1
SUM OF ALL RESPONSES TO PHQ QUESTIONS 1-9: 1
7. TROUBLE CONCENTRATING ON THINGS, SUCH AS READING THE NEWSPAPER OR WATCHING TELEVISION: 0
4. FEELING TIRED OR HAVING LITTLE ENERGY: 0
SUM OF ALL RESPONSES TO PHQ QUESTIONS 1-9: 1
10. IF YOU CHECKED OFF ANY PROBLEMS, HOW DIFFICULT HAVE THESE PROBLEMS MADE IT FOR YOU TO DO YOUR WORK, TAKE CARE OF THINGS AT HOME, OR GET ALONG WITH OTHER PEOPLE: 0
SUM OF ALL RESPONSES TO PHQ QUESTIONS 1-9: 1
SUM OF ALL RESPONSES TO PHQ QUESTIONS 1-9: 1
3. TROUBLE FALLING OR STAYING ASLEEP: 0
5. POOR APPETITE OR OVEREATING: 0
6. FEELING BAD ABOUT YOURSELF - OR THAT YOU ARE A FAILURE OR HAVE LET YOURSELF OR YOUR FAMILY DOWN: 0
SUM OF ALL RESPONSES TO PHQ9 QUESTIONS 1 & 2: 1
9. THOUGHTS THAT YOU WOULD BE BETTER OFF DEAD, OR OF HURTING YOURSELF: 0
1. LITTLE INTEREST OR PLEASURE IN DOING THINGS: 0
8. MOVING OR SPEAKING SO SLOWLY THAT OTHER PEOPLE COULD HAVE NOTICED. OR THE OPPOSITE, BEING SO FIGETY OR RESTLESS THAT YOU HAVE BEEN MOVING AROUND A LOT MORE THAN USUAL: 0

## 2022-06-06 ASSESSMENT — LIFESTYLE VARIABLES: HOW OFTEN DO YOU HAVE A DRINK CONTAINING ALCOHOL: NEVER

## 2022-06-06 NOTE — PATIENT INSTRUCTIONS
Personalized Preventive Plan for Mario Blood - 6/6/2022  Medicare offers a range of preventive health benefits. Some of the tests and screenings are paid in full while other may be subject to a deductible, co-insurance, and/or copay. Some of these benefits include a comprehensive review of your medical history including lifestyle, illnesses that may run in your family, and various assessments and screenings as appropriate. After reviewing your medical record and screening and assessments performed today your provider may have ordered immunizations, labs, imaging, and/or referrals for you. A list of these orders (if applicable) as well as your Preventive Care list are included within your After Visit Summary for your review. Other Preventive Recommendations:    · A preventive eye exam performed by an eye specialist is recommended every 1-2 years to screen for glaucoma; cataracts, macular degeneration, and other eye disorders. · A preventive dental visit is recommended every 6 months. · Try to get at least 150 minutes of exercise per week or 10,000 steps per day on a pedometer . · Order or download the FREE \"Exercise & Physical Activity: Your Everyday Guide\" from [employer]. Call 6-255.554.4986 or search [employer] online. · You need 2174-1805 mg of calcium and 3347-9526 IU of vitamin D per day. It is possible to meet your calcium requirement with diet alone, but a vitamin D supplement is usually necessary to meet this goal.  · When exposed to the sun, use a sunscreen that protects against both UVA and UVB radiation with an SPF of 30 or greater. Reapply every 2 to 3 hours or after sweating, drying off with a towel, or swimming. · Always wear a seat belt when traveling in a car. Always wear a helmet when riding a bicycle or motorcycle.

## 2022-06-06 NOTE — PROGRESS NOTES
Medicare Annual Wellness Visit    Sylvia Gómez is here for Medicare AWV    Assessment & Plan   Initial Medicare annual wellness visit      Recommendations for Preventive Services Due: see orders and patient instructions/AVS.  Recommended screening schedule for the next 5-10 years is provided to the patient in written form: see Patient Instructions/AVS.     Return for Medicare Annual Wellness Visit in 1 year. Subjective       Patient's complete Health Risk Assessment and screening values have been reviewed and are found in Flowsheets. The following problems were reviewed today and where indicated follow up appointments were made and/or referrals ordered.     Positive Risk Factor Screenings with Interventions:               General Health and ACP:  General  In general, how would you say your health is?: Very Good  In the past 7 days, have you experienced any of the following: New or Increased Pain, New or Increased Fatigue, Loneliness, Social Isolation, Stress or Anger?: (!) Yes  Select all that apply: (!) New or Increased Pain  Do you get the social and emotional support that you need?: Yes  Do you have a Living Will?: (!) No    Advance Directives     Power of  Living Will ACP-Advance Directive ACP-Power of     Not on File Not on File Not on File Not on File      General Health Risk Interventions:  · Sister is working on getting her involved in a day program for deaf people  · Sister will work on living will    Health Habits/Nutrition:     Physical Activity: Insufficiently Active    Days of Exercise per Week: 2 days    Minutes of Exercise per Session: 10 min     Have you lost any weight without trying in the past 3 months?: No  Body mass index: (!) 29.01  Have you seen the dentist within the past year?: (!) No    Health Habits/Nutrition Interventions:  · Nutritional issues:  patient is not ready to address his/her nutritional/weight issues at this time  · Dental exam overdue:  patient encouraged to make appointment with his/her dentist             Objective   Vitals:    06/06/22 1603   BP: 130/70   Site: Left Upper Arm   Position: Sitting   Cuff Size: Large Adult   Pulse: 99   SpO2: 99%   Weight: 169 lb (76.7 kg)   Height: 5' 4\" (1.626 m)      Body mass index is 29.01 kg/m². No Known Allergies  Prior to Visit Medications    Medication Sig Taking? Authorizing Provider   meloxicam (MOBIC) 7.5 MG tablet Take 1 tablet by mouth daily Yes DEEPTI Nunez CNP   famotidine (PEPCID) 20 MG tablet Take 1 tablet by mouth daily Yes DEEPTI Nunez CNP   omeprazole (PRILOSEC) 20 MG delayed release capsule Take 1 capsule by mouth Daily Yes DEEPTI Nunez CNP   sertraline (ZOLOFT) 50 MG tablet Take 1 tablet by mouth daily Yes DEEPTI Nunez CNP   amitriptyline (ELAVIL) 50 MG tablet TAKE ONE TABLET BY MOUTH ONCE NIGHTLY Yes DEEPTI Nunez CNP   diazePAM (VALIUM) 5 MG tablet Take 1 tablet by mouth every 8 hours as needed for Anxiety or Sleep (help with anxiety for MRI) for up to 1 dose.   Deysi Gómez MD   diclofenac (VOLTAREN) 75 MG EC tablet TAKE ONE TABLET BY MOUTH TWICE A DAY WITH MEALS  Deysi Gómez MD       [facility] (Including outside providers/suppliers regularly involved in providing care):   Patient Care Team:  DEEPTI Nunez CNP as PCP - General (Certified Nurse Practitioner)  DEEPTI Nunez CNP as PCP - [facility] [place] Provider  Gisel Nash MD as Consulting Physician (Gastroenterology)     Reviewed and updated this visit:  Allergies  Meds

## 2022-06-08 DIAGNOSIS — M25.511 RIGHT SHOULDER PAIN, UNSPECIFIED CHRONICITY: ICD-10-CM

## 2022-06-08 DIAGNOSIS — M75.81 TENDINITIS OF RIGHT ROTATOR CUFF: ICD-10-CM

## 2022-06-08 RX ORDER — DICLOFENAC SODIUM 75 MG/1
TABLET, DELAYED RELEASE ORAL
Qty: 28 TABLET | Refills: 0 | Status: SHIPPED | OUTPATIENT
Start: 2022-06-08

## 2022-06-13 ENCOUNTER — HOSPITAL ENCOUNTER (OUTPATIENT)
Dept: PHYSICAL THERAPY | Age: 57
Setting detail: THERAPIES SERIES
Discharge: HOME | End: 2022-06-13
Payer: MEDICARE

## 2022-06-13 ENCOUNTER — TELEPHONE (OUTPATIENT)
Dept: ORTHOPEDIC SURGERY | Age: 57
End: 2022-06-13

## 2022-06-13 DIAGNOSIS — M75.81 TENDINITIS OF RIGHT ROTATOR CUFF: Primary | ICD-10-CM

## 2022-06-13 DIAGNOSIS — M25.511 RIGHT SHOULDER PAIN, UNSPECIFIED CHRONICITY: ICD-10-CM

## 2022-06-13 NOTE — FLOWSHEET NOTE
[x] [facility]) - [facility] &  Therapy  [address]    [phone]  F: [phone]   [] [address]  [facility]   [address]  P: [phone]  F: [phone]  [] [address] &  Therapy  [address] [facility]  P: [phone]  F: [phone] [] [address]  P: [phone]  F: [phone]  [] [address]  [facility]   [address]   [place]: [phone]  F: [phone]   [] [place] [address] [facility] [address]  [place]: [phone]   F: [phone]     Physical Therapy Cancel/No Show note    Date: 2022  Patient: Violeta Starr  : 1965  MRN: [id number]    Cancels/No Shows to date:     For today's appointment patient:    [x]  Cancelled    [] Rescheduled appointment    [] No-show     Reason given by patient:    []  Patient ill    []  Conflicting appointment    [] No transportation      [] Conflict with work    [] No reason given    [] Weather related    [] COVID-19    [x] Other:      Comments:  Caregiver called and LVM stated she could not move arm.         [x] Next appointment was confirmed    Electronically signed by: Jill Vee PTA

## 2022-06-13 NOTE — TELEPHONE ENCOUNTER
Spoke with patients sister. Right shoulder MRI ordered and phone number to schedule provided to the patient. Patient has an appt scheduled for 6/20. Told sister that she can call to reschedule if MRI will not be completed prior to 6/20 appt.

## 2022-06-15 ENCOUNTER — HOSPITAL ENCOUNTER (OUTPATIENT)
Dept: PHYSICAL THERAPY | Age: 57
Setting detail: THERAPIES SERIES
Discharge: HOME | End: 2022-06-15
Payer: MEDICARE

## 2022-06-15 ENCOUNTER — TELEPHONE (OUTPATIENT)
Dept: ORTHOPEDIC SURGERY | Age: 57
End: 2022-06-15

## 2022-06-15 DIAGNOSIS — M75.81 TENDINITIS OF RIGHT ROTATOR CUFF: Primary | ICD-10-CM

## 2022-06-15 DIAGNOSIS — M25.511 RIGHT SHOULDER PAIN, UNSPECIFIED CHRONICITY: ICD-10-CM

## 2022-06-15 RX ORDER — DIAZEPAM 5 MG/1
5 TABLET ORAL EVERY 8 HOURS PRN
Qty: 1 TABLET | Refills: 0 | Status: SHIPPED | OUTPATIENT
Start: 2022-06-16 | End: 2022-06-30

## 2022-06-15 NOTE — FLOWSHEET NOTE
[x] [facility]) - [facility] &  Therapy  [address]    [phone]  F: [phone]   [] [address]  [facility]   [address]  P: [phone]  F: [phone]  [] [place]  [address]  P: [phone]  F: [phone] [] [address]  P: [phone]  F: [phone]  [] [address]  [facility]   [address]   [place]: [phone]  F: [phone]   [] [place] [address] [facility] [address]  [place]: [phone]   F: [phone]     Physical Therapy Cancel/No Show note    Date: 6/15/2022  Patient: Benita Christianson  : 1965  MRN: [id number]    Cancels/No Shows to date:     For today's appointment patient:    [x]  Cancelled    [] Rescheduled appointment    [] No-show     Reason given by patient:     []  Patient ill     []  Conflicting appointment     [] No transportation      [] Conflict with work      [] No reason given     [] Weather related     [] COVID-19    [x] Other:      Comments:  Caregiver called and LVM stated she would call back to reschedule.         [] Next appointment was confirmed    Electronically signed by: Francis Ariza PTA

## 2022-06-15 NOTE — PLAN OF CARE
Pt attempted the MRI Rt shoulder and experienced claustrophobia. Pt will need medication to relax her. Online  was used due to patient being deaf. Nani # [id number]. Please reschedule patient. Thank you.

## 2022-06-23 DIAGNOSIS — M25.511 RIGHT SHOULDER PAIN, UNSPECIFIED CHRONICITY: ICD-10-CM

## 2022-06-23 DIAGNOSIS — M75.81 TENDINITIS OF RIGHT ROTATOR CUFF: ICD-10-CM

## 2022-06-23 RX ORDER — DICLOFENAC SODIUM 75 MG/1
TABLET, DELAYED RELEASE ORAL
Qty: 28 TABLET | Refills: 0 | OUTPATIENT
Start: 2022-06-23

## 2022-06-23 NOTE — TELEPHONE ENCOUNTER
Spoke with sister and informed her that Dr. Adilson Alfaro filled the script on 5/18/22 and then refilled it on 6/8/22. At this point patient will need to contact PCP if she would like to continue voltaren to see if PCP will prescribe it. PCP will then be able to monitor kidneys with long term NSAID use.

## 2022-06-24 ENCOUNTER — HOSPITAL ENCOUNTER (OUTPATIENT)
Dept: MRI IMAGING | Age: 57
Discharge: HOME | End: 2022-06-17
Payer: MEDICARE

## 2022-06-24 DIAGNOSIS — M75.81 TENDINITIS OF RIGHT ROTATOR CUFF: ICD-10-CM

## 2022-06-24 DIAGNOSIS — M25.511 RIGHT SHOULDER PAIN, UNSPECIFIED CHRONICITY: ICD-10-CM

## 2022-06-24 PROCEDURE — 73221 MRI JOINT UPR EXTREM W/O DYE: CPT

## 2022-06-27 ENCOUNTER — OFFICE VISIT (OUTPATIENT)
Dept: ORTHOPEDIC SURGERY | Age: 57
End: 2022-06-27
Payer: MEDICARE

## 2022-06-27 VITALS — HEIGHT: 64 IN | WEIGHT: 175 LBS | BODY MASS INDEX: 29.88 KG/M2

## 2022-06-27 DIAGNOSIS — M75.111 NONTRAUMATIC INCOMPLETE TEAR OF RIGHT ROTATOR CUFF: Primary | ICD-10-CM

## 2022-06-27 PROCEDURE — 20610 DRAIN/INJ JOINT/BURSA W/O US: CPT | Performed by: ORTHOPAEDIC SURGERY

## 2022-06-27 RX ORDER — LIDOCAINE HYDROCHLORIDE 10 MG/ML
3 INJECTION, SOLUTION INFILTRATION; PERINEURAL ONCE
Status: COMPLETED | OUTPATIENT
Start: 2022-06-27 | End: 2022-06-27

## 2022-06-27 RX ORDER — TRIAMCINOLONE ACETONIDE 40 MG/ML
40 INJECTION, SUSPENSION INTRA-ARTICULAR; INTRAMUSCULAR ONCE
Status: COMPLETED | OUTPATIENT
Start: 2022-06-27 | End: 2022-06-27

## 2022-06-27 RX ADMIN — TRIAMCINOLONE ACETONIDE 40 MG: 40 INJECTION, SUSPENSION INTRA-ARTICULAR; INTRAMUSCULAR at 15:19

## 2022-06-27 RX ADMIN — LIDOCAINE HYDROCHLORIDE 3 ML: 10 INJECTION, SOLUTION INFILTRATION; PERINEURAL at 15:19

## 2022-07-01 ENCOUNTER — HOSPITAL ENCOUNTER (OUTPATIENT)
Dept: PHYSICAL THERAPY | Age: 57
Setting detail: THERAPIES SERIES
Discharge: HOME | End: 2022-07-01
Payer: MEDICARE

## 2022-07-01 PROCEDURE — 97110 THERAPEUTIC EXERCISES: CPT

## 2022-07-01 PROCEDURE — 97140 MANUAL THERAPY 1/> REGIONS: CPT

## 2022-07-01 NOTE — FLOWSHEET NOTE
[x] [facility] &  Therapy  [address]  [phone]  F: [phone] [] [address]  [facility]   [address]  P: [phone]  F: [phone] [] [facility] &  Therapy  [address]  P: [phone]  F: [phone] [] [address]  P: [phone]  F: [phone] [] [address]  [facility]   [address]   [place]: [phone]  F: [phone]      Physical Therapy Daily Treatment Note    Date:  2022  Patient Name:  Kirk Romero    :  1965  MRN: [id number]  Physician: Abigail Caicedo MD                                   Insurance: Medicare, [address] [place] [address], Medicaid  Medical Diagnosis: Right shoulder pain, tendinitis of right rotator cuff                     Rehab Codes: M 25.511, M 25.611, M 54.2, M 62.81, R 20.2, R 29.3  Onset Date: 22                 Next 's appt: Not scheduled  Visit# / total visits: 3/12; Progress note for Medicare patient due at visit 10     Cancels/No Shows: 0/0    Subjective:    Pain:  [x] Yes  [] No Location: Right shoulder   Pain Rating: (0-10 scale) Unable to state, some pain/10  Pain altered Tx:  [x] No  [] Yes  Action:  Comments: In person  per patient request.  Michael Salcido with patient. States she has her HEP. Unsure if she is actually doing exs, as patient does not report consistent answers for answering. Objective:  Modalities: Refused need 22   Precautions:  IPad interpretor for ASL. Per sister, on the autism spectrum. From Dr. Simmons Smaller: Work on Right shoulder ROM. RTC/Scapular stabilizer strengthening. Biceps strengthening. Also work on stretching/strengthening Cervical spine. Evaluate and treat. Modalities as needed.  Teach home exercise program. 2-3 times a week for 4-6 weeks. Exercises:  Exercise Reps/ Time Weight/ Level Completed 22  Comments   SCIFIT 5 min L 2.0  Change to UBE next visit. UBE 4 min L 2.0 x    Corner stretch 3 x 10 sec x Reported some discomfort after all 3; instructed to stretch less next Rx   Reverse wall push ups 10 x  x    Mini wall push up on medium green ball with short hold 10 x  x Mini push up, slight hold. Wall push up 10 x  x    Ball flexion on wall 10 x  x Lime green medium ball   Small circles on wall 10 x ea  x CW, CCW; lime green medium ball. Horizontal abduction 10 x Lime x Fatiguing    B ER 10 x Lime x    Rebounder tosses 20 x 1 kg x           Biceps curls 20 x 3 lb x    Rows 20 x Lime  x    Extension 20 x Lime x           Upper trap stretch with arm behind back 3 x ea 10 sec x Some N/T right arm with stretch. HEP          Manual therapy in supine 12 min  x PROM to right shoulder, PCROM, trigger point releases. Posture 2 min   Education. HEP to stretch 10 x per hour, holding 5 seconds; repeat 10 x throughout the day. Prone on total gym       - extension 10 x 1 lb x    - horizontal abduction 10 x 1 lb x    - rows 10 x  1 lb x                           Other:      Treatment Charges: Mins Units   []  Modalities     [x]  Ther Exercise 34 2   [x]  Manual Therapy 12 1   []  Ther Activities     []  Aquatics     []  Vasocompression     []  Other     Total Treatment time 46        Assessment: [x] Progressing toward goals. Increases as noted - patient tolerated progressions without incident - but did have one twinge that caused her left shoulder to be painful. [] No change. [x] Other: Per , Thomasina Apgar, patient has a lot of \"home\" signs, which does make communication difficult.     [x] Patient would continue to benefit from skilled physical therapy services in order to: improved posture, improved ROM, improved strength, decreased tingling/numbness in arm, decreased pain in right shoulder and down right arm, decrease cervical spine pain, improve headaches. STG: (to be met in 6 treatments)  1. ? Pain: Right shoulder pain improve to 5/10 with active movements  2. ? ROM: Seated right shoulder abduction improve to 130 degrees without pain. 3. Supine right shoulder abduction improve to 140 degrees without pain. 4. ? Strength: Right shoulder strength improve to 4/5 without pain  5. ? Function: Patient to report chores around her home have become easier  6. Patient to be independent with home exercise program as demonstrated by performance with correct form without cues.     LTG: (to be met in 12 treatments)  1. Right shoulder pain improve to 2/10 at max with heavy lifting  2. Supine right shoulder ER improve to 90 degrees, IR to 90 degrees. 3. Patient able to complete all ADLs and IADLs without significant pain  4. Patient to sit with more upright posture. 5. UEFI score improve to lacking 35% or less. 6. Patient to report resolution of pain radiating in to right arm  7. Cervical ROM improve to: flexion 50 degrees, extension 45 degrees, left rotation 80 degrees, left side bend 30 degrees. Patient goals: (none stated)    Pt. Education:  [x] Yes  [] No  [x] Reviewed Prior HEP/Ed  Method of Education: [] Verbal  [x] Demo  [] Written  -- requires demo of all exs; after demo patient tends to recall the exs from previous. Comprehension of Education:  [] Verbalizes understanding. [] Demonstrates understanding. [x] Needs review. [] Demonstrates/verbalizes HEP/Ed previously given. Plan: [x] Continue current frequency toward long and short term goals. [x] Specific Instructions for subsequent treatments: Continue manual therapy to cervical spine and right shoulder. Can add traction. UBE, pulleys, table slides, cane exercises. Progress to weighted exercises. Supine over half roll to stretch chest.  Posture exercises. Can use modalities as needed for pain control.   Once signed, can use ionto to right shoulder. (Same from previous eval).       Time In:1302            Time Out: 4302    Electronically signed by:  Kathleen Lawler PT

## 2022-07-01 NOTE — PROGRESS NOTES
HPI: Ms. Clare Salinas is a 49-year-old with right shoulder pain. She was seen in my clinic about a month ago at which time she was diagnosis right shoulder rotator cuff tendinitis. Treatment was initiated with a prescription anti-inflammatory as well as physical therapy. Unfortunately she has persistent pain in spite of this and so returns today for further evaluation and treatment after obtaining an MRI study of her right shoulder. Imaging studies: An MRI of the right shoulder completed on 6/24/2022 was viewed independently demonstrating what appears to be a high-grade bursal sided partial-thickness tear of the supraspinatus. Biceps tendon appears to be intact. Impression and plan: Ms. Clare Salinas is a 49-year-old with persistent right shoulder pain and dysfunction with high-grade bursal sided partial-thickness tear of her rotator cuff as outlined above. I had a discussion with the patient today with the aid of an  due to the fact that she is hard of hearing. I educated her about this condition and discussed treatment options. As outlined above she is undergone treatment with physical therapy and a short course of a prescription anti-inflammatory. We discussed continuing on with conservative management versus moving ahead with surgery by way of an arthroscopic rotator cuff repair. We discussed some of the details of surgery. At this time she is inclined to try and continue to pursue conservative management. Following a discussion she would like to proceed with a cortisone injection and another round of physical therapy since the prescription of Voltaren was ineffective. Consequently she received injection as outlined below and a prescription for physical therapy was again provided. I will see her back in my clinic as needed but she may return or call at anytime with persistent or worsening symptoms and with any questions or concerns.     Procedure: right shoulder subacromial space injection  Following an appropriate discussion with the patient regarding the risks and benefits of the procedure she consented to proceed. her right shoulder was prepped using chlorhexadine solution. Using aseptic technique and through a posterior approach, her right shoulder subacromial space was injected with a 4 cc mixture of 1cc 40mg/ml kenalog and 3 cc of 1% lidocaine without epinephrine. A band aid was applied to the injection site. she tolerated the injection with no immediate adverse reactions.

## 2022-07-06 ENCOUNTER — HOSPITAL ENCOUNTER (OUTPATIENT)
Dept: PHYSICAL THERAPY | Age: 57
Setting detail: THERAPIES SERIES
Discharge: HOME | End: 2022-07-06
Payer: MEDICARE

## 2022-07-06 PROCEDURE — 97110 THERAPEUTIC EXERCISES: CPT

## 2022-07-06 PROCEDURE — 97140 MANUAL THERAPY 1/> REGIONS: CPT

## 2022-07-06 NOTE — FLOWSHEET NOTE
[x] [facility] TWELVESTEP [facility] CENTER &  Therapy  [address]  [phone]  F: [phone] [] [address]  [facility]   [address]  P: [phone]  F: [phone] [] [address]  [address]  P: [phone]  F: [phone] [] [address]  P: [phone]  F: [phone] [] [address]  [facility]   [address]   [place]: [phone]  F: [phone]      Physical Therapy Daily Treatment Note    Date:  2022  Patient Name:  Nani Norton    :  1965  MRN: [id number]  Physician: Diego Lees MD                                   Insurance: Medicare, [address] [place] [address], Medicaid  Medical Diagnosis: Right shoulder pain, tendinitis of right rotator cuff                     Rehab Codes: M 25.511, M 25.611, M 54.2, M 62.81, R 20.2, R 29.3  Onset Date: 22                 Next 's appt: Not scheduled  Visit# / total visits: ; Progress note for Medicare patient due at visit 10     Cancels/No Shows: 0/0    Subjective:    Pain:  [x] Yes  [] No Location: Right shoulder   Pain Rating: (0-10 scale) 2/10  Pain altered Tx:  [x] No  [] Yes  Action:  Comments: In person  per patient request.  Mara Perez with patient. Patient states she has pain in the R shoulder. The pain in the back of the shoulder is much better. Objective:  Modalities: Refused need 22   Precautions:  IPad interpretor for ASL. Per sister, on the autism spectrum. From Dr. Jo Mail: Work on Right shoulder ROM. RTC/Scapular stabilizer strengthening. Biceps strengthening. Also work on stretching/strengthening Cervical spine. Evaluate and treat. Modalities as needed.  Teach home exercise program.   2-3 times a week for 4-6 weeks.  Exercises:  Exercise Reps/ Time Weight/ Level Completed 07/06/22  Comments   SCIFIT 5 min L 2.0  Change to UBE next visit. UBE 4 min L 2.0 x    Corner stretch 3 x 10 sec x VC for stretching with no pain   Reverse wall push ups 10 x  x    Mini wall push up on medium green ball with short hold 10 x  x Mini push up, slight hold. Wall push up 10 x  x    Ball flexion on wall 10 x  x Lime green medium ball   Small circles on wall 10 x ea  x CW, CCW; lime green medium ball. Horizontal abduction 10 x Lime x Fatiguing    B ER 10 x Lime x    Rebounder tosses 20 x 1 kg  Omitted in error          Biceps curls 20 x 3 lb x    Rows 20 x blue x    Triceps 20 x blue x Added 7.6   Extension 20 x blue x           Upper trap stretch with arm behind back 3 x ea 10 sec  Some N/T right arm with stretch. HEP          Manual therapy in supine 10 min  x PROM to right shoulder, PCROM, trigger point releases. Posture 2 min   Education. HEP to stretch 10 x per hour, holding 5 seconds; repeat 10 x throughout the day. Prone on total gym       - extension 10 x 1 lb x    - horizontal abduction 10 x 1 lb x    - rows 10 x  1 lb x    -flexion 10 x 1 lb x                    Other:      Treatment Charges: Mins Units   []  Modalities     [x]  Ther Exercise 49 3   [x]  Manual Therapy 8 1   []  Ther Activities     []  Aquatics     []  Vasocompression     []  Other     Total Treatment time 57 4       Assessment: [x] Progressing toward goals. Good recall of exercises overall. Increased resistance with thera bands with no increase in pain. Shoulder flexion/abd added in standing for UE strength with ROM WNL. Ended with PROM with slight pain noted at end ranges in flexion and ER. Declined pain modalities. Shoulder feels better after the stretching and exercises. [] No change. [x] Other: Patient will be on vacation all next week. R/s for the following week.       [x] Patient would continue to benefit from skilled physical therapy services in order to: improved posture, improved ROM, improved strength, decreased tingling/numbness in arm, decreased pain in right shoulder and down right arm, decrease cervical spine pain, improve headaches. STG: (to be met in 6 treatments)  1. ? Pain: Right shoulder pain improve to 5/10 with active movements  2. ? ROM: Seated right shoulder abduction improve to 130 degrees without pain. 3. Supine right shoulder abduction improve to 140 degrees without pain. 4. ? Strength: Right shoulder strength improve to 4/5 without pain  5. ? Function: Patient to report chores around her home have become easier  6. Patient to be independent with home exercise program as demonstrated by performance with correct form without cues.     LTG: (to be met in 12 treatments)  1. Right shoulder pain improve to 2/10 at max with heavy lifting  2. Supine right shoulder ER improve to 90 degrees, IR to 90 degrees. 3. Patient able to complete all ADLs and IADLs without significant pain  4. Patient to sit with more upright posture. 5. UEFI score improve to lacking 35% or less. 6. Patient to report resolution of pain radiating in to right arm  7. Cervical ROM improve to: flexion 50 degrees, extension 45 degrees, left rotation 80 degrees, left side bend 30 degrees. Patient goals: (none stated)    Pt. Education:  [x] Yes  [] No  [x] Reviewed Prior HEP/Ed  Method of Education: [] Verbal  [x] Demo  [] Written  -- requires demo of all exs; after demo patient tends to recall the exs from previous. Comprehension of Education:  [] Verbalizes understanding. [x] Demonstrates understanding. [x] Needs review. [x] Demonstrates/verbalizes HEP/Ed previously given. Plan: [x] Continue current frequency toward long and short term goals. [x] Specific Instructions for subsequent treatments: Continue manual therapy to cervical spine and right shoulder. Can add traction.   UBE, pulleys, table slides, cane exercises. Progress to weighted exercises. Supine over half roll to stretch chest.  Posture exercises. Can use modalities as needed for pain control. Once signed, can use ionto to right shoulder. (Same from previous eval).       Time In: 1500            Time Out: 7834    Electronically signed by:  Tiarra Miranda PTA

## 2022-07-12 ENCOUNTER — APPOINTMENT (OUTPATIENT)
Dept: PHYSICAL THERAPY | Age: 57
End: 2022-07-12
Payer: MEDICARE

## 2022-07-14 ENCOUNTER — APPOINTMENT (OUTPATIENT)
Dept: PHYSICAL THERAPY | Age: 57
End: 2022-07-14
Payer: MEDICARE

## 2022-07-19 ENCOUNTER — HOSPITAL ENCOUNTER (OUTPATIENT)
Dept: PHYSICAL THERAPY | Age: 57
Setting detail: THERAPIES SERIES
Discharge: HOME | End: 2022-07-19
Payer: MEDICARE

## 2022-07-19 PROCEDURE — 97140 MANUAL THERAPY 1/> REGIONS: CPT

## 2022-07-19 PROCEDURE — 97110 THERAPEUTIC EXERCISES: CPT

## 2022-07-19 NOTE — FLOWSHEET NOTE
[x] [facility] TWELVESTEP [facility] CENTER &  Therapy  [address]  [phone]  F: [phone] [] [address]  [facility]   [address]  P: [phone]  F: [phone] [] [address]  [address]  P: [phone]  F: [phone] [] [address]  P: [phone]  F: [phone] [] [address]  [facility]   [address]   [place]: [phone]  F: [phone]      Physical Therapy Daily Treatment Note    Date:  2022  Patient Name:  Jonah Bridges    :  1965  MRN: [id number]  Physician: Soco Barnard MD                                   Insurance: Medicare, [address] [place] [address], Medicaid  Medical Diagnosis: Right shoulder pain, tendinitis of right rotator cuff                     Rehab Codes: M 25.511, M 25.611, M 54.2, M 62.81, R 20.2, R 29.3  Onset Date: 22                 Next 's appt: Not scheduled  Visit# / total visits: ; Progress note for Medicare patient due at visit 10     Cancels/No Shows: 0/0    Subjective:    Pain:  [] Yes  [x] No Location: Right shoulder   Pain Rating: (0-10 scale) 0/10  Pain altered Tx:  [x] No  [] Yes  Action:  Comments:  Patient reports no pain today. Just came back from vacation. No issues while gone. Objective:  Modalities: Refused need 22   Precautions:  Mariano Garcia from [facility] ([address] [facility]). Per sister, on the autism spectrum. From Dr. Ari Chowdhury: Work on Right shoulder ROM. RTC/Scapular stabilizer strengthening. Biceps strengthening. Also work on stretching/strengthening Cervical spine. Evaluate and treat. Modalities as needed. Teach home exercise program.   2-3 times a week for 4-6 weeks.   Exercises:  Exercise Reps/ Time Weight/ Level Completed 22 Comments   SCIFIT 5 min L 2.0  Change to UBE next visit. UBE 4 min L 2.0 x    Corner stretch 3 x 10 sec x VC for stretching with no pain   Reverse wall push ups 10 x 2  x    Mini wall push up on medium green ball with short hold 10 x 2  x Mini push up, slight hold. Wall push up 10 x       Ball flexion on wall 10 x 2  x Lime green medium ball   Small circles on wall 20 x ea  x CW, CCW; lime green medium ball. Horizontal abduction 10 x Lime  Fatiguing    B ER 10 x Lime     Rebounder tosses 20 x 2 1 kg x Forward and IR          Biceps curls 20 x 3 lb x    Rows 20 x blue x    Triceps 20 x blue x Added 7.6   Extension 20 x blue x           Cane ext 20x  x    Cane IR 20x  x                  Upper trap stretch with arm behind back 3 x ea 10 sec  Some N/T right arm with stretch. HEP          Manual  8 min  x MFR to B UT, R>L x4 min  HyperVolt x4min          Posture 2 min   Education. HEP to stretch 10 x per hour, holding 5 seconds; repeat 10 x throughout the day. Posterior shoulder rolls 10x  AA AA for technique  Added 7.19          Prone on total gym       - extension 15 x 1 lb x    - horizontal abduction 15 x 1 lb x    - rows 15 x  1 lb x    -flexion 10 x 1 lb x                    Other:      Treatment Charges: Mins Units   []  Modalities     [x]  Ther Exercise 52 3   [x]  Manual Therapy 8 1   []  Ther Activities     []  Aquatics     []  Vasocompression      []  Other     Total Treatment time 60 4       Assessment: [x] Progressing toward goals. Patient completes exercise log above with in person . Increased reps with wall push ups/reverse push ups and stability ball movements on wall. Tactile cueing with HAB for rhomboid activation, shoulders elevate with fatigue. Ended with MFR to decrease trigger points, improved tolerance of hypervolt following layer 3 MFR. [] No change.      [x] Other:   Informed patient that I have now contacted language services 2x and they have not confirmed an for subsequent treatments: Continue manual therapy to cervical spine and right shoulder. Can add traction. UBE, pulleys, table slides, cane exercises. Progress to weighted exercises. Supine over half roll to stretch chest.  Posture exercises. Can use modalities as needed for pain control. Once signed, can use ionto to right shoulder. (Same from previous eval).       Time In: 1400            Time Out: 1500    Electronically signed by:  Glory Baker PTA

## 2022-07-21 ENCOUNTER — HOSPITAL ENCOUNTER (OUTPATIENT)
Dept: PHYSICAL THERAPY | Age: 57
Setting detail: THERAPIES SERIES
Discharge: HOME | End: 2022-07-21
Payer: MEDICARE

## 2022-07-21 PROCEDURE — 97140 MANUAL THERAPY 1/> REGIONS: CPT

## 2022-07-21 PROCEDURE — 97110 THERAPEUTIC EXERCISES: CPT

## 2022-07-21 NOTE — FLOWSHEET NOTE
[x] [facility] TWELVESTEP [facility] CENTER &  Therapy  [address]  [phone]  F: [phone] [] [address]  [place] [address]   [address]  P: [phone]  F: [phone] [] [address]  [address]  P: [phone]  F: [phone] [] [address]  P: [phone]  F: [phone] [] [address]  [facility]   [address]   [place]: [phone]  F: [phone]      Physical Therapy Daily Treatment Note    Date:  2022  Patient Name:  Chris Conley    :  1965  MRN: [id number]  Physician: Berenice Keyes MD                                   Insurance: Medicare, [address] [place] [address], Medicaid  Medical Diagnosis: Right shoulder pain, tendinitis of right rotator cuff                     Rehab Codes: M 25.511, M 25.611, M 54.2, M 62.81, R 20.2, R 29.3  Onset Date: 22                 Next 's appt: Not scheduled  Visit# / total visits: ; Progress note for Medicare patient due at visit 10     Cancels/No Shows: 0/0    Subjective:    Pain:  [] Yes  [x] No Location: Right shoulder   Pain Rating: (0-10 scale) 0/10  Pain altered Tx:  [x] No  [] Yes  Action:  Comments:  Patient arrives stating she is feeling good. No pain. Objective:  Modalities: Refused need 22   Precautions:  Alden Human from [facility] ([facility]). Per sister, on the autism spectrum. From Dr. Roxi Layton: Work on Right shoulder ROM. RTC/Scapular stabilizer strengthening. Biceps strengthening. Also work on stretching/strengthening Cervical spine. Evaluate and treat. Modalities as needed. Teach home exercise program.   2-3 times a week for 4-6 weeks.   Exercises:  Exercise Reps/ Time Weight/ Level Completed 22  Comments   SCIFIT 5 min L 2.0 Change to UBE next visit. UBE 4 min L 2.0 x    Corner stretch 3 x 10 sec x VC for stretching with no pain   Reverse wall push ups 10 x 2  x    Mini wall push up on medium green ball with short hold 10 x 2  x Mini push up, slight hold. Wall push up 10 x 2  x Window sill 7.21   Pertubations of ball 2x30\"  x Added 7.21   Ball flexion on wall 10 x 2  x Lime green medium ball   Small circles on wall 20 x ea  x CW, CCW; lime green medium ball. Horizontal abduction 10 x Lime  Fatiguing    B ER 10 x Lime     Rebounder tosses 20 x 2 1 kg x Forward and IR                        Shoulder flexion 10x2 1 lbs x    Shoulder abd 10x2 1 lbs x    Shoulder scaption ADD      Biceps curls 20 x 3 lb x           Thera bands       Rows 20 x purple x    Triceps 20 x purple x    Extension 20 x purple x           Cane ext 20x 2 lbs x    Cane IR 20x 2 lbs x                  Upper trap stretch with arm behind back 3 x ea 10 sec  Some N/T right arm with stretch. HEP          Manual  10 min  x MFR to B UT, R>L x4 min  HyperVolt x4min          Posture 2 min   Education. HEP to stretch 10 x per hour, holding 5 seconds; repeat 10 x throughout the day. Posterior shoulder rolls 10x  AA AA for technique  Added 7.19          Prone on total gym       - extension 15 x 1 lb     - horizontal abduction 15 x 1 lb     - rows 15 x  1 lb     -flexion 10 x 1 lb                     Other:      Treatment Charges: Mins Units   []  Modalities     [x]  Ther Exercise 48 3   [x]  Manual Therapy 10 1   []  Ther Activities     []  Aquatics     []  Vasocompression     []  Other     Total Treatment time 58 4       Assessment: [x] Progressing toward goals. Progressed push up on window sill and added pertubation's with ball to increase scapular stability. Shoulder flexion/abd added in standing with light weight to increase strength with no report of pain or discomfort. Resistive IR/ER shoulder thera bands added, requiring quick rest break between sets.   Ended with manual MFR to address trigger points. Reviewed HEP with good carry over of exercises. Patient took picture of QR code as well as printed copy. [] No change. [] Other:     [x] Patient would continue to benefit from skilled physical therapy services in order to: improved posture, improved ROM, improved strength, decreased tingling/numbness in arm, decreased pain in right shoulder and down right arm, decrease cervical spine pain, improve headaches. STG: (to be met in 6 treatments)  ? Pain: Right shoulder pain improve to 5/10 with active movements  ? ROM: Seated right shoulder abduction improve to 130 degrees without pain. Supine right shoulder abduction improve to 140 degrees without pain. ? Strength: Right shoulder strength improve to 4/5 without pain  ? Function: Patient to report chores around her home have become easier  Patient to be independent with home exercise program as demonstrated by performance with correct form without cues. LTG: (to be met in 12 treatments)  Right shoulder pain improve to 2/10 at max with heavy lifting  Supine right shoulder ER improve to 90 degrees, IR to 90 degrees. Patient able to complete all ADLs and IADLs without significant pain  Patient to sit with more upright posture. UEFI score improve to lacking 35% or less. Patient to report resolution of pain radiating in to right arm  Cervical ROM improve to: flexion 50 degrees, extension 45 degrees, left rotation 80 degrees, left side bend 30 degrees. Patient goals: (none stated)    Pt. Education:  [x] Yes  [] No  [] Reviewed Prior HEP/Ed  Method of Education: [x] Verbal  [x] Demo  [x] Written  --  7.21.22 HEP printed and pt took picture of QR code on her phone for the videos. Comprehension of Education:  [] Verbalizes understanding. [x] Demonstrates understanding. [x] Needs review. [x] Demonstrates/verbalizes HEP/Ed previously given.     Access Code: [email or web address]. [employer]/Date: 07/21/2022Prepared by: [address] with [employer] - 1 x daily - 7 x weekly - 3 sets - 10 reps   TB ER - 1 x daily - 7 x weekly - 3 sets - 10 reps   TB IR - 1 x daily - 7 x weekly - 3 sets - 10 reps   TB Shoulder extension - 1 x daily - 7 x weekly - 3 sets - 10 reps   TB Rows - 1 x daily - 7 x weekly - 3 sets - 10 reps   HAB - 1 x daily - 7 x weekly - 3 sets - 10 reps   Standing Single Arm Shoulder External Rotation in Abduction with Anchored Resistance - 1 x daily - 7 x weekly - 3 sets - 10 reps   Reverse push ups - 1 x daily - 7 x weekly - 3 sets - 10 reps   Wall Push Up - 1 x daily - 7 x weekly - 3 sets - 10 reps     Plan: [x] Continue current frequency toward long and short term goals.     [x] Specific Instructions for subsequent treatments:  Supine over half roll to stretch chest.        Time In: 1405            Time Out: 1503    Electronically signed by:  Sofya Dan PTA

## 2022-07-26 ENCOUNTER — HOSPITAL ENCOUNTER (OUTPATIENT)
Dept: PHYSICAL THERAPY | Age: 57
Setting detail: THERAPIES SERIES
Discharge: HOME | End: 2022-07-26
Payer: MEDICARE

## 2022-07-26 PROCEDURE — 97110 THERAPEUTIC EXERCISES: CPT

## 2022-07-26 PROCEDURE — 97140 MANUAL THERAPY 1/> REGIONS: CPT

## 2022-07-29 ENCOUNTER — HOSPITAL ENCOUNTER (OUTPATIENT)
Dept: PHYSICAL THERAPY | Age: 57
Setting detail: THERAPIES SERIES
Discharge: HOME | End: 2022-07-29
Payer: MEDICARE

## 2022-07-29 PROCEDURE — 97110 THERAPEUTIC EXERCISES: CPT

## 2022-07-29 NOTE — FLOWSHEET NOTE
shoulder ROM. RTC/Scapular stabilizer strengthening. Biceps strengthening. Also work on stretching/strengthening Cervical spine. Evaluate and treat. Modalities as needed. Teach home exercise program.   2-3 times a week for 4-6 weeks. Exercises:  Exercise Reps/ Time Weight/ Level Completed 07/29/22  Comments   SCIFIT 5 min L 2.0  Change to UBE next visit. UBE 4 min L 3.0 x    Corner stretch 3 x 10 sec x VC for stretching with no pain   Reverse wall push ups 20 x  x    Mini wall push up on medium green ball with short hold 20 x  x Mini push up, slight hold. Wall push up 20 x  x Window sill 7.21   Pertubations of ball 2x30\" ea 1.1 lb x Flexion/ext  Medial/lateral   Ball flexion on wall 15 x  x Lime green medium ball   Small circles on wall 15 x ea  x CW, CCW; lime green medium ball. Horizontal abduction 10 x Lime x    B ER 10 x Lime x    Rebounder tosses 20 x 4 lb (lime small weighted ball)  Forward (held IR 7/26/22)                        Shoulder flexion 15 x 2 lbs x Cane, HEP 7/29/22   Shoulder abd 15 x 2 lb x Cane, HEP 7/29/22   Shoulder scaption 15 x AROM x HEP 7/29/22   Biceps curls 20 x 3 lb x HEP 7/29/22          Thera bands       Rows 20 x purple x    Triceps 20 x purple x HEP 7/29/22   Extension 20 x purple x    Lat pull down 10 x Purple x HEP 7/29/22          Cane ext 20x 2 lbs x HEP 7/29/22   Cane IR 20x 2 lbs x HEP 7/29/22                  Upper trap stretch with arm behind back 3 x ea 10 sec  Some N/T right arm with stretch. HEP          Manual  4 minutes  x Hypervolt along rhomboids, teres, upper traps          Posture 2 min   Education. HEP to stretch 10 x per hour, holding 5 seconds; repeat 10 x throughout the day.    Posterior shoulder rolls 10x   AA for technique  Added 7.19          Prone on total gym       - extension 15 x 3 lb x HEP 7/29/22   - horizontal abduction 15 x 3 lb x HEP 7/29/22   - rows 15 x  3 lb x HEP 7/29/22   -flexion 10 x 3 lb                     Other:      Treatment Charges: Mins Units   []  Modalities     [x]  Ther Exercise 35 3   [x]  Manual Therapy 4 0   []  Ther Activities     []  Aquatics     []  Vasocompression     []  Other     Total Treatment time 39        Assessment: [x] Progressing toward goals. Extension and abduction with cane caused pain. Added weighted ball to perturbations - patient found this difficult and painful. Able to continue exs as noted above. Issued HEP this date, as noted on flow sheet. Patient familiar with exs and to continue with all exs next week while off from therapy. Tactile and demo cues for activation of scapular retractors during exs. Patient noted to demo fatigue during prone exs. [] No change. [] Other:     [x] Patient would continue to benefit from skilled physical therapy services in order to: improved posture, improved ROM, improved strength, decreased tingling/numbness in arm, decreased pain in right shoulder and down right arm, decrease cervical spine pain, improve headaches. STG: (to be met in 6 treatments)  ? Pain: Right shoulder pain improve to 5/10 with active movements  ? ROM: Seated right shoulder abduction improve to 130 degrees without pain. Supine right shoulder abduction improve to 140 degrees without pain. ? Strength: Right shoulder strength improve to 4/5 without pain  ? Function: Patient to report chores around her home have become easier  Patient to be independent with home exercise program as demonstrated by performance with correct form without cues. LTG: (to be met in 12 treatments)  Right shoulder pain improve to 2/10 at max with heavy lifting  Supine right shoulder ER improve to 90 degrees, IR to 90 degrees. Patient able to complete all ADLs and IADLs without significant pain  Patient to sit with more upright posture. UEFI score improve to lacking 35% or less.   Patient to report resolution of pain radiating in to right arm  Cervical ROM improve to: flexion 50 degrees, extension 45 degrees, left rotation 80 degrees, left side bend 30 degrees. Patient goals: (none stated)    Pt. Education:  [x] Yes  [] No  [] Reviewed Prior HEP/Ed  Method of Education: [x] Verbal  [x] Demo  [x] Written  --  cane (flex, abd, ext, IR), resistance band triceps ext, lat pull down. AROM shoulder scaption, biceps curls. Prone (ext, horizontal abduction, rows). Comprehension of Education:  [] Verbalizes understanding. [x] Demonstrates understanding. [x] Needs review. [x] Demonstrates/verbalizes HEP/Ed previously given. Access Code: Karlie [email or web address]: [email or web address] com/Date: 07/21/2022Prepared by: [address] with [employer] - 1 x daily - 7 x weekly - 3 sets - 10 reps   TB ER - 1 x daily - 7 x weekly - 3 sets - 10 reps   TB IR - 1 x daily - 7 x weekly - 3 sets - 10 reps   TB Shoulder extension - 1 x daily - 7 x weekly - 3 sets - 10 reps   TB Rows - 1 x daily - 7 x weekly - 3 sets - 10 reps   HAB - 1 x daily - 7 x weekly - 3 sets - 10 reps   Standing Single Arm Shoulder External Rotation in Abduction with Anchored Resistance - 1 x daily - 7 x weekly - 3 sets - 10 reps   Reverse push ups - 1 x daily - 7 x weekly - 3 sets - 10 reps   Wall Push Up - 1 x daily - 7 x weekly - 3 sets - 10 reps     Plan: [x] Continue current frequency toward long and short term goals.     [x] Specific Instructions for subsequent treatments:  Supine over half roll to stretch chest.        Time In: 1203         Time Out: 1253    Electronically signed by:  Aamir Farnsworth, PT

## 2022-08-08 ENCOUNTER — HOSPITAL ENCOUNTER (OUTPATIENT)
Dept: PHYSICAL THERAPY | Age: 57
Setting detail: THERAPIES SERIES
Discharge: HOME | End: 2022-08-08
Payer: MEDICARE

## 2022-08-08 PROCEDURE — 97110 THERAPEUTIC EXERCISES: CPT

## 2022-08-08 NOTE — FLOWSHEET NOTE
[x] [facility] CENTER &  Therapy  [address]  [phone]  F: [phone] [] [address]  [place] [address]   [address]  P: [phone]  F: [phone] [] [address]  [address]  P: [phone]  F: [phone] [] [address]  P: [phone]  F: [phone] [] [address]  [facility]   [address]   [place]: [phone]  F: [phone]      Physical Therapy Daily Treatment Note    Date:  2022  Patient Name:  Sarah Lujan    :  1965  MRN: [id number]  Physician: Edda Valencia MD                                   Insurance: Medicare, [address] [place] [address], Medicaid  Medical Diagnosis: Right shoulder pain, tendinitis of right rotator cuff                     Rehab Codes: M 25.511, M 25.611, M 54.2, M 62.81, R 20.2, R 29.3  Onset Date: 22                 Next 's appt: Not scheduled  Visit# / total visits: ; Progress note for Medicare patient due at visit 10     Cancels/No Shows: 0/0    Subjective:    Pain:  [x] Yes  [] No Location: Right shoulder   Pain Rating: (0-10 scale) 2/10  Pain altered Tx:  [x] No  [] Yes  Action:  Comments:   arrived 2 min late due to in hospital directions. Patient reports shoulder is ok. No problems over the weekend. Objective:  Modalities:  Precautions:  Nicole De León present for interpretation. Per sister, on the autism spectrum. From Dr. Gomez Cook: Work on Right shoulder ROM. RTC/Scapular stabilizer strengthening. Biceps strengthening. Also work on stretching/strengthening Cervical spine. Evaluate and treat. Modalities as needed. Teach home exercise program.   2-3 times a week for 4-6 weeks.   Exercises:  Exercise Reps/ Time Weight/ Level Completed 22 Comments   SCIFIT 5 min L 2.0  Change to UBE next visit. UBE 4 min L 4.0 x Inc resistance 8.8   Corner stretch 3 x 10 sec x VC for stretching with no pain   Reverse wall push ups 20 x      Mini wall push up on medium green ball with short hold 20 x   Mini push up, slight hold. Wall push up 20 x  x Window sill 7.21   Pertubations of ball 2x30\" ea 1.1 lb x Flexion/ext  Medial/lateral   Ball flexion on wall 15 x   Lime green medium ball   Small circles on wall 15 x ea  x CW, CCW; lime green medium ball. Horizontal abduction 10 x Lime x    B ER 10 x Lime     Rebounder tosses 20 x 4 lb (lime small weighted ball) x Forward (held IR 7/26/22)   Body Blade 30 sec   x  Added 8.8                 Shoulder flexion 15 x 2 lbs x Cane, HEP 7/29/22   Shoulder abd 15 x 2 lb x Cane, HEP 7/29/22   Shoulder scaption 15 x AROM  HEP 7/29/22   Biceps curls 20 x 3 lb  HEP 7/29/22          Thera bands       Rows 20 x grey x    Triceps 20 x grey x HEP 7/29/22   Extension 20 x grey x    Lat pull down 10 x grey x Tactile cueing   Bicep curls 20x purple x Added 8.8          Cane ext 20x 2 lbs x HEP 7/29/22   Cane IR 20x 2 lbs x HEP 7/29/22                  Upper trap stretch with arm behind back 3 x ea 10 sec  Some N/T right arm with stretch. HEP          Manual  4 minutes   Hypervolt along rhomboids, teres, upper traps          Posture 2 min   Education. HEP to stretch 10 x per hour, holding 5 seconds; repeat 10 x throughout the day.    Posterior shoulder rolls 10x   AA for technique  Added 7.19          Prone on total gym       - extension 15 x 3 lb  HEP 7/29/22   - horizontal abduction 15 x 3 lb  HEP 7/29/22   - rows 15 x  3 lb  HEP 7/29/22   -flexion 10 x 3 lb                     Other:      Treatment Charges: Mins Units   []  Modalities     [x]  Ther Exercise 48 3   []  Manual Therapy     []  Ther Activities     []  Aquatics     []  Vasocompression     []  Other     Total Treatment time 48 3       Assessment: [x] Progressing toward goals. BodyBlade added to improve intrinic shoulder muscles necessary for stabilization. Shoulder flexion completed with back against the wall to improved technique without compensation. Good tolerance of increased resistance added to thera bands. Reviewed STG.      [] No change. [] Other:     [x] Patient would continue to benefit from skilled physical therapy services in order to: improved posture, improved ROM, improved strength, decreased tingling/numbness in arm, decreased pain in right shoulder and down right arm, decrease cervical spine pain, improve headaches. STG: (to be met in 6 treatments)  ? Pain: Right shoulder pain improve to 5/10 with active movements, MET at 1/10 at most pain  ? ROM: Seated right shoulder abduction improve to 130 degrees without pain.  degrees  Supine right shoulder abduction improve to 140 degrees without pain.  degrees   ? Strength: Right shoulder strength improve to 4/5 without pain MET 5/5 with no pain  ? Function: Patient to report chores around her home have become easier MET, occasional difficulty getting things from things from the high cabinet  Patient to be independent with home exercise program as demonstrated by performance with correct form without cues. MET     LTG: (to be met in 12 treatments)  Right shoulder pain improve to 2/10 at max with heavy lifting  Supine right shoulder ER improve to 90 degrees, IR to 90 degrees. Patient able to complete all ADLs and IADLs without significant pain  Patient to sit with more upright posture. UEFI score improve to lacking 35% or less. Patient to report resolution of pain radiating in to right arm  Cervical ROM improve to: flexion 50 degrees, extension 45 degrees, left rotation 80 degrees, left side bend 30 degrees. Patient goals: (none stated)    Pt.  Education:  [x] Yes  [] No  [] Reviewed Prior HEP/Ed  Method of Education: [x] Verbal  [x] Demo  [] Written    Comprehension of Education:  [] Verbalizes understanding. [x] Demonstrates understanding. [x] Needs review. [x] Demonstrates/verbalizes HEP/Ed previously given. Access Code: Nicolas Lorenzo: [email or web address]/Date: 07/21/2022Prepared by: [address] with Swanson [employer] - 1 x daily - 7 x weekly - 3 sets - 10 reps   TB ER - 1 x daily - 7 x weekly - 3 sets - 10 reps   TB IR - 1 x daily - 7 x weekly - 3 sets - 10 reps   TB Shoulder extension - 1 x daily - 7 x weekly - 3 sets - 10 reps   TB Rows - 1 x daily - 7 x weekly - 3 sets - 10 reps   HAB - 1 x daily - 7 x weekly - 3 sets - 10 reps   Standing Single Arm Shoulder External Rotation in Abduction with Anchored Resistance - 1 x daily - 7 x weekly - 3 sets - 10 reps   Reverse push ups - 1 x daily - 7 x weekly - 3 sets - 10 reps   Wall Push Up - 1 x daily - 7 x weekly - 3 sets - 10 reps     Plan: [x] Continue current frequency toward long and short term goals.     [x] Specific Instructions for subsequent treatments:  Supine over half roll to stretch chest.        Time In: 1302         Time Out: 1350    Electronically signed by:  Chuck Ascencio PTA

## 2022-08-09 DIAGNOSIS — R12 HEARTBURN: ICD-10-CM

## 2022-08-09 RX ORDER — FAMOTIDINE 20 MG/1
TABLET, FILM COATED ORAL
Qty: 30 TABLET | Refills: 5 | Status: SHIPPED | OUTPATIENT
Start: 2022-08-09

## 2022-08-09 NOTE — TELEPHONE ENCOUNTER
Last visit: 6/6/22  Last Med refill: 5/9/22  Does patient have enough medication for 72 hours: No:     Next Visit Date:  Future Appointments   Date Time Provider Joslyn Mandujano   8/12/2022  1:00 PM Gagandeep Hunter, PT [facility]   8/15/2022  1:00 PM Renetta Mayfield PTA [facility]   8/16/2022 11:00 AM Jaron Odonnell MD [email or web address]   8/19/2022  1:00 PM Renetta Mayfield, PTA [facility] [address] Maintenance   Topic Date Due    HIV screen  Never done    DTaP/Tdap/Td vaccine (1 - Tdap) Never done    Shingles vaccine (1 of 2) Never done    COVID-19 Vaccine (3 - Booster for Moderna series) 11/10/2021    Flu vaccine (1) 09/01/2022    Colorectal Cancer Screen  09/21/2022    Depression Monitoring  06/06/2023    Annual Wellness Visit (AWV)  06/07/2023    Diabetes screen  01/29/2024    Breast cancer screen  05/17/2024    Lipids  02/07/2027    Hepatitis C screen  Completed    Hepatitis A vaccine  Aged Out    Hepatitis B vaccine  Aged Out    Hib vaccine  Aged Out    Meningococcal (ACWY) vaccine  Aged Out    Pneumococcal 0-64 years Vaccine  Aged Out       Hemoglobin A1C (%)   Date Value   01/29/2021 5.3   03/20/2017 5.5             ( goal A1C is < 7)   No results found for: LABMICR  LDL Cholesterol (mg/dL)   Date Value   02/07/2022 101   01/29/2021 100       (goal LDL is <100)   AST (U/L)   Date Value   02/07/2022 22     ALT (U/L)   Date Value   02/07/2022 19     BUN (mg/dL)   Date Value   02/07/2022 13     BP Readings from Last 3 Encounters:   06/06/22 130/70   05/09/22 134/80   01/31/22 110/70          (goal 120/80)    All Future Testing planned in [facility]  Lab Frequency Next Occurrence               Patient Active Problem List:     Deaf     GERD (gastroesophageal reflux disease)     Polyp of colon     Gastroesophageal reflux disease with esophagitis

## 2022-08-12 ENCOUNTER — HOSPITAL ENCOUNTER (OUTPATIENT)
Dept: PHYSICAL THERAPY | Age: 57
Setting detail: THERAPIES SERIES
Discharge: HOME | End: 2022-08-12
Payer: MEDICARE

## 2022-08-12 PROCEDURE — 97110 THERAPEUTIC EXERCISES: CPT

## 2022-08-12 PROCEDURE — 97140 MANUAL THERAPY 1/> REGIONS: CPT

## 2022-08-12 NOTE — FLOWSHEET NOTE
[x] [facility]  [address]  [phone]  F: [phone] [] [address]  [place] [address]   [address]  P: [phone]  F: [phone] [] [address]  [address]  P: [phone]  F: [phone] [] [address]  P: [phone]  F: [phone] [] [address]  [facility]   [address]   [place]: [phone]  F: [phone]      Physical Therapy Daily Treatment Note    Date:  2022  Patient Name:  Diana Miranda    :  1965  MRN: [id number]  Physician: Spike Gonzalez MD                                   Insurance: Medicare, [address] [place] [address], Medicaid  Medical Diagnosis: Right shoulder pain, tendinitis of right rotator cuff                     Rehab Codes: M 25.511, M 25.611, M 54.2, M 62.81, R 20.2, R 29.3  Onset Date: 22                 Next 's appt: Not scheduled  Visit# / total visits: ; Progress note for Medicare patient due at visit 10     Cancels/No Shows: 0/0    Subjective:    Pain:  [x] Yes  [] No Location: Right shoulder   Pain Rating: (0-10 scale) 2/10  Pain altered Tx:  [x] No  [] Yes  Action:  Comments:  States pain is still the same - nothing new. Has been painting, helping nephew get house ready. Is not able to rest arm much. Objective:  Modalities:  Precautions:  Augustine Frost present for interpretation. Per sister, on the autism spectrum. From Dr. Chuckie Whiting: Work on Right shoulder ROM. RTC/Scapular stabilizer strengthening. Biceps strengthening. Also work on stretching/strengthening Cervical spine. Evaluate and treat. Modalities as needed. Teach home exercise program.   2-3 times a week for 4-6 weeks.   Exercises:  Exercise Reps/ Time Weight/ Level Completed 22 Ther Activities     []  Aquatics     []  Vasocompression     []  Other     Total Treatment time 33        Assessment: [] Progressing toward goals. [x] No change. Completed exs as above - patient struggled with dumbbell flex/abd/AROM scaption. Prone exs were fatiguing as well. Patient found weighted ball on wall and BodyBlade to be tiring. Opted to resume manual exs this date due to increased pain in shoulder after completing exs above. With manual, patient reported decreased pain down to 2/10. Asked pt if she wanted to resume exs, patient refused, stating she was feeling good after manual and did not want to make shoulder hurt again. Demo for pt and had patient practice doorway stretch as she cannot do corner stretch at home.       [] Other:     [x] Patient would continue to benefit from skilled physical therapy services in order to: improved posture, improved ROM, improved strength, decreased tingling/numbness in arm, decreased pain in right shoulder and down right arm, decrease cervical spine pain, improve headaches. STG: (to be met in 6 treatments)  ? Pain: Right shoulder pain improve to 5/10 with active movements, MET at 1/10 at most pain  ? ROM: Seated right shoulder abduction improve to 130 degrees without pain.  degrees  Supine right shoulder abduction improve to 140 degrees without pain.  degrees   ? Strength: Right shoulder strength improve to 4/5 without pain MET 5/5 with no pain  ? Function: Patient to report chores around her home have become easier MET, occasional difficulty getting things from things from the high cabinet  Patient to be independent with home exercise program as demonstrated by performance with correct form without cues. MET     LTG: (to be met in 12 treatments)  Right shoulder pain improve to 2/10 at max with heavy lifting  Supine right shoulder ER improve to 90 degrees, IR to 90 degrees.   Patient able to complete all ADLs and IADLs without significant pain  Patient to sit with more upright posture. UEFI score improve to lacking 35% or less. Patient to report resolution of pain radiating in to right arm  Cervical ROM improve to: flexion 50 degrees, extension 45 degrees, left rotation 80 degrees, left side bend 30 degrees. Patient goals: (none stated)    Pt. Education:  [x] Yes  [] No  [] Reviewed Prior HEP/Ed  Method of Education: [x] Verbal  [x] Demo  [] Written  -- Demo for pt and had patient practice doorway stretch as she cannot do corner stretch at home. Comprehension of Education:  [] Verbalizes understanding. [x] Demonstrates understanding. [x] Needs review. [x] Demonstrates/verbalizes HEP/Ed previously given. Access Code: Shon Trevon: [email or web address]: 07/21/2022Prepared by: [address] with Swanson Soup - 1 x daily - 7 x weekly - 3 sets - 10 reps   TB ER - 1 x daily - 7 x weekly - 3 sets - 10 reps   TB IR - 1 x daily - 7 x weekly - 3 sets - 10 reps   TB Shoulder extension - 1 x daily - 7 x weekly - 3 sets - 10 reps   TB Rows - 1 x daily - 7 x weekly - 3 sets - 10 reps   HAB - 1 x daily - 7 x weekly - 3 sets - 10 reps   Standing Single Arm Shoulder External Rotation in Abduction with Anchored Resistance - 1 x daily - 7 x weekly - 3 sets - 10 reps   Reverse push ups - 1 x daily - 7 x weekly - 3 sets - 10 reps   Wall Push Up - 1 x daily - 7 x weekly - 3 sets - 10 reps     Plan: [x] Continue current frequency toward long and short term goals.     [x] Specific Instructions for subsequent treatments:  Supine over half roll to stretch chest.        Time In: 1302         Time Out: 1342    Electronically signed by:  Juan Manuel Chen PT

## 2022-08-15 ENCOUNTER — HOSPITAL ENCOUNTER (OUTPATIENT)
Dept: PHYSICAL THERAPY | Age: 57
Setting detail: THERAPIES SERIES
Discharge: HOME | End: 2022-08-15
Payer: MEDICARE

## 2022-08-15 PROCEDURE — 97140 MANUAL THERAPY 1/> REGIONS: CPT

## 2022-08-15 PROCEDURE — 97110 THERAPEUTIC EXERCISES: CPT

## 2022-08-15 NOTE — FLOWSHEET NOTE
[x] [facility] CENTER &  Therapy  [address]  [phone]  F: [phone] [] [address]  [facility]   [address]  P: [phone]  F: [phone] [] [address]  [address]  P: [phone]  F: [phone] [] [address]  P: [phone]  F: [phone] [] [address]  [facility]   [address]   [place]: [phone]  F: [phone]      Physical Therapy Daily Treatment Note    Date:  8/15/2022  Patient Name:  Kirk Romero    :  1965  MRN: [id number]  Physician: Abigail Caicedo MD                                   Insurance: Medicare, [address] [place] [address], Medicaid  Medical Diagnosis: Right shoulder pain, tendinitis of right rotator cuff                     Rehab Codes: M 25.511, M 25.611, M 54.2, M 62.81, R 20.2, R 29.3  Onset Date: 22                 Next 's appt: Not scheduled  Visit# / total visits:  (updated 8/15); Progress note for Medicare patient due at visit 10     Cancels/No Shows: 0/0    Subjective:    Pain:  [x] Yes  [] No Location: Right shoulder   Pain Rating: (0-10 scale) 2/10  Pain altered Tx:  [x] No  [] Yes  Action:  Comments:  Patient reports no change from last visit. Still painting the house over the weekend. Shoulder is sore but not much pain. Objective:  Modalities:  Precautions:  Maria Antonia Burdick present for interpretation. Per sister, on the autism spectrum. From Dr. Simmons Smaller: Work on Right shoulder ROM. RTC/Scapular stabilizer strengthening. Biceps strengthening. Also work on stretching/strengthening Cervical spine. Evaluate and treat. Modalities as needed. Teach home exercise program.   2-3 times a week for 4-6 weeks.   Exercises:  Exercise Reps/ Time Weight/ Level Completed 08/15/22  Comments   SCIFIT 5 min L 2.0  Change to UBE next visit. UBE 4 min L 4.0 x Inc resistance 8.8   Corner stretch 3 x 10 sec x VC for stretching with no pain   Reverse wall push ups 20 x  x    Mini wall push up on medium green ball with short hold 20 x   Mini push up, slight hold. Wall push up 20 x  x Window sill 7.21   Pertubations of ball 2x30\" ea 1.1 lb x Flexion/ext  Medial/lateral  CW/CCW rotation   Ball flexion on wall 15 x   Lime green medium ball   Small circles on wall 15 x ea  x CW, CCW; lime green medium ball. Horizontal abduction 10 x Lime     B ER 10 x Lime     Rebounder tosses 20 x              4 lb (lime small weighted ball) x Forward (resumed IR tosses in future appts)   Body Blade 3 x 60 sec  x Forward, retro  Medial, lateral  Elbow 90 degrees at side. Shoulder flexion 20 x 3 lbs x    Shoulder abd 10x2 3 lbs x Fatigued    Shoulder scaption 20 x AROM x    Biceps curls 20 x 3 lb x           Thera bands       Rows 20 x grey x    Triceps 20 x grey x    Extension 20 x grey x    Lat pull down 10 x grey x Tactile cueing   Bicep curls 20x purple  Added 8.8   IR 20x purple x Added 8.15   ER 20x purple x Added 8.15   Cane ext 20x 2 lbs x    Cane IR 20x 2 lbs x                   Upper trap stretch with arm behind back 3 x ea 10 sec x Some N/T right arm with stretch. HEP          Manual  8 minutes   Supine to cervical and thoracic parapsinals, PROM to bilateral shoulders, trigger point releases. Posture 2 min   Education. HEP to stretch 10 x per hour, holding 5 seconds; repeat 10 x throughout the day.    Posterior shoulder rolls 15x 2 lb x AA for technique  Added 7.19          Prone on total gym       - extension 15 x 3 lb x    - horizontal abduction 15 x 3 lb x    - rows 15 x  3 lb x    -flexion 10 x 3 lb x                    Other:         Treatment Charges: Mins Units   []  Modalities     [x]  Ther Exercise 41 2   [x]  Manual Therapy 8 1   []  Ther Activities []  Aquatics     []  Vasocompression     []  Other     Total Treatment time 49 3       Assessment: [x] Progressing toward goals. Resumed previous exercises this date with overall improvement. Fatigues quickly in prone with high weight. Increased duration of Body Blade to improve muscular endurance. Thera band IR/ER added to increase stabilization with no increase in pain or symptoms. Ended with manual therapy to decrease trigger points in R UT.       [] No change. [x] Other: Patient with 1 remaining visit    [x] Patient would continue to benefit from skilled physical therapy services in order to: improved posture, improved ROM, improved strength, decreased tingling/numbness in arm, decreased pain in right shoulder and down right arm, decrease cervical spine pain, improve headaches. STG: (to be met in 6 treatments)  ? Pain: Right shoulder pain improve to 5/10 with active movements, MET at 1/10 at most pain  ? ROM: Seated right shoulder abduction improve to 130 degrees without pain.  degrees  Supine right shoulder abduction improve to 140 degrees without pain.  degrees   ? Strength: Right shoulder strength improve to 4/5 without pain MET 5/5 with no pain  ? Function: Patient to report chores around her home have become easier MET, occasional difficulty getting things from things from the high cabinet  Patient to be independent with home exercise program as demonstrated by performance with correct form without cues. MET     LTG: (to be met in 12 treatments)  Right shoulder pain improve to 2/10 at max with heavy lifting  Supine right shoulder ER improve to 90 degrees, IR to 90 degrees. Patient able to complete all ADLs and IADLs without significant pain  Patient to sit with more upright posture. UEFI score improve to lacking 35% or less.   Patient to report resolution of pain radiating in to right arm  Cervical ROM improve to: flexion 50 degrees, extension 45 degrees, left rotation 80 degrees, left side bend 30 degrees. Patient goals: (none stated)    Pt. Education:  [x] Yes  [] No  [] Reviewed Prior HEP/Ed  Method of Education: [x] Verbal  [x] Demo  [] Written  -- Demo for pt and had patient practice doorway stretch as she cannot do corner stretch at home. Comprehension of Education:  [] Verbalizes understanding. [x] Demonstrates understanding. [x] Needs review. [x] Demonstrates/verbalizes HEP/Ed previously given. Access Code: Bre Rosales: [email or web address]/Date: 07/21/2022Prepared by: [address] with Sw[employer] - 1 x daily - 7 x weekly - 3 sets - 10 reps   TB ER - 1 x daily - 7 x weekly - 3 sets - 10 reps   TB IR - 1 x daily - 7 x weekly - 3 sets - 10 reps   TB Shoulder extension - 1 x daily - 7 x weekly - 3 sets - 10 reps   TB Rows - 1 x daily - 7 x weekly - 3 sets - 10 reps   HAB - 1 x daily - 7 x weekly - 3 sets - 10 reps   Standing Single Arm Shoulder External Rotation in Abduction with Anchored Resistance - 1 x daily - 7 x weekly - 3 sets - 10 reps   Reverse push ups - 1 x daily - 7 x weekly - 3 sets - 10 reps   Wall Push Up - 1 x daily - 7 x weekly - 3 sets - 10 reps     Plan: [x] Continue current frequency toward long and short term goals.     [x] Specific Instructions for subsequent treatments:  Supine over half roll to stretch chest.        Time In: 1300        Time Out: 1349    Electronically signed by:  Raymond Tejada PTA

## 2022-08-19 ENCOUNTER — HOSPITAL ENCOUNTER (OUTPATIENT)
Dept: PHYSICAL THERAPY | Age: 57
Setting detail: THERAPIES SERIES
Discharge: HOME | End: 2022-08-19
Payer: MEDICARE

## 2022-08-19 PROCEDURE — 97110 THERAPEUTIC EXERCISES: CPT

## 2022-08-19 NOTE — FLOWSHEET NOTE
SCIFIT 5 min L 2.0  Change to UBE next visit. UBE 4 min L 4.0 x Inc resistance 8.8   Corner stretch 3 x 10 sec  VC for stretching with no pain   Reverse wall push ups 20 x      Mini wall push up on medium green ball with short hold 20 x   Mini push up, slight hold. Wall push up 20 x   Window sill 7.21   Pertubations of ball 2x30\" ea 1.1 lb  Flexion/ext  Medial/lateral  CW/CCW rotation   Ball flexion on wall 15 x   Lime green medium ball   Small circles on wall 15 x ea   CW, CCW; lime green medium ball. Horizontal abduction 10 x Lime     B ER 10 x Lime     Rebounder tosses 20 x              4 lb (lime small weighted ball)  Forward (resumed IR tosses in future appts)   Body Blade 3 x 60 sec   Forward, retro  Medial, lateral  Elbow 90 degrees at side. Shoulder flexion 20 x 3 lbs x Regressed to 2 lbs    Shoulder abd 10x2 3 lbs x Fatigued    Shoulder scaption 20 x AROM x    Biceps curls 20 x 3 lb            Thera bands       Rows 20 x grey x    Triceps 20 x grey x    Extension 20 x grey x    Lat pull down 10 x grey x Tactile cueing   Bicep curls 20x purple x Added 8.8   IR 20x purple x Added 8.15   ER 20x purple x Added 8.15   Cane ext 20x 2 lbs x    Cane IR 20x 2 lbs x    Cane ER 20x 2 lbs x            Upper trap stretch with arm behind back 3 x ea 10 sec x Some N/T right arm with stretch. HEP          Manual  8 minutes   Supine to cervical and thoracic parapsinals, PROM to bilateral shoulders, trigger point releases. Posture 2 min   Education. HEP to stretch 10 x per hour, holding 5 seconds; repeat 10 x throughout the day.    Posterior shoulder rolls 15x 2 lb  AA for technique  Added 7.19          Prone on total gym       - extension 15 x 3 lb     - horizontal abduction 15 x 3 lb     - rows 15 x  3 lb     -flexion 10 x 3 lb                     Other:         Treatment Charges: Mins Units   []  Modalities     [x]  Ther Exercise 47 3   []  Manual Therapy     []  Ther Activities []  Aquatics     []  Vasocompression     []  Other     Total Treatment time 47 3   Subtracted 8 min for UEDI    Assessment: [x] Progressing toward goals. Regressed with shoulder flexion and abd due pain noted in R shoulder. Cues for scapular depression with improved tolerance. Completed LTG with assist of . Required additional time for UEFI to complete with understanding and required translation. Patient reports she is compliant with HEP and has her copy and bands at home. [] No change. [] Other:   [x] Patient would continue to benefit from skilled physical therapy services in order to: improved posture, improved ROM, improved strength, decreased tingling/numbness in arm, decreased pain in right shoulder and down right arm, decrease cervical spine pain, improve headaches. STG: (to be met in 6 treatments)  ? Pain: Right shoulder pain improve to 5/10 with active movements, MET at 1/10 at most pain  ? ROM: Seated right shoulder abduction improve to 130 degrees without pain.  degrees  Supine right shoulder abduction improve to 140 degrees without pain.  degrees   ? Strength: Right shoulder strength improve to 4/5 without pain MET 5/5 with no pain  ? Function: Patient to report chores around her home have become easier MET, occasional difficulty getting things from things from the high cabinet  Patient to be independent with home exercise program as demonstrated by performance with correct form without cues. MET     LTG: (to be met in 12 treatments)  Right shoulder pain improve to 2/10 at max with heavy lifting MET  Supine right shoulder ER improve to 90 degrees, IR to 90 degrees. MET  Patient able to complete all ADLs and IADLs without significant pain MET  Patient to sit with more upright posture. MET, patient self corrects posture during exercises. UEFI score improve to lacking 35% or less.  MET lacking 18% function  Patient to report resolution of pain radiating in to right arm, Progress made, only pain down the arm when sleeping on it  Cervical ROM improve to: flexion 50 (40) degrees, extension 45 degrees, left rotation 80 degrees, left side bend 30 degrees. Progress made, limited ext to 30 degrees, MET with cervical side bending and rotation                 Patient goals: (none stated)    Pt. Education:  [x] Yes  [] No  [x] Reviewed Prior HEP/Ed  Method of Education: [x] Verbal  [x] Demo  [] Written  --   Comprehension of Education:  [] Verbalizes understanding. [x] Demonstrates understanding. [x] Needs review. [x] Demonstrates/verbalizes HEP/Ed previously given. Access Code: Lucy Hernandez: [email or web address]/Date: 07/21/2022Prepared by: [address] with [employer] - 1 x daily - 7 x weekly - 3 sets - 10 reps   TB ER - 1 x daily - 7 x weekly - 3 sets - 10 reps   TB IR - 1 x daily - 7 x weekly - 3 sets - 10 reps   TB Shoulder extension - 1 x daily - 7 x weekly - 3 sets - 10 reps   TB Rows - 1 x daily - 7 x weekly - 3 sets - 10 reps   HAB - 1 x daily - 7 x weekly - 3 sets - 10 reps   Standing Single Arm Shoulder External Rotation in Abduction with Anchored Resistance - 1 x daily - 7 x weekly - 3 sets - 10 reps   Reverse push ups - 1 x daily - 7 x weekly - 3 sets - 10 reps   Wall Push Up - 1 x daily - 7 x weekly - 3 sets - 10 reps     Plan: [x] Discharge with completion of therapy        Time In: 1300        Time Out: 3241    Electronically signed by:  Alexander Camarena PTA

## 2022-08-23 NOTE — DISCHARGE SUMMARY
[x] [facility]  Therapy  [address]  [phone]  F: [phone] [] [address]  [facility]   [address]  P: [phone]  F: [phone] [] [address] &  Therapy  [address]  P: [phone]  F: [phone] [] [address]  P: [phone]  F: [phone] [] [address]  Psychiatric   [address]   [place]: [phone]  F: [phone]      Physical Therapy Discharge Note    Date: 2022      Patient: Becca Villagomez  : 1965  MRN: [id number] Physician: Alvin Ho MD                                   Insurance: Medicare, [address] [place] [address], Medicaid  Medical Diagnosis: Right shoulder pain, tendinitis of right rotator cuff                     Rehab Codes: M 25.511, M 25.611, M 54.2, M 62.81, R 20.2, R 29.3  Onset Date: 22                 Next 's appt: Not scheduled  Visit# / total visits:  (updated 8/15); Progress note for Medicare patient due at visit 10                                 Cancels/No Shows: 0/0  Date of initial visit: 22                Date of final visit: 22    Subjective:    Pain:  [x] Yes  [] No   Location: Right shoulder           Pain Rating: (0-10 scale) 2/10  Pain altered Tx:  [x] No  [] Yes  Action:  Comments:  Patient reports pain is doing better. Has been working on the house with carrying boxes and painting. Objective:  Test Measurements/Function: Pain has improved to be minimal, possibly up to 2/10 at max. Patient is very active - painting her nephews home with some fatigue in shoulder, but not having to modify her work. Supine ER/IR to 90 degrees. Patient able to sit upright but prefers flexed posture. UEFI score of 18% functionally impaired. Reports pain in arm when sleeping on arm. Cervical ROM:  Flexion 40 degrees, extension 30 degrees, left rotation 80 degrees, left side bend 30 degrees. Assessment:  STG: (to be met in 6 treatments)  ? Pain: Right shoulder pain improve to 5/10 with active movements, MET at 1/10 at most pain  ? ROM: Seated right shoulder abduction improve to 130 degrees without pain.  degrees  Supine right shoulder abduction improve to 140 degrees without pain.  degrees   ? Strength: Right shoulder strength improve to 4/5 without pain MET 5/5 with no pain  ? Function: Patient to report chores around her home have become easier MET, occasional difficulty getting things from things from the high cabinet  Patient to be independent with home exercise program as demonstrated by performance with correct form without cues. MET     LTG: (to be met in 12 treatments)  Right shoulder pain improve to 2/10 at max with heavy lifting MET  Supine right shoulder ER improve to 90 degrees, IR to 90 degrees. MET  Patient able to complete all ADLs and IADLs without significant pain MET  Patient to sit with more upright posture. MET, patient self corrects posture during exercises. UEFI score improve to lacking 35% or less. MET lacking 18% function  Patient to report resolution of pain radiating in to right arm, Progress made, only pain down the arm when sleeping on it  Cervical ROM improve to: flexion 50 degrees, extension 45 degrees, left rotation 80 degrees, left side bend 30 degrees.  Progress made, flexion 40 degrees, limited ext to 30 degrees, MET with cervical side bending and rotation                 Patient goals: (none stated)    Treatment to Date:  [x] Therapeutic Exercise    [] Modalities:  [] Therapeutic Activity    [] Ultrasound  [] Electrical Stimulation  [] Gait Training     [] Massage       [] Lumbar/Cervical Traction  [] Neuromuscular Re-education [] Cold/hotpack [] Iontophoresis: 4 mg/mL  [x] Instruction in Home Exercise Program                     Dexamethasone Sodium  [x] Manual Therapy             Phosphate 40-80 mAmin  [] Aquatic Therapy                   [] Vasocompression/    [] Other:             Game Ready    Discharge Status:     [] Pt recovered from conditions. Treatment goals were met. [x] Pt received maximum benefit. No further therapy indicated at this time. [x] Pt to continue exercise/home instructions independently. [] Therapy interrupted due to:    [] Pt has 2 or more no shows/cancels, is discontinued per our policy. [x] Pt has completed prescribed number of treatment sessions. [] Other:         Electronically signed by Alyce Leyva PT on 8/24/2022 at 8:00 AM      If you have any questions or concerns, please don't hesitate to call.   Thank you for your referral.

## 2022-09-26 ENCOUNTER — HOSPITAL ENCOUNTER (OUTPATIENT)
Age: 57
Setting detail: SPECIMEN
Discharge: HOME | End: 2022-09-26

## 2022-09-26 ENCOUNTER — OFFICE VISIT (OUTPATIENT)
Dept: FAMILY MEDICINE CLINIC | Age: 57
End: 2022-09-26
Payer: MEDICARE

## 2022-09-26 VITALS
HEART RATE: 106 BPM | DIASTOLIC BLOOD PRESSURE: 70 MMHG | OXYGEN SATURATION: 98 % | WEIGHT: 170 LBS | SYSTOLIC BLOOD PRESSURE: 104 MMHG | BODY MASS INDEX: 29.18 KG/M2

## 2022-09-26 DIAGNOSIS — R51.9 CHRONIC INTRACTABLE HEADACHE, UNSPECIFIED HEADACHE TYPE: ICD-10-CM

## 2022-09-26 DIAGNOSIS — F32.A MILD DEPRESSION: ICD-10-CM

## 2022-09-26 DIAGNOSIS — Z23 NEED FOR INFLUENZA VACCINATION: ICD-10-CM

## 2022-09-26 DIAGNOSIS — G89.29 CHRONIC INTRACTABLE HEADACHE, UNSPECIFIED HEADACHE TYPE: ICD-10-CM

## 2022-09-26 DIAGNOSIS — R07.89 CHEST WALL PAIN: ICD-10-CM

## 2022-09-26 DIAGNOSIS — R07.89 CHEST WALL PAIN: Primary | ICD-10-CM

## 2022-09-26 LAB
ANION GAP SERPL CALCULATED.3IONS-SCNC: 12 MMOL/L (ref 9–17)
BUN BLDV-MCNC: 12 MG/DL (ref 6–20)
CALCIUM SERPL-MCNC: 9.9 MG/DL (ref 8.6–10.4)
CHLORIDE BLD-SCNC: 100 MMOL/L (ref 98–107)
CO2: 28 MMOL/L (ref 20–31)
CREAT SERPL-MCNC: 0.7 MG/DL (ref 0.5–0.9)
GFR AFRICAN AMERICAN: >60 ML/MIN
GFR NON-AFRICAN AMERICAN: >60 ML/MIN
GFR SERPL CREATININE-BSD FRML MDRD: NORMAL ML/MIN/{1.73_M2}
GLUCOSE BLD-MCNC: 96 MG/DL (ref 70–99)
HCT VFR BLD CALC: 41.7 % (ref 36.3–47.1)
HEMOGLOBIN: 13.8 G/DL (ref 11.9–15.1)
LIPASE: 23 U/L (ref 13–60)
MCH RBC QN AUTO: 31.3 PG (ref 25.2–33.5)
MCHC RBC AUTO-ENTMCNC: 33.1 G/DL (ref 28.4–34.8)
MCV RBC AUTO: 94.6 FL (ref 82.6–102.9)
NRBC AUTOMATED: 0 PER 100 WBC
PDW BLD-RTO: 11.5 % (ref 11.8–14.4)
PLATELET # BLD: 266 K/UL (ref 138–453)
PMV BLD AUTO: 9.3 FL (ref 8.1–13.5)
POTASSIUM SERPL-SCNC: 5.2 MMOL/L (ref 3.7–5.3)
RBC # BLD: 4.41 M/UL (ref 3.95–5.11)
SODIUM BLD-SCNC: 140 MMOL/L (ref 135–144)
WBC # BLD: 7.5 K/UL (ref 3.5–11.3)

## 2022-09-26 PROCEDURE — 3017F COLORECTAL CA SCREEN DOC REV: CPT | Performed by: NURSE PRACTITIONER

## 2022-09-26 PROCEDURE — G8427 DOCREV CUR MEDS BY ELIG CLIN: HCPCS | Performed by: NURSE PRACTITIONER

## 2022-09-26 PROCEDURE — G8417 CALC BMI ABV UP PARAM F/U: HCPCS | Performed by: NURSE PRACTITIONER

## 2022-09-26 PROCEDURE — 99214 OFFICE O/P EST MOD 30 MIN: CPT | Performed by: NURSE PRACTITIONER

## 2022-09-26 PROCEDURE — G0008 ADMIN INFLUENZA VIRUS VAC: HCPCS | Performed by: NURSE PRACTITIONER

## 2022-09-26 PROCEDURE — 1036F TOBACCO NON-USER: CPT | Performed by: NURSE PRACTITIONER

## 2022-09-26 PROCEDURE — 90674 CCIIV4 VAC NO PRSV 0.5 ML IM: CPT | Performed by: NURSE PRACTITIONER

## 2022-09-26 RX ORDER — PREDNISONE 20 MG/1
20 TABLET ORAL 2 TIMES DAILY
Qty: 10 TABLET | Refills: 0 | Status: SHIPPED | OUTPATIENT
Start: 2022-09-26 | End: 2022-10-01

## 2022-09-26 RX ORDER — DOCUSATE SODIUM 100 MG/1
100 CAPSULE, LIQUID FILLED ORAL DAILY PRN
Qty: 14 CAPSULE | Refills: 0 | Status: SHIPPED | OUTPATIENT
Start: 2022-09-26 | End: 2022-10-26

## 2022-09-26 RX ORDER — AMITRIPTYLINE HYDROCHLORIDE 50 MG/1
TABLET, FILM COATED ORAL
Qty: 30 TABLET | Refills: 5 | Status: SHIPPED | OUTPATIENT
Start: 2022-09-26

## 2022-09-26 SDOH — ECONOMIC STABILITY: FOOD INSECURITY: WITHIN THE PAST 12 MONTHS, THE FOOD YOU BOUGHT JUST DIDN'T LAST AND YOU DIDN'T HAVE MONEY TO GET MORE.: NEVER TRUE

## 2022-09-26 SDOH — ECONOMIC STABILITY: FOOD INSECURITY: WITHIN THE PAST 12 MONTHS, YOU WORRIED THAT YOUR FOOD WOULD RUN OUT BEFORE YOU GOT MONEY TO BUY MORE.: NEVER TRUE

## 2022-09-26 ASSESSMENT — SOCIAL DETERMINANTS OF HEALTH (SDOH): HOW HARD IS IT FOR YOU TO PAY FOR THE VERY BASICS LIKE FOOD, HOUSING, MEDICAL CARE, AND HEATING?: NOT HARD AT ALL

## 2022-09-26 ASSESSMENT — ENCOUNTER SYMPTOMS: BACK PAIN: 1

## 2022-09-26 NOTE — PROGRESS NOTES
Patient is present with  complaining of upper and middle back pain  States the pain radiates around front to her ribs  States this has been going on for at least 5 weeks  States she does not recall an injury  States the pain has been getting worse  States she did take an ibuprofen 800 with no relief

## 2022-09-26 NOTE — PROGRESS NOTES
Shima George (:  1965) is a 62 y.o. female,Established patient, here for evaluation of the following chief complaint(s):  Back Pain         ASSESSMENT/PLAN:  1. Chest wall pain  -     CT CHEST WO CONTRAST; Future  -     CBC; Future  -     Basic Metabolic Panel; Future  -     predniSONE (DELTASONE) 20 MG tablet; Take 1 tablet by mouth 2 times daily for 5 days, Disp-10 tablet, R-0Normal  -     Lipase; Future  2. Mild depression (HCC)  -     sertraline (ZOLOFT) 50 MG tablet; Take 1 tablet by mouth daily, Disp-30 tablet, R-5Normal  3. Chronic intractable headache, unspecified headache type  -     amitriptyline (ELAVIL) 50 MG tablet; TAKE ONE TABLET BY MOUTH ONCE NIGHTLY, Disp-30 tablet, R-5Normal  4. Need for influenza vaccination  -     Influenza, FLUCELVAX, (age 10 mo+), IM, Preservative Free, 0.5 mL      No follow-ups on file. Subjective   SUBJECTIVE/OBJECTIVE:  Back Pain  Pertinent negatives include no chest pain or fever. Patient is present with  complaining of upper and middle back pain  States the pain radiates around front to her ribs  States this has been going on for at least 5 weeks- really I feel like she has been complaining of something similar for about a year. All communication done through interpretor. States she does not recall an injury  States the pain has been getting worse  States she did take an ibuprofen 800 with no relief     Review of Systems   Constitutional:  Negative for chills and fever. Respiratory:  Negative for cough and shortness of breath. Cardiovascular:  Negative for chest pain, palpitations and leg swelling. Musculoskeletal:  Positive for back pain. Objective   Vitals:    22 1544   BP: 104/70   Pulse: (!) 106   SpO2: 98%     Wt Readings from Last 3 Encounters:   22 170 lb (77.1 kg)   22 175 lb (79.4 kg)   06/15/22 175 lb (79.4 kg)       Physical Exam  Constitutional:       Appearance: She is well-developed. HENT:      Right Ear: External ear normal.      Left Ear: External ear normal.      Nose: Nose normal.   Cardiovascular:      Rate and Rhythm: Normal rate and regular rhythm. Heart sounds: Normal heart sounds, S1 normal and S2 normal.   Pulmonary:      Effort: Pulmonary effort is normal. No respiratory distress. Breath sounds: Normal breath sounds. Musculoskeletal:         General: No deformity. Normal range of motion. Cervical back: Full passive range of motion without pain and normal range of motion. Skin:     General: Skin is warm and dry. Neurological:      Mental Status: She is alert and oriented to person, place, and time. An electronic signature was used to authenticate this note.     --Deandre Maillard, APRN - CNP

## 2022-09-29 ENCOUNTER — TELEPHONE (OUTPATIENT)
Dept: ORTHOPEDIC SURGERY | Age: 57
End: 2022-09-29

## 2022-09-29 NOTE — TELEPHONE ENCOUNTER
Patient needs an  for her upcoming appointment that is scheduled for 11/08/22, Patient changed her appointment from October due to transportation issues. Please advise thank you!

## 2022-10-06 ASSESSMENT — ENCOUNTER SYMPTOMS
SHORTNESS OF BREATH: 0
COUGH: 0

## 2022-10-11 ENCOUNTER — HOSPITAL ENCOUNTER (OUTPATIENT)
Dept: CT IMAGING | Age: 57
Discharge: HOME | End: 2022-10-13
Payer: MEDICARE

## 2022-10-11 DIAGNOSIS — R07.89 CHEST WALL PAIN: ICD-10-CM

## 2022-10-11 PROCEDURE — 71250 CT THORAX DX C-: CPT

## 2022-11-08 ENCOUNTER — TELEPHONE (OUTPATIENT)
Dept: ORTHOPEDIC SURGERY | Age: 57
End: 2022-11-08

## 2022-11-08 NOTE — TELEPHONE ENCOUNTER
[facility] called office to inform us that the patient has to cancel her appt with Dr. Sagar Somers for today. Patient is hearing impaired and a  was scheduled to be at the appt with the patient. [facility] wanted to let the office know in case  needed to be notified. Antonio was notified and she was going to handle notifying the  of the appt cancellation.

## 2023-03-26 DIAGNOSIS — R12 HEARTBURN: ICD-10-CM

## 2023-03-27 RX ORDER — FAMOTIDINE 20 MG/1
TABLET, FILM COATED ORAL
Qty: 30 TABLET | Refills: 5 | OUTPATIENT
Start: 2023-03-27

## 2023-08-10 ENCOUNTER — HOSPITAL ENCOUNTER (OUTPATIENT)
Dept: MAMMOGRAPHY | Age: 58
Discharge: HOME | End: 2023-08-12
Payer: MEDICARE

## 2023-08-10 VITALS — BODY MASS INDEX: 29.19 KG/M2 | HEIGHT: 64 IN | WEIGHT: 171 LBS

## 2023-08-10 DIAGNOSIS — Z12.31 ENCOUNTER FOR SCREENING MAMMOGRAM FOR MALIGNANT NEOPLASM OF BREAST: ICD-10-CM

## 2023-08-10 PROCEDURE — 77063 BREAST TOMOSYNTHESIS BI: CPT

## 2023-08-15 ENCOUNTER — OFFICE VISIT (OUTPATIENT)
Dept: FAMILY MEDICINE CLINIC | Age: 58
End: 2023-08-15

## 2023-08-15 VITALS
OXYGEN SATURATION: 98 % | HEIGHT: 64 IN | WEIGHT: 170 LBS | BODY MASS INDEX: 29.02 KG/M2 | DIASTOLIC BLOOD PRESSURE: 74 MMHG | HEART RATE: 86 BPM | SYSTOLIC BLOOD PRESSURE: 122 MMHG

## 2023-08-15 DIAGNOSIS — R51.9 CHRONIC INTRACTABLE HEADACHE, UNSPECIFIED HEADACHE TYPE: ICD-10-CM

## 2023-08-15 DIAGNOSIS — Z12.11 SCREENING FOR MALIGNANT NEOPLASM OF COLON: ICD-10-CM

## 2023-08-15 DIAGNOSIS — R12 HEARTBURN: ICD-10-CM

## 2023-08-15 DIAGNOSIS — F32.A MILD DEPRESSION: ICD-10-CM

## 2023-08-15 DIAGNOSIS — Z00.00 MEDICARE ANNUAL WELLNESS VISIT, SUBSEQUENT: Primary | ICD-10-CM

## 2023-08-15 DIAGNOSIS — G89.29 CHRONIC INTRACTABLE HEADACHE, UNSPECIFIED HEADACHE TYPE: ICD-10-CM

## 2023-08-15 DIAGNOSIS — M54.9 UPPER BACK PAIN: ICD-10-CM

## 2023-08-15 DIAGNOSIS — F32.0 MAJOR DEPRESSIVE DISORDER, SINGLE EPISODE, MILD (HCC): ICD-10-CM

## 2023-08-15 RX ORDER — AMITRIPTYLINE HYDROCHLORIDE 50 MG/1
TABLET, FILM COATED ORAL
Qty: 30 TABLET | Refills: 5 | Status: SHIPPED | OUTPATIENT
Start: 2023-08-15

## 2023-08-15 RX ORDER — FAMOTIDINE 20 MG/1
20 TABLET, FILM COATED ORAL DAILY
Qty: 30 TABLET | Refills: 5 | Status: SHIPPED | OUTPATIENT
Start: 2023-08-15

## 2023-08-15 RX ORDER — IBUPROFEN 800 MG/1
800 TABLET ORAL 2 TIMES DAILY PRN
Qty: 14 TABLET | Refills: 0 | Status: SHIPPED | OUTPATIENT
Start: 2023-08-15 | End: 2023-08-22

## 2023-08-15 ASSESSMENT — PATIENT HEALTH QUESTIONNAIRE - PHQ9
SUM OF ALL RESPONSES TO PHQ QUESTIONS 1-9: 2
SUM OF ALL RESPONSES TO PHQ QUESTIONS 1-9: 2
3. TROUBLE FALLING OR STAYING ASLEEP: 0
1. LITTLE INTEREST OR PLEASURE IN DOING THINGS: 2
9. THOUGHTS THAT YOU WOULD BE BETTER OFF DEAD, OR OF HURTING YOURSELF: 0
10. IF YOU CHECKED OFF ANY PROBLEMS, HOW DIFFICULT HAVE THESE PROBLEMS MADE IT FOR YOU TO DO YOUR WORK, TAKE CARE OF THINGS AT HOME, OR GET ALONG WITH OTHER PEOPLE: 0
7. TROUBLE CONCENTRATING ON THINGS, SUCH AS READING THE NEWSPAPER OR WATCHING TELEVISION: 0
SUM OF ALL RESPONSES TO PHQ QUESTIONS 1-9: 2
6. FEELING BAD ABOUT YOURSELF - OR THAT YOU ARE A FAILURE OR HAVE LET YOURSELF OR YOUR FAMILY DOWN: 0
SUM OF ALL RESPONSES TO PHQ9 QUESTIONS 1 & 2: 2
SUM OF ALL RESPONSES TO PHQ QUESTIONS 1-9: 2
5. POOR APPETITE OR OVEREATING: 0
4. FEELING TIRED OR HAVING LITTLE ENERGY: 0
2. FEELING DOWN, DEPRESSED OR HOPELESS: 0
8. MOVING OR SPEAKING SO SLOWLY THAT OTHER PEOPLE COULD HAVE NOTICED. OR THE OPPOSITE, BEING SO FIGETY OR RESTLESS THAT YOU HAVE BEEN MOVING AROUND A LOT MORE THAN USUAL: 0

## 2023-08-15 ASSESSMENT — ENCOUNTER SYMPTOMS
COUGH: 0
SHORTNESS OF BREATH: 0
BACK PAIN: 1

## 2023-08-15 ASSESSMENT — LIFESTYLE VARIABLES
HOW MANY STANDARD DRINKS CONTAINING ALCOHOL DO YOU HAVE ON A TYPICAL DAY: PATIENT DOES NOT DRINK
HOW OFTEN DO YOU HAVE A DRINK CONTAINING ALCOHOL: NEVER

## 2023-10-27 ENCOUNTER — TELEPHONE (OUTPATIENT)
Dept: GASTROENTEROLOGY | Age: 58
End: 2023-10-27

## 2023-10-27 NOTE — TELEPHONE ENCOUNTER
Patients sister ids requesting a call regarding a new patient appointment and can be reached at [phone]. Okay to leave a message.

## 2023-11-03 ENCOUNTER — TELEPHONE (OUTPATIENT)
Dept: GASTROENTEROLOGY | Age: 58
End: 2023-11-03

## 2023-11-03 NOTE — TELEPHONE ENCOUNTER
Patients sister stated she is returning a missed call regarding patient and can be reached at [phone]. Okay to leave a message.

## 2023-11-08 ENCOUNTER — TELEPHONE (OUTPATIENT)
Dept: GASTROENTEROLOGY | Age: 58
End: 2023-11-08

## 2023-11-08 NOTE — TELEPHONE ENCOUNTER
Patient's sister Jeancarlos Cai called to get her scheduled for a colonoscopy. Sister states that if the office calls and she doesn't answer please leave a message.

## 2024-01-30 ENCOUNTER — OFFICE VISIT (OUTPATIENT)
Dept: FAMILY MEDICINE CLINIC | Age: 59
End: 2024-01-30
Payer: MEDICARE

## 2024-01-30 VITALS
SYSTOLIC BLOOD PRESSURE: 124 MMHG | HEIGHT: 64 IN | OXYGEN SATURATION: 100 % | BODY MASS INDEX: 29.5 KG/M2 | HEART RATE: 101 BPM | WEIGHT: 172.8 LBS | RESPIRATION RATE: 18 BRPM | DIASTOLIC BLOOD PRESSURE: 72 MMHG

## 2024-01-30 DIAGNOSIS — F32.A MILD DEPRESSION: ICD-10-CM

## 2024-01-30 DIAGNOSIS — F32.0 MAJOR DEPRESSIVE DISORDER, SINGLE EPISODE, MILD (HCC): ICD-10-CM

## 2024-01-30 DIAGNOSIS — M54.50 LUMBAR PAIN: Primary | ICD-10-CM

## 2024-01-30 PROCEDURE — 99214 OFFICE O/P EST MOD 30 MIN: CPT | Performed by: NURSE PRACTITIONER

## 2024-01-30 PROCEDURE — G8484 FLU IMMUNIZE NO ADMIN: HCPCS | Performed by: NURSE PRACTITIONER

## 2024-01-30 PROCEDURE — G8427 DOCREV CUR MEDS BY ELIG CLIN: HCPCS | Performed by: NURSE PRACTITIONER

## 2024-01-30 PROCEDURE — G8417 CALC BMI ABV UP PARAM F/U: HCPCS | Performed by: NURSE PRACTITIONER

## 2024-01-30 PROCEDURE — 1036F TOBACCO NON-USER: CPT | Performed by: NURSE PRACTITIONER

## 2024-01-30 PROCEDURE — 3017F COLORECTAL CA SCREEN DOC REV: CPT | Performed by: NURSE PRACTITIONER

## 2024-01-30 RX ORDER — LIDOCAINE 50 MG/G
1 PATCH TOPICAL DAILY
Qty: 10 PATCH | Refills: 0 | Status: SHIPPED | OUTPATIENT
Start: 2024-01-30 | End: 2024-02-09

## 2024-01-30 SDOH — ECONOMIC STABILITY: FOOD INSECURITY: WITHIN THE PAST 12 MONTHS, YOU WORRIED THAT YOUR FOOD WOULD RUN OUT BEFORE YOU GOT MONEY TO BUY MORE.: NEVER TRUE

## 2024-01-30 SDOH — ECONOMIC STABILITY: INCOME INSECURITY: HOW HARD IS IT FOR YOU TO PAY FOR THE VERY BASICS LIKE FOOD, HOUSING, MEDICAL CARE, AND HEATING?: NOT HARD AT ALL

## 2024-01-30 SDOH — ECONOMIC STABILITY: FOOD INSECURITY: WITHIN THE PAST 12 MONTHS, THE FOOD YOU BOUGHT JUST DIDN'T LAST AND YOU DIDN'T HAVE MONEY TO GET MORE.: NEVER TRUE

## 2024-01-30 SDOH — ECONOMIC STABILITY: HOUSING INSECURITY
IN THE LAST 12 MONTHS, WAS THERE A TIME WHEN YOU DID NOT HAVE A STEADY PLACE TO SLEEP OR SLEPT IN A SHELTER (INCLUDING NOW)?: NO

## 2024-01-30 ASSESSMENT — PATIENT HEALTH QUESTIONNAIRE - PHQ9
SUM OF ALL RESPONSES TO PHQ QUESTIONS 1-9: 3
SUM OF ALL RESPONSES TO PHQ QUESTIONS 1-9: 3
7. TROUBLE CONCENTRATING ON THINGS, SUCH AS READING THE NEWSPAPER OR WATCHING TELEVISION: 0
9. THOUGHTS THAT YOU WOULD BE BETTER OFF DEAD, OR OF HURTING YOURSELF: 0
SUM OF ALL RESPONSES TO PHQ9 QUESTIONS 1 & 2: 1
4. FEELING TIRED OR HAVING LITTLE ENERGY: 1
3. TROUBLE FALLING OR STAYING ASLEEP: 1
6. FEELING BAD ABOUT YOURSELF - OR THAT YOU ARE A FAILURE OR HAVE LET YOURSELF OR YOUR FAMILY DOWN: 0
5. POOR APPETITE OR OVEREATING: 0
10. IF YOU CHECKED OFF ANY PROBLEMS, HOW DIFFICULT HAVE THESE PROBLEMS MADE IT FOR YOU TO DO YOUR WORK, TAKE CARE OF THINGS AT HOME, OR GET ALONG WITH OTHER PEOPLE: 0
8. MOVING OR SPEAKING SO SLOWLY THAT OTHER PEOPLE COULD HAVE NOTICED. OR THE OPPOSITE, BEING SO FIGETY OR RESTLESS THAT YOU HAVE BEEN MOVING AROUND A LOT MORE THAN USUAL: 0
SUM OF ALL RESPONSES TO PHQ QUESTIONS 1-9: 3
SUM OF ALL RESPONSES TO PHQ QUESTIONS 1-9: 3
2. FEELING DOWN, DEPRESSED OR HOPELESS: 1
1. LITTLE INTEREST OR PLEASURE IN DOING THINGS: 0

## 2024-01-30 ASSESSMENT — ENCOUNTER SYMPTOMS
SHORTNESS OF BREATH: 0
COUGH: 0

## 2024-01-30 NOTE — PROGRESS NOTES
Musculoskeletal:         General: No deformity.      Cervical back: Full passive range of motion without pain and normal range of motion.      Lumbar back: Spasms present. No swelling or bony tenderness. Decreased range of motion (d/t pain).   Skin:     General: Skin is warm and dry.   Neurological:      Mental Status: She is alert and oriented to person, place, and time.            An electronic signature was used to authenticate this note.    --DEEPTI FUENTES - CNP

## 2024-02-02 ENCOUNTER — HOSPITAL ENCOUNTER (OUTPATIENT)
Facility: CLINIC | Age: 59
End: 2024-02-02
Payer: MEDICARE

## 2024-02-02 ENCOUNTER — HOSPITAL ENCOUNTER (OUTPATIENT)
Dept: GENERAL RADIOLOGY | Facility: CLINIC | Age: 59
End: 2024-02-02
Payer: MEDICARE

## 2024-02-02 DIAGNOSIS — M54.50 LUMBAR PAIN: ICD-10-CM

## 2024-02-02 PROCEDURE — 72100 X-RAY EXAM L-S SPINE 2/3 VWS: CPT

## 2024-02-05 ENCOUNTER — OFFICE VISIT (OUTPATIENT)
Age: 59
End: 2024-02-05
Payer: MEDICARE

## 2024-02-05 ENCOUNTER — TELEPHONE (OUTPATIENT)
Age: 59
End: 2024-02-05

## 2024-02-05 VITALS
TEMPERATURE: 98.1 F | OXYGEN SATURATION: 99 % | WEIGHT: 176.2 LBS | SYSTOLIC BLOOD PRESSURE: 107 MMHG | HEART RATE: 98 BPM | DIASTOLIC BLOOD PRESSURE: 66 MMHG | BODY MASS INDEX: 30.08 KG/M2 | HEIGHT: 64 IN

## 2024-02-05 DIAGNOSIS — R13.10 DYSPHAGIA, UNSPECIFIED TYPE: Primary | ICD-10-CM

## 2024-02-05 DIAGNOSIS — Z12.11 SCREENING FOR COLON CANCER: ICD-10-CM

## 2024-02-05 DIAGNOSIS — R19.4 ALTERED BOWEL HABITS: ICD-10-CM

## 2024-02-05 DIAGNOSIS — K21.9 GASTROESOPHAGEAL REFLUX DISEASE, UNSPECIFIED WHETHER ESOPHAGITIS PRESENT: ICD-10-CM

## 2024-02-05 PROCEDURE — 3017F COLORECTAL CA SCREEN DOC REV: CPT | Performed by: INTERNAL MEDICINE

## 2024-02-05 PROCEDURE — G8427 DOCREV CUR MEDS BY ELIG CLIN: HCPCS | Performed by: INTERNAL MEDICINE

## 2024-02-05 PROCEDURE — G8417 CALC BMI ABV UP PARAM F/U: HCPCS | Performed by: INTERNAL MEDICINE

## 2024-02-05 PROCEDURE — G8484 FLU IMMUNIZE NO ADMIN: HCPCS | Performed by: INTERNAL MEDICINE

## 2024-02-05 PROCEDURE — 1036F TOBACCO NON-USER: CPT | Performed by: INTERNAL MEDICINE

## 2024-02-05 PROCEDURE — 99204 OFFICE O/P NEW MOD 45 MIN: CPT | Performed by: INTERNAL MEDICINE

## 2024-02-05 RX ORDER — POLYETHYLENE GLYCOL 3350, SODIUM SULFATE ANHYDROUS, SODIUM BICARBONATE, SODIUM CHLORIDE, POTASSIUM CHLORIDE 236; 22.74; 6.74; 5.86; 2.97 G/4L; G/4L; G/4L; G/4L; G/4L
4 POWDER, FOR SOLUTION ORAL ONCE
Qty: 4000 ML | Refills: 0 | Status: SHIPPED | OUTPATIENT
Start: 2024-02-05 | End: 2024-02-05

## 2024-02-05 ASSESSMENT — ENCOUNTER SYMPTOMS
VOMITING: 0
DIARRHEA: 0
ANAL BLEEDING: 0
ABDOMINAL PAIN: 0
BLOOD IN STOOL: 0
CONSTIPATION: 0
NAUSEA: 0
TROUBLE SWALLOWING: 1

## 2024-02-05 NOTE — PROGRESS NOTES
forceps    HYSTERECTOMY (CERVIX STATUS UNKNOWN)      OH COLSC FLX W/REMOVAL LESION BY HOT BX FORCEPS N/A 9/21/2017    COLONOSCOPY POLYPECTOMY COLD BIOPSY performed by Doug Headley MD at [facility] OR    OH EGD TRANSORAL BIOPSY SINGLE/MULTIPLE N/A 9/21/2017    EGD BIOPSY performed by Doug Headley MD at [facility] OR    UPPER GASTROINTESTINAL ENDOSCOPY  09/21/2017    mild esophagitis       CURRENT MEDICATIONS:    Current Outpatient Medications:     sertraline (ZOLOFT) 50 MG tablet, Take 1 tablet by mouth daily, Disp: 30 tablet, Rfl: 5    lidocaine (LIDODERM) 5 %, Place 1 patch onto the skin daily for 10 days 12 hours on, 12 hours off., Disp: 10 patch, Rfl: 0    famotidine (PEPCID) 20 MG tablet, Take 1 tablet by mouth daily, Disp: 30 tablet, Rfl: 5    amitriptyline (ELAVIL) 50 MG tablet, TAKE ONE TABLET BY MOUTH ONCE NIGHTLY, Disp: 30 tablet, Rfl: 5    ibuprofen (ADVIL;MOTRIN) 800 MG tablet, Take 1 tablet by mouth 2 times daily as needed for Pain, Disp: 14 tablet, Rfl: 0    diclofenac (VOLTAREN) 75 MG EC tablet, TAKE ONE TABLET BY MOUTH TWICE A DAY WITH MEALS (Patient not taking: Reported on 9/26/2022), Disp: 28 tablet, Rfl: 0    omeprazole (PRILOSEC) 20 MG delayed release capsule, Take 1 capsule by mouth Daily (Patient not taking: Reported on 9/26/2022), Disp: 30 capsule, Rfl: 5    ALLERGIES:   No Known Allergies    FAMILY HISTORY:       Problem Relation Age of Onset    Cancer Mother         throat    Cancer Father         colon- advanced age    Cancer Sister         hodgkins    Colon Cancer Maternal Grandfather     Colon Cancer Maternal Aunt          SOCIAL HISTORY:   Social History     Socioeconomic History    Marital status: Single     Spouse name: Not on file    Number of children: Not on file    Years of education: Not on file    Highest education level: Not on file   Occupational History    Not on file   Tobacco Use    Smoking status: Never    Smokeless tobacco: Never   Substance and Sexual Activity    Alcohol use: No

## 2024-02-05 NOTE — TELEPHONE ENCOUNTER
Procedure scheduled    EGD/Colonoscopy (Screening)/[facility]  Dx: GERD, unspecified whether esophagitis present; Dysphagia, unspecified type; Altered bowel habits; Screening for colon cancer  Thursday 02/08/24 at 9:00 am/Arrive at 7:00 am  [facility]; Surgery Entrance, back of hospital    PAT Phone Call: In Person on Tuesday 02/06/24 at 3:00 PM  In person due to pt needing an  for Sign Language. Pt's sister Kiera states sister does better with in person  vs using a tablet    Adult scope per Dr. Crowell    EGD prep/Golytely bowel prep given to pt/pt's sister Kiera in office. Pt's sister Kiera instructed in office. Writer also got an updated Communication LUIZ signed/dated by pt.

## 2024-02-06 ENCOUNTER — HOSPITAL ENCOUNTER (OUTPATIENT)
Dept: PREADMISSION TESTING | Age: 59
Setting detail: OUTPATIENT SURGERY
Discharge: HOME | End: 2024-02-10
Payer: MEDICARE

## 2024-02-06 VITALS
TEMPERATURE: 100.2 F | WEIGHT: 176 LBS | HEIGHT: 66 IN | RESPIRATION RATE: 18 BRPM | BODY MASS INDEX: 28.28 KG/M2 | OXYGEN SATURATION: 96 % | HEART RATE: 98 BPM | DIASTOLIC BLOOD PRESSURE: 66 MMHG | SYSTOLIC BLOOD PRESSURE: 114 MMHG

## 2024-02-06 LAB
ANION GAP SERPL CALCULATED.3IONS-SCNC: 8 MMOL/L (ref 9–17)
BASOPHILS # BLD: 0 K/UL (ref 0–0.2)
BASOPHILS NFR BLD: 1 % (ref 0–2)
BUN SERPL-MCNC: 8 MG/DL (ref 6–20)
CALCIUM SERPL-MCNC: 10.4 MG/DL (ref 8.6–10.4)
CHLORIDE SERPL-SCNC: 98 MMOL/L (ref 98–107)
CO2 SERPL-SCNC: 32 MMOL/L (ref 20–31)
CREAT SERPL-MCNC: 0.7 MG/DL (ref 0.5–0.9)
EOSINOPHIL # BLD: 0.1 K/UL (ref 0–0.4)
EOSINOPHILS RELATIVE PERCENT: 3 % (ref 0–4)
ERYTHROCYTE [DISTWIDTH] IN BLOOD BY AUTOMATED COUNT: 11.8 % (ref 11.5–14.9)
GFR SERPL CREATININE-BSD FRML MDRD: >60 ML/MIN/1.73M2
GLUCOSE SERPL-MCNC: 90 MG/DL (ref 70–99)
HCT VFR BLD AUTO: 40.6 % (ref 36–46)
HGB BLD-MCNC: 13.6 G/DL (ref 12–16)
LYMPHOCYTES NFR BLD: 1.2 K/UL (ref 1–4.8)
LYMPHOCYTES RELATIVE PERCENT: 21 % (ref 24–44)
MCH RBC QN AUTO: 30.7 PG (ref 26–34)
MCHC RBC AUTO-ENTMCNC: 33.5 G/DL (ref 31–37)
MCV RBC AUTO: 91.7 FL (ref 80–100)
MONOCYTES NFR BLD: 0.3 K/UL (ref 0.1–1.3)
MONOCYTES NFR BLD: 6 % (ref 1–7)
NEUTROPHILS NFR BLD: 69 % (ref 36–66)
NEUTS SEG NFR BLD: 4 K/UL (ref 1.3–9.1)
PLATELET # BLD AUTO: 245 K/UL (ref 150–450)
PMV BLD AUTO: 7.2 FL (ref 6–12)
POTASSIUM SERPL-SCNC: 4.5 MMOL/L (ref 3.7–5.3)
RBC # BLD AUTO: 4.42 M/UL (ref 4–5.2)
SODIUM SERPL-SCNC: 138 MMOL/L (ref 135–144)
WBC OTHER # BLD: 5.7 K/UL (ref 3.5–11)

## 2024-02-06 PROCEDURE — 80048 BASIC METABOLIC PNL TOTAL CA: CPT

## 2024-02-06 PROCEDURE — 85025 COMPLETE CBC W/AUTO DIFF WBC: CPT

## 2024-02-06 PROCEDURE — 36415 COLL VENOUS BLD VENIPUNCTURE: CPT

## 2024-02-06 NOTE — DISCHARGE INSTRUCTIONS
Pre-op Instructions For Out-Patient Endoscopy Surgery    Medication Instructions:  Please stop herbs and any supplements now (includes vitamins and minerals).    Please contact your surgeon and prescribing physician for pre-op instructions for any blood thinners.    If you have inhalers/aerosol treatments at home, please use them the morning of your surgery and bring the inhalers with you to the hospital.    Please take the following medications the morning of your surgery with a sip of water:    None    Surgery Instructions:  After midnight before surgery:  Do not eat or drink anything, including water, mints, gum, and hard candy.  You may brush your teeth without swallowing.  No smoking, chewing tobacco, or street drugs.    Please shower or bathe before surgery.       Please do not wear any cologne, lotion, powder, jewelry, piercings, perfume, makeup, nail polish, hair accessories, or hair spray on the day of surgery.  Wear loose comfortable clothing.    Leave your valuables at home but bring a payment source for any after-surgery prescriptions you plan to fill at [facility].  Bring a storage case for any glasses/contacts.    An adult who is responsible for you MUST drive you home and should be with you for the first 24 hours after surgery.     The Day of Surgery:  Arrive at [facility] Surgery Entrance at the time directed by your surgeon and check in at the desk.     If you have a living will or healthcare power of , please bring a copy.    You will be taken to the pre-op holding area where you will be prepared for surgery.  A physical assessment will be performed by a nurse practitioner or house officer.  Your IV will be started and you will meet your anesthesiologist.    When you go to surgery, your family will be directed to the surgical waiting room, where the doctor should speak with them after your surgery.    After surgery, you will be taken to the recovery area.  When you

## 2024-02-06 NOTE — PROGRESS NOTES
Patient arrived with her sister, Kiera Navas.  Patient wants her sister to interpret for her and refuses the  service from the hospital. Lakshmi #[id number] from the hospital  service interpreted the \"Waiver of  Service\" form to patient and that she and her sister had to sign the form.  \"Appendix A - Waiver of  Services\" form was signed by the patient and her sister, Kiera Navas and myself and placed into the chart.

## 2024-02-07 ENCOUNTER — ANESTHESIA EVENT (OUTPATIENT)
Dept: ENDOSCOPY | Age: 59
End: 2024-02-07
Payer: MEDICARE

## 2024-02-08 ENCOUNTER — ANESTHESIA (OUTPATIENT)
Dept: ENDOSCOPY | Age: 59
End: 2024-02-08
Payer: MEDICARE

## 2024-02-08 ENCOUNTER — HOSPITAL ENCOUNTER (OUTPATIENT)
Age: 59
Setting detail: OUTPATIENT SURGERY
Discharge: HOME | End: 2024-02-08
Attending: INTERNAL MEDICINE | Admitting: INTERNAL MEDICINE
Payer: MEDICARE

## 2024-02-08 VITALS
HEART RATE: 88 BPM | SYSTOLIC BLOOD PRESSURE: 117 MMHG | BODY MASS INDEX: 28.28 KG/M2 | RESPIRATION RATE: 16 BRPM | WEIGHT: 176 LBS | TEMPERATURE: 97.7 F | OXYGEN SATURATION: 99 % | DIASTOLIC BLOOD PRESSURE: 74 MMHG | HEIGHT: 66 IN

## 2024-02-08 DIAGNOSIS — K21.9 GASTROESOPHAGEAL REFLUX DISEASE, UNSPECIFIED WHETHER ESOPHAGITIS PRESENT: ICD-10-CM

## 2024-02-08 DIAGNOSIS — R13.10 DYSPHAGIA, UNSPECIFIED TYPE: ICD-10-CM

## 2024-02-08 DIAGNOSIS — R19.4 ALTERED BOWEL HABITS: ICD-10-CM

## 2024-02-08 DIAGNOSIS — Z12.11 SCREEN FOR COLON CANCER: ICD-10-CM

## 2024-02-08 PROCEDURE — 7100000010 HC PHASE II RECOVERY - FIRST 15 MIN: Performed by: INTERNAL MEDICINE

## 2024-02-08 PROCEDURE — 3609012400 HC EGD TRANSORAL BIOPSY SINGLE/MULTIPLE: Performed by: INTERNAL MEDICINE

## 2024-02-08 PROCEDURE — 45385 COLONOSCOPY W/LESION REMOVAL: CPT | Performed by: INTERNAL MEDICINE

## 2024-02-08 PROCEDURE — 88305 TISSUE EXAM BY PATHOLOGIST: CPT

## 2024-02-08 PROCEDURE — 3700000000 HC ANESTHESIA ATTENDED CARE: Performed by: INTERNAL MEDICINE

## 2024-02-08 PROCEDURE — 2500000003 HC RX 250 WO HCPCS: Performed by: NURSE ANESTHETIST, CERTIFIED REGISTERED

## 2024-02-08 PROCEDURE — 2709999900 HC NON-CHARGEABLE SUPPLY: Performed by: INTERNAL MEDICINE

## 2024-02-08 PROCEDURE — 43251 EGD REMOVE LESION SNARE: CPT | Performed by: INTERNAL MEDICINE

## 2024-02-08 PROCEDURE — 2500000003 HC RX 250 WO HCPCS: Performed by: ANESTHESIOLOGY

## 2024-02-08 PROCEDURE — 7100000011 HC PHASE II RECOVERY - ADDTL 15 MIN: Performed by: INTERNAL MEDICINE

## 2024-02-08 PROCEDURE — 2580000003 HC RX 258: Performed by: ANESTHESIOLOGY

## 2024-02-08 PROCEDURE — 43239 EGD BIOPSY SINGLE/MULTIPLE: CPT | Performed by: INTERNAL MEDICINE

## 2024-02-08 PROCEDURE — 3609010600 HC COLONOSCOPY POLYPECTOMY SNARE/COLD BIOPSY: Performed by: INTERNAL MEDICINE

## 2024-02-08 PROCEDURE — 6360000002 HC RX W HCPCS: Performed by: NURSE ANESTHETIST, CERTIFIED REGISTERED

## 2024-02-08 PROCEDURE — 3700000001 HC ADD 15 MINUTES (ANESTHESIA): Performed by: INTERNAL MEDICINE

## 2024-02-08 RX ORDER — PROPOFOL 10 MG/ML
INJECTION, EMULSION INTRAVENOUS CONTINUOUS PRN
Status: DISCONTINUED | OUTPATIENT
Start: 2024-02-08 | End: 2024-02-08 | Stop reason: SDUPTHER

## 2024-02-08 RX ORDER — SODIUM CHLORIDE 9 MG/ML
INJECTION, SOLUTION INTRAVENOUS PRN
Status: DISCONTINUED | OUTPATIENT
Start: 2024-02-08 | End: 2024-02-08 | Stop reason: HOSPADM

## 2024-02-08 RX ORDER — SODIUM CHLORIDE 0.9 % (FLUSH) 0.9 %
5-40 SYRINGE (ML) INJECTION EVERY 12 HOURS SCHEDULED
Status: DISCONTINUED | OUTPATIENT
Start: 2024-02-08 | End: 2024-02-08 | Stop reason: HOSPADM

## 2024-02-08 RX ORDER — LIDOCAINE HYDROCHLORIDE 20 MG/ML
INJECTION, SOLUTION EPIDURAL; INFILTRATION; INTRACAUDAL; PERINEURAL PRN
Status: DISCONTINUED | OUTPATIENT
Start: 2024-02-08 | End: 2024-02-08 | Stop reason: SDUPTHER

## 2024-02-08 RX ORDER — PROPOFOL 10 MG/ML
INJECTION, EMULSION INTRAVENOUS PRN
Status: DISCONTINUED | OUTPATIENT
Start: 2024-02-08 | End: 2024-02-08 | Stop reason: SDUPTHER

## 2024-02-08 RX ORDER — LIDOCAINE HYDROCHLORIDE 10 MG/ML
1 INJECTION, SOLUTION EPIDURAL; INFILTRATION; INTRACAUDAL; PERINEURAL
Status: COMPLETED | OUTPATIENT
Start: 2024-02-08 | End: 2024-02-08

## 2024-02-08 RX ORDER — MIDAZOLAM HYDROCHLORIDE 1 MG/ML
INJECTION INTRAMUSCULAR; INTRAVENOUS PRN
Status: DISCONTINUED | OUTPATIENT
Start: 2024-02-08 | End: 2024-02-08 | Stop reason: SDUPTHER

## 2024-02-08 RX ORDER — SODIUM CHLORIDE, SODIUM LACTATE, POTASSIUM CHLORIDE, CALCIUM CHLORIDE 600; 310; 30; 20 MG/100ML; MG/100ML; MG/100ML; MG/100ML
INJECTION, SOLUTION INTRAVENOUS CONTINUOUS
Status: DISCONTINUED | OUTPATIENT
Start: 2024-02-08 | End: 2024-02-08 | Stop reason: HOSPADM

## 2024-02-08 RX ORDER — SODIUM CHLORIDE 0.9 % (FLUSH) 0.9 %
5-40 SYRINGE (ML) INJECTION PRN
Status: DISCONTINUED | OUTPATIENT
Start: 2024-02-08 | End: 2024-02-08 | Stop reason: HOSPADM

## 2024-02-08 RX ADMIN — PROPOFOL 50 MG: 10 INJECTION, EMULSION INTRAVENOUS at 09:22

## 2024-02-08 RX ADMIN — PROPOFOL 100 MCG/KG/MIN: 10 INJECTION, EMULSION INTRAVENOUS at 08:38

## 2024-02-08 RX ADMIN — SODIUM CHLORIDE, POTASSIUM CHLORIDE, SODIUM LACTATE AND CALCIUM CHLORIDE: 600; 310; 30; 20 INJECTION, SOLUTION INTRAVENOUS at 08:03

## 2024-02-08 RX ADMIN — PROPOFOL 30 MG: 10 INJECTION, EMULSION INTRAVENOUS at 08:48

## 2024-02-08 RX ADMIN — MIDAZOLAM 2 MG: 1 INJECTION INTRAMUSCULAR; INTRAVENOUS at 08:38

## 2024-02-08 RX ADMIN — LIDOCAINE HYDROCHLORIDE 80 MG: 20 INJECTION, SOLUTION EPIDURAL; INFILTRATION; INTRACAUDAL; PERINEURAL at 08:38

## 2024-02-08 RX ADMIN — PROPOFOL 70 MG: 10 INJECTION, EMULSION INTRAVENOUS at 08:38

## 2024-02-08 RX ADMIN — PROPOFOL 50 MG: 10 INJECTION, EMULSION INTRAVENOUS at 09:08

## 2024-02-08 RX ADMIN — LIDOCAINE HYDROCHLORIDE 1 ML: 10 INJECTION, SOLUTION EPIDURAL; INFILTRATION; INTRACAUDAL; PERINEURAL at 08:03

## 2024-02-08 RX ADMIN — PROPOFOL 50 MG: 10 INJECTION, EMULSION INTRAVENOUS at 08:58

## 2024-02-08 ASSESSMENT — ENCOUNTER SYMPTOMS
SHORTNESS OF BREATH: 0
SINUS PRESSURE: 0
ABDOMINAL PAIN: 0
NAUSEA: 0

## 2024-02-08 ASSESSMENT — PAIN - FUNCTIONAL ASSESSMENT
PAIN_FUNCTIONAL_ASSESSMENT: 0-10
PAIN_FUNCTIONAL_ASSESSMENT: WONG-BAKER FACES

## 2024-02-08 NOTE — ANESTHESIA POSTPROCEDURE EVALUATION
Department of Anesthesiology  Postprocedure Note    Patient: Berna Sutton  MRN: [id number]  YOB: 1965  Date of evaluation: 2/8/2024    Procedure Summary       Date: 02/08/24 Room / Location: [place] [address] / [facility]    Anesthesia Start: 0836 Anesthesia Stop: 0943    Procedures:       EGD BIOPSY AND COLD SNARE (Esophagus)      COLONOSCOPY POLYPECTOMY SNARE/COLD BIOPSY Diagnosis:       Gastroesophageal reflux disease, unspecified whether esophagitis present      Dysphagia, unspecified type      Altered bowel habits      Screen for colon cancer      (Gastroesophageal reflux disease, unspecified whether esophagitis present [K21.9])      (Dysphagia, unspecified type [R13.10])      (Altered bowel habits [R19.4])      (Screen for colon cancer [Z12.11])    Surgeons: Narinder Crowell MD Responsible Provider: Rama Conley MD    Anesthesia Type: TIVA ASA Status: 2            Anesthesia Type: TIVA    Je Phase I:      Je Phase II: Je Score: 10    Anesthesia Post Evaluation    Comments: POST- ANESTHESIA EVALUATION       Pt Name: Berna Sutton  MRN: [id number]  YOB: 1965  Date of evaluation: 2/8/2024  Time:  10:31 AM      /74   Pulse 88   Temp 97.7 °F (36.5 °C)   Resp 16   Ht 1.676 m (5' 6\")   Wt 79.8 kg (176 lb)   SpO2 99%   BMI 28.41 kg/m²      Consciousness Level  Awake  Cardiopulmonary Status  Stable  Pain Adequately Treated YES  Nausea / Vomiting  NO  Adequate Hydration  YES  Anesthesia Related Complications NONE      Electronically signed by Rama Conley MD on 2/8/2024 at 10:31 AM      No notable events documented.

## 2024-02-08 NOTE — H&P
HISTORY and PHYSICAL  [facility]       NAME:  Berna Sutton  MRN: [id number]   YOB: 1965   Date: 2/8/2024   Age: 59 y.o.  Gender: female       COMPLAINT AND PRESENT HISTORY:       Berna Sutton is 59 y.o.,  female, will be having a   EGD ESOPHAGOGASTRODUODENOSCOPY, COLONOSCOPY.       Pt is being seen for hx of : Pre-Op Diagnosis Codes:     * Gastroesophageal reflux disease, unspecified whether esophagitis present      * Dysphagia, unspecified type     * Altered bowel habits      * Screen for colon cancer   Patient is deaf and uses sign language.    Per PAT RN:  Patient arrived with her sister, Kiera Navas.  Patient wants her sister to interpret for her and refuses the  service from the hospital. Lakshmi #[id number] from the hospital  service interpreted the \"Waiver of  Service\" form to patient and that she and her sister had to sign the form.  \"Appendix A - Waiver of  Services\" form was signed by the patient and her sister, Kiera Navas and myself and placed into the chart.     Patient's sister (Kiera )is present to represent and perform sign language        Prior Colonoscopy  and EGD was done last in 2017.   Patient has hx of Colon Polyps.   Pt  denies any   abdominal pain including bloating/gas. Denies any nausea or vomiting.       Pt admits to some changes in bowel habits. Reports that she is going more with more volume of stools and said to be solid.   No diarrhea, constipation, blood in stools, black tarry stools.    No changes in appetite and unintended weight loss.     Pt admit to continued  heartburn, indigestion,  acid reflux. With some discomfort and frequent burping   Pt is taking Pepcid.      Pt admit to having some dysphagia with some mild discomfort.     Pt has positive Family Hx of colon cancer in her maternal grandfather and aunt.  No FH of esophageal cancer.     Medical history reviewed:  Denies current chest          hodgkins    Colon Cancer Maternal Aunt     Colon Cancer Maternal Grandfather        SOCIAL HISTORY       Social History     Socioeconomic History    Marital status: Single   Tobacco Use    Smoking status: Never    Smokeless tobacco: Never   Vaping Use    Vaping Use: Never used   Substance and Sexual Activity    Alcohol use: No    Drug use: No     Social Determinants of Health     Financial Resource Strain: Low Risk  (1/30/2024)    Overall Financial Resource Strain (CARDIA)     Difficulty of Paying Living Expenses: Not hard at all   Food Insecurity: No Food Insecurity (1/30/2024)    Hunger Vital Sign     Worried About Running Out of Food in the Last Year: Never true     Ran Out of Food in the Last Year: Never true   Transportation Needs: Unknown (1/30/2024)    PRAPARE - Transportation     Lack of Transportation (Non-Medical): No   Physical Activity: Insufficiently Active (6/6/2022)    Exercise Vital Sign     Days of Exercise per Week: 2 days     Minutes of Exercise per Session: 10 min   Housing Stability: Unknown (1/30/2024)    Housing Stability Vital Sign     Unstable Housing in the Last Year: No           REVIEW OF SYSTEMS      No Known Allergies    No current facility-administered medications on file prior to encounter.     Current Outpatient Medications on File Prior to Encounter   Medication Sig Dispense Refill    methylcellulose (CITRUCEL) oral powder Take 2 g by mouth daily Take by mouth daily. 180 g 1    sertraline (ZOLOFT) 50 MG tablet Take 1 tablet by mouth daily 30 tablet 5    lidocaine (LIDODERM) 5 % Place 1 patch onto the skin daily for 10 days 12 hours on, 12 hours off. 10 patch 0    famotidine (PEPCID) 20 MG tablet Take 1 tablet by mouth daily 30 tablet 5    amitriptyline (ELAVIL) 50 MG tablet TAKE ONE TABLET BY MOUTH ONCE NIGHTLY 30 tablet 5     Notation: Above medications are not currently reconciled at time of signing this H&P note, to be reconciled in pre-op per RN.     Review of Systems

## 2024-02-08 NOTE — OP NOTE
Colonoscopy report    Colonoscopy Procedure Note    Procedure:  Colonoscopy with snare polypectomy    Procedure Date: 2/8/2024    Indications:  Screening, altered bowel habits    Sedation:  MAC    Attending Physician:  Dr. Narinder Crowell MD.     Assistant Physician: None    Consent:   Informed consent was obtained for the procedure after explaining the risks including bleeding, perforation, aspiration, arrhythmia, risks related to sedation, reaction to medications and rarely death, benefits and alternatives to the patient. The patient verbalized understanding and agreed to proceed with the procedure.       Procedure Details:  The patient was placed in the left lateral decubitus position.  Oxygen and cardiac monitoring equipment was attached. The patient's vital signs were monitored continuously  throughout the procedure. After appropriate sedation was achieved, a rectal examination was performed.  The pediatric colonoscope was inserted into the rectum and advanced under direct vision to the cecum which was identified by ileocecal valve and appendiceal orifice.  The quality of the colonic preparation was fair in 90% of the colonic segment and poor in the cecum.  A careful inspection was made as the colonoscope was withdrawn, including a retroflexed view of the rectum; findings and interventions are described below.  Appropriate photodocumentation was obtained.           Complications:  None    Estimated blood loss:  Minimal           Disposition:  Home           Condition: stable    Withdrawal Time: 10 minutes    Findings:   The bowel prep was fair in 90% of the colonic segment, it was poor with solid stool in the cecum.  One 4 to 5 mm polyp noted in the descending colon that was removed with cold snare.  Few scattered diverticula were noted in the sigmoid colon.  Retroflexion examined the rectum with small internal/external hemorrhoids.    Specimen Removed: Colon polyp    Endoscopic Impression:    Fair bowel prep,  poor in the cecum  4 to 5 mm polyp in descending colon that was removed with cold snare.  Diverticulosis.  Small internal/external hemorrhoids    Recommendations:  Follow pathology results.  Recommend repeat colonoscopy in 2 to 3 years with 2-day bowel prep.  Consider using an adult scope.  Continue Citrucel, may consider adding MiraLAX as well.    Attending Attestation: I performed the procedure.    Narinder Crowell MD

## 2024-02-08 NOTE — OP NOTE
EGD report    Esophagogastroduodenoscopy (EGD) Procedure Note    Procedure:  EGD with Biopsies    Procedure Date: 2/8/2024    Indications:  Dysphagia, altered bowel habits, GERD    Sedation:  MAC    Attending Physician:  Dr. Narinder Crowell MD    Assistant:  None    Procedure Details:    Informed consent was obtained for the procedure, including sedation. Risks of infection, perforation, hemorrhage, adverse drug reaction, and aspiration were discussed. The patient was placed in the left lateral decubitus position. The patient was monitored continuously with ECG tracing, pulse oximetry, blood pressure monitoring, and direct observation.      The gastroscope was inserted into the mouth and advanced under direct vision to second portion of the duodenum.  A careful inspection was made as the gastroscope was withdrawn, including a retroflexed view of the proximal stomach; findings and interventions are described below. Appropriate photodocumentation was obtained.    Findings:  Retropharyngeal area was grossly normal appearing     Esophagus: normal                          Esophagogastric markings: Diaphragmatic hiatus- 38 cm; GE junction- 38 cm, and isolated salmon-colored mucosal patch noted above the GE junction, this was biopsied to rule out Marcos's esophagus.  This was sent along with distal esophageal biopsies to rule out EOE and GERD. Proximal esophageal biopsies also obtained.     Stomach:  Multiple benign-appearing polyps noted in the fundus and body, 5 of these were sampled with cold snare and retrieved.  Size ranging between 5 to 6 mm.    Antrum: Linear patchy erythema noted converging onto the pylorus, biopsies obtained to rule out H. pylori     Duodenum:     Bulb: normal    First part: Normal    Second Part: Normal  Biopsies obtained to rule out celiac disease    Complications:  None           Estimated blood loss:  Minimal    Disposition:  Home           Condition: stable    Specimen Removed: Proximal and  distal esophagus, gastric polyps, stomach biopsies, duodenum    Impression:    An isolated salmon-colored mucosal island noted above the GE junction, this was biopsied to rule out Marcos's esophagus.  Proximal and distal esophageal biopsies obtained to rule out EOE and GERD.  Multiple polyps ranging between 5 to 6 mm noted in the stomach fundus and body, 5 of these were removed with cold snare.  Linear erythematous gastropathy, biopsies obtained.  Biopsies from the small bowel obtained to rule out celiac disease.    Recommendations:  Follow pathology results.  Continue pepcid for now  Proceed with colonoscopy today for further evaluation  Follow-up in clinic    Attending Attestation: I performed the procedure.    Narinder Crowell MD

## 2024-02-08 NOTE — ANESTHESIA PRE PROCEDURE
08:16 PM    HGB 13.6 02/06/2024 04:00 PM    HCT 40.6 02/06/2024 04:00 PM    MCV 91.7 02/06/2024 04:00 PM    RDW 11.8 02/06/2024 04:00 PM     02/06/2024 04:00 PM     01/10/2012 08:16 PM       CMP:   Lab Results   Component Value Date/Time     02/06/2024 04:00 PM    K 4.5 02/06/2024 04:00 PM    CL 98 02/06/2024 04:00 PM    CO2 32 02/06/2024 04:00 PM    BUN 8 02/06/2024 04:00 PM    CREATININE 0.7 02/06/2024 04:00 PM    GFRAA >60 09/26/2022 04:20 PM    AGRATIO 1.6 02/07/2022 08:40 AM    LABGLOM >60 02/06/2024 04:00 PM    GLUCOSE 90 02/06/2024 04:00 PM    GLUCOSE 92 01/10/2012 08:16 PM    PROT 6.8 02/07/2022 08:40 AM    CALCIUM 10.4 02/06/2024 04:00 PM    BILITOT 0.32 02/07/2022 08:40 AM    ALKPHOS 112 02/07/2022 08:40 AM    AST 22 02/07/2022 08:40 AM    ALT 19 02/07/2022 08:40 AM       POC Tests: No results for input(s): \"POCGLU\", \"POCNA\", \"POCK\", \"POCCL\", \"POCBUN\", \"POCHEMO\", \"POCHCT\" in the last 72 hours.    Coags: No results found for: \"PROTIME\", \"INR\", \"APTT\"    HCG (If Applicable): No results found for: \"PREGTESTUR\", \"PREGSERUM\", \"HCG\", \"HCGQUANT\"     ABGs: No results found for: \"PHART\", \"PO2ART\", \"RTY1PRT\", \"RVG6KJE\", \"BEART\", \"W3UJDCRW\"     Type & Screen (If Applicable):  No results found for: \"LABABO\", \"LABRH\"    Drug/Infectious Status (If Applicable):  Lab Results   Component Value Date/Time    HEPCAB NONREACTIVE 03/21/2017 08:12 PM       COVID-19 Screening (If Applicable): No results found for: \"COVID19\"        Anesthesia Evaluation  Patient summary reviewed and Nursing notes reviewed   no history of anesthetic complications:   Airway: Mallampati: II  TM distance: >3 FB   Neck ROM: full  Mouth opening: > = 3 FB   Dental:    (+) poor dentition  Comment: Many missing and broken teeth    Pulmonary:normal exam  breath sounds clear to auscultation  (+)           asthma:                            Cardiovascular:Negative CV ROS  Exercise tolerance: good (>4 METS)          Rhythm: regular  Rate:

## 2024-02-08 NOTE — DISCHARGE INSTR - DIET

## 2024-02-12 ENCOUNTER — TELEPHONE (OUTPATIENT)
Dept: GASTROENTEROLOGY | Age: 59
End: 2024-02-12

## 2024-02-12 DIAGNOSIS — R12 HEARTBURN: ICD-10-CM

## 2024-02-12 LAB — SURGICAL PATHOLOGY REPORT: NORMAL

## 2024-02-12 RX ORDER — FAMOTIDINE 20 MG/1
20 TABLET, FILM COATED ORAL DAILY
Qty: 90 TABLET | Refills: 1 | Status: SHIPPED | OUTPATIENT
Start: 2024-02-12

## 2024-02-12 RX ORDER — POLYETHYLENE GLYCOL 3350 17 G/17G
17 POWDER, FOR SOLUTION ORAL DAILY
Qty: 1530 G | Refills: 1 | Status: SHIPPED | OUTPATIENT
Start: 2024-02-12 | End: 2024-03-13

## 2024-02-12 NOTE — TELEPHONE ENCOUNTER
Patients sister is requesting a call regarding patient being constipated and can be reached at [phone]. Okay to leave a message.

## 2024-02-12 NOTE — TELEPHONE ENCOUNTER
Last visit: 01/30/2024  Last Med refill: 11/16/2023  Does patient have enough medication for 72 hours: No:     Next Visit Date:  Future Appointments   Date Time Provider Department Center   4/29/2024 11:00 AM [facility]   5[facility]28[facility]2024  9[facility]       Health Maintenance   Topic Date Due    Hepatitis B vaccine (1 of 3 - 3-dose series) Never done    HIV screen  Never done    DTaP/Tdap/Td vaccine (1 - Tdap) Never done    Shingles vaccine (1 of 2) Never done    Flu vaccine (1) 08/01/2023    COVID-19 Vaccine (3 - 2023-24 season) 09/01/2023    Annual Wellness Visit (Medicare)  08/15/2024    Depression Monitoring  01/30/2025    Breast cancer screen  08/10/2025    Lipids  02/07/2027    Colorectal Cancer Screen  02/08/2029    Hepatitis C screen  Completed    Hepatitis A vaccine  Aged Out    Hib vaccine  Aged Out    Polio vaccine  Aged Out    Meningococcal (ACWY) vaccine  Aged Out    Pneumococcal 0-64 years Vaccine  Aged Out    Diabetes screen  Discontinued       Hemoglobin A1C (%)   Date Value   01/29/2021 5.3   03/20/2017 5.5             ( goal A1C is < 7)   No components found for: \"LABMICR\"  LDL Cholesterol (mg/dL)   Date Value   02/07/2022 101   01/29/2021 100       (goal LDL is <100)   AST (U/L)   Date Value   02/07/2022 22     ALT (U/L)   Date Value   02/07/2022 19     BUN (mg/dL)   Date Value   02/06/2024 8     BP Readings from Last 3 Encounters:   02/06/24 114/66   02/08/24 117/74   02/05/24 107/66          (goal 120/80)    All Future Testing planned in [facility]  Lab Frequency Next Occurrence   XR LUMBAR SPINE (MIN 4 VIEWS) Once 01/30/2024   EGD Routine Once 02/12/2024   COLONOSCOPY (Screening) Once 02/19/2024   XR ABDOMEN (2 VIEWS) Once 02/05/2024               Patient Active Problem List:     Deaf     GERD (gastroesophageal reflux disease)     Polyp of colon     Gastroesophageal reflux disease with esophagitis     Major depressive

## 2024-02-13 DIAGNOSIS — M47.816 ARTHRITIS OF LUMBAR SPINE: ICD-10-CM

## 2024-02-13 DIAGNOSIS — M54.50 LUMBAR PAIN: Primary | ICD-10-CM

## 2024-02-13 RX ORDER — BISACODYL 5 MG/1
5 TABLET, DELAYED RELEASE ORAL
Qty: 90 TABLET | Refills: 1 | Status: SHIPPED | OUTPATIENT
Start: 2024-02-13 | End: 2024-05-13

## 2024-02-13 NOTE — TELEPHONE ENCOUNTER
Patient's sister bought some Mirelax a few days ago and she has been taking it daily and still no bowel movement.

## 2024-03-08 DIAGNOSIS — G89.29 CHRONIC INTRACTABLE HEADACHE, UNSPECIFIED HEADACHE TYPE: ICD-10-CM

## 2024-03-08 DIAGNOSIS — R51.9 CHRONIC INTRACTABLE HEADACHE, UNSPECIFIED HEADACHE TYPE: ICD-10-CM

## 2024-03-08 RX ORDER — AMITRIPTYLINE HYDROCHLORIDE 50 MG/1
TABLET, FILM COATED ORAL
Qty: 30 TABLET | Refills: 5 | Status: SHIPPED | OUTPATIENT
Start: 2024-03-08

## 2024-03-08 NOTE — TELEPHONE ENCOUNTER
Last visit: 01/30/2024  Last Med refill: 02/07/2024  Does patient have enough medication for 72 hours: Yes    Next Visit Date:  Future Appointments   Date Time Provider Department Center   3/29/2024  9:15 AM [facility]   3/29/2024  9:15 [facility]       Health Maintenance   Topic Date Due    Hepatitis B vaccine (1 of 3 - 3-dose series) Never done    HIV screen  Never done    DTaP/Tdap/Td vaccine (1 - Tdap) Never done    Shingles vaccine (1 of 2) Never done    Flu vaccine (1) 08/01/2023    COVID-19 Vaccine (3 - 2023-24 season) 09/01/2023    Annual Wellness Visit (Medicare)  08/15/2024    Depression Monitoring  01/30/2025    Breast cancer screen  08/10/2025    Lipids  02/07/2027    Colorectal Cancer Screen  02/08/2029    Hepatitis C screen  Completed    Hepatitis A vaccine  Aged Out    Hib vaccine  Aged Out    Polio vaccine  Aged Out    Meningococcal (ACWY) vaccine  Aged Out    Pneumococcal 0-64 years Vaccine  Aged Out    Diabetes screen  Discontinued       Hemoglobin A1C (%)   Date Value   01/29/2021 5.3   03/20/2017 5.5             ( goal A1C is < 7)   No components found for: \"LABMICR\"  LDL Cholesterol (mg/dL)   Date Value   02/07/2022 101   01/29/2021 100       (goal LDL is <100)   AST (U/L)   Date Value   02/07/2022 22     ALT (U/L)   Date Value   02/07/2022 19     BUN (mg/dL)   Date Value   02/06/2024 8     BP Readings from Last 3 Encounters:   02/06/24 114/66   02/08/24 117/74   02/05/24 107/66          (goal 120/80)    All Future Testing planned in [facility]  Lab Frequency Next Occurrence   XR LUMBAR SPINE (MIN 4 VIEWS) Once 01/30/2024   EGD Routine Once 02/12/2024   COLONOSCOPY (Screening) Once 02/19/2024   XR ABDOMEN (2 VIEWS) Once 02/05/2024               Patient Active Problem List:     Deaf     GERD (gastroesophageal reflux disease)     Polyp of colon     Gastroesophageal reflux disease with esophagitis     Major depressive

## 2024-03-09 PROBLEM — Z12.11 SCREEN FOR COLON CANCER: Status: RESOLVED | Noted: 2024-02-08 | Resolved: 2024-03-09

## 2024-03-29 ENCOUNTER — TELEPHONE (OUTPATIENT)
Dept: FAMILY MEDICINE CLINIC | Age: 59
End: 2024-03-29

## 2024-03-29 NOTE — TELEPHONE ENCOUNTER
----- Message from Abby Amaya sent at 3/28/2024  2:52 PM EDT -----   services need to be requested for:   Berna Sutton  5/20/2024 at 7:30 AM   Needed: Sign Language  Mary Turk

## 2024-03-29 NOTE — TELEPHONE ENCOUNTER
----- Message from Abby Amaya sent at 3/28/2024  2:56 PM EDT -----  Subject: Message to Provider    QUESTIONS  Information for Provider? Patient needed to cancel her appt on 3/29.   Please also cancel  services for that appt. Patient is   rescheduled to 5/20/24 which was first available that met her sister's   schedule. Please arrange for  services at the new appt. Also,   is there any way she can be seen sooner to keep her routine visits on   schedule? Her sister (who provideds transportation) ideally needs a Mon or   Fri or late afternoon (after 3pm). Please call Kiera to advise/reschedule   as you see fit.   ---------------------------------------------------------------------------  --------------  CALL BACK INFO  [phone]; OK to leave message on voicemail  ---------------------------------------------------------------------------  --------------  SCRIPT ANSWERS  Relationship to Patient? Other/Third Party  Representative Name? Kiera (Sister)  Is the representative on the Communication Release of Information (LUIZ)   form in Epic? Yes

## 2024-03-29 NOTE — TELEPHONE ENCOUNTER
TUNDE Qureshi to call the office.  Previous message states they want patient to keep her routine visits on schedule. Patient is scheduled for the next available as a 3 month f/u.

## 2024-05-20 ENCOUNTER — OFFICE VISIT (OUTPATIENT)
Dept: FAMILY MEDICINE CLINIC | Age: 59
End: 2024-05-20
Payer: MEDICARE

## 2024-05-20 VITALS
HEART RATE: 92 BPM | BODY MASS INDEX: 28.89 KG/M2 | WEIGHT: 179 LBS | DIASTOLIC BLOOD PRESSURE: 64 MMHG | SYSTOLIC BLOOD PRESSURE: 110 MMHG | OXYGEN SATURATION: 97 %

## 2024-05-20 DIAGNOSIS — M54.9 UPPER BACK PAIN: ICD-10-CM

## 2024-05-20 DIAGNOSIS — M25.552 LEFT HIP PAIN: Primary | ICD-10-CM

## 2024-05-20 DIAGNOSIS — M54.50 LUMBAR PAIN: ICD-10-CM

## 2024-05-20 DIAGNOSIS — F32.A MILD DEPRESSION: ICD-10-CM

## 2024-05-20 PROCEDURE — 1036F TOBACCO NON-USER: CPT | Performed by: NURSE PRACTITIONER

## 2024-05-20 PROCEDURE — 3017F COLORECTAL CA SCREEN DOC REV: CPT | Performed by: NURSE PRACTITIONER

## 2024-05-20 PROCEDURE — G8417 CALC BMI ABV UP PARAM F/U: HCPCS | Performed by: NURSE PRACTITIONER

## 2024-05-20 PROCEDURE — G8427 DOCREV CUR MEDS BY ELIG CLIN: HCPCS | Performed by: NURSE PRACTITIONER

## 2024-05-20 PROCEDURE — 99214 OFFICE O/P EST MOD 30 MIN: CPT | Performed by: NURSE PRACTITIONER

## 2024-05-20 ASSESSMENT — ENCOUNTER SYMPTOMS
COUGH: 0
BACK PAIN: 1
SHORTNESS OF BREATH: 0

## 2024-05-20 NOTE — PROGRESS NOTES
Patient is present with  for 3 month f/u    Patient states she is having neck and back pain  States the pain radiates down into her hip and leg  States she has tingling in her leg  States they were unable to get a hold of anyone to schedule PT  
distress.      Breath sounds: Normal breath sounds.   Musculoskeletal:         General: No deformity. Normal range of motion.      Cervical back: Full passive range of motion without pain and normal range of motion.      Right hip: Tenderness present. No deformity or bony tenderness. Normal range of motion.   Skin:     General: Skin is warm and dry.   Neurological:      Mental Status: She is alert and oriented to person, place, and time.            An electronic signature was used to authenticate this note.    --DEEPTI FUENTES - CNP

## 2024-08-07 DIAGNOSIS — R12 HEARTBURN: ICD-10-CM

## 2024-08-07 RX ORDER — FAMOTIDINE 20 MG/1
20 TABLET, FILM COATED ORAL DAILY
Qty: 90 TABLET | Refills: 1 | Status: SHIPPED | OUTPATIENT
Start: 2024-08-07

## 2024-08-07 NOTE — TELEPHONE ENCOUNTER
Last visit: 05/20/24  Last Med refill: 05/09/24  Does patient have enough medication for 72 hours: Yes    Next Visit Date:  Future Appointments   Date Time Provider Department Center   8/26/2024  7:30 AM Mary Turk, APRN - CNP [facility]       Health Maintenance   Topic Date Due    Hepatitis B vaccine (1 of 3 - 3-dose series) Never done    HIV screen  Never done    DTaP/Tdap/Td vaccine (1 - Tdap) Never done    Shingles vaccine (1 of 2) Never done    COVID-19 Vaccine (3 - 2023-24 season) 09/01/2023    Flu vaccine (1) 08/01/2024    Annual Wellness Visit (Medicare)  08/15/2024    Depression Monitoring  01/30/2025    Breast cancer screen  08/10/2025    Lipids  02/07/2027    Colorectal Cancer Screen  02/08/2029    Hepatitis C screen  Completed    Hepatitis A vaccine  Aged Out    Hib vaccine  Aged Out    Polio vaccine  Aged Out    Meningococcal (ACWY) vaccine  Aged Out    Pneumococcal 0-64 years Vaccine  Aged Out    Diabetes screen  Discontinued       Hemoglobin A1C (%)   Date Value   01/29/2021 5.3   03/20/2017 5.5             ( goal A1C is < 7)   No components found for: \"LABMICR\"  No components found for: \"LDLCHOLESTEROL\", \"LDLCALC\"    (goal LDL is <100)   AST (U/L)   Date Value   02/07/2022 22     ALT (U/L)   Date Value   02/07/2022 19     BUN (mg/dL)   Date Value   02/06/2024 8     BP Readings from Last 3 Encounters:   05/20/24 110/64   02/06/24 114/66   02/08/24 117/74          (goal 120/80)    All Future Testing planned in [facility]  Lab Frequency Next Occurrence   COLONOSCOPY (Screening) Once 02/19/2024   XR ABDOMEN (2 VIEWS) Once 02/05/2024               Patient Active Problem List:     Deaf     GERD (gastroesophageal reflux disease)     Polyp of colon     Gastroesophageal reflux disease with esophagitis     Major depressive disorder, single episode, mild (HCC)     Altered bowel habits

## 2024-08-23 ENCOUNTER — TELEPHONE (OUTPATIENT)
Dept: FAMILY MEDICINE CLINIC | Age: 59
End: 2024-08-23

## 2024-08-23 NOTE — TELEPHONE ENCOUNTER
----- Message from Ifrah ARGENIS sent at 8/23/2024 10:12 AM EDT -----  Regarding: ECC  Needed  ECC  Needed    Reason for Request: Appointment Rescheduled  Type of : Deaf/Hard of Hearing  Language Needed: (Sign Language)  --------------------------------------------------------------------------------------------------------------------------    Scheduled Appointment Information: 7/12- interp confirmed-3mo  Appointment Date (MM/DD/YYYY):  October 18, 2024  Appointment Time (HH:DAGOBERTO): 9:00 AM  Provider Name: (Mary Turk APRN - CNP)    Reschedule Information (If Applicable)  Previous Appointment Date (MM/DD/YYYY): August 26, 2024      Additional Information: Patient's sister calleD to reschedule the appointment of patient because there's no someone to accompany the patient since her sister has surgery.  --------------------------------------------------------------------------------------------------------------------------    Relationship to Patient: Other (Sister Natalie)     Call Back Info: OK to leave message on voicemail  Preferred Call Back Number: Phone  [phone]      Note: This message is edited and re-sent.

## 2024-08-31 DIAGNOSIS — R51.9 CHRONIC INTRACTABLE HEADACHE, UNSPECIFIED HEADACHE TYPE: ICD-10-CM

## 2024-08-31 DIAGNOSIS — G89.29 CHRONIC INTRACTABLE HEADACHE, UNSPECIFIED HEADACHE TYPE: ICD-10-CM

## 2024-09-03 RX ORDER — AMITRIPTYLINE HYDROCHLORIDE 50 MG/1
TABLET ORAL
Qty: 30 TABLET | Refills: 5 | Status: SHIPPED | OUTPATIENT
Start: 2024-09-03

## 2024-09-03 NOTE — TELEPHONE ENCOUNTER
Last visit: 5/20/24  Last Med refill: 3/8/24  Does patient have enough medication for 72 hours: Yes    Next Visit Date:  Future Appointments   Date Time Provider Department Center   10/18/2024  9:00 AM Mary Turk, APRN - CNP [facility]       Health Maintenance   Topic Date Due    HIV screen  Never done    Hepatitis B vaccine (1 of 3 - 19+ 3-dose series) Never done    DTaP/Tdap/Td vaccine (1 - Tdap) Never done    Shingles vaccine (1 of 2) Never done    Flu vaccine (1) 08/01/2024    Annual Wellness Visit (Medicare)  08/15/2024    COVID-19 Vaccine (3 - 2023-24 season) 09/01/2024    Depression Monitoring  01/30/2025    Breast cancer screen  08/10/2025    Lipids  02/07/2027    Colorectal Cancer Screen  02/08/2029    Hepatitis C screen  Completed    Hepatitis A vaccine  Aged Out    Hib vaccine  Aged Out    Polio vaccine  Aged Out    Meningococcal (ACWY) vaccine  Aged Out    Pneumococcal 0-64 years Vaccine  Aged Out    Diabetes screen  Discontinued       Hemoglobin A1C (%)   Date Value   01/29/2021 5.3   03/20/2017 5.5             ( goal A1C is < 7)   No components found for: \"LABMICR\"  No components found for: \"LDLCHOLESTEROL\", \"LDLCALC\"    (goal LDL is <100)   AST (U/L)   Date Value   02/07/2022 22     ALT (U/L)   Date Value   02/07/2022 19     BUN (mg/dL)   Date Value   02/06/2024 8     BP Readings from Last 3 Encounters:   05/20/24 110/64   02/06/24 114/66   02/08/24 117/74          (goal 120/80)    All Future Testing planned in [facility]  Lab Frequency Next Occurrence   COLONOSCOPY (Screening) Once 02/19/2024   XR ABDOMEN (2 VIEWS) Once 02/05/2024               Patient Active Problem List:     Deaf     GERD (gastroesophageal reflux disease)     Polyp of colon     Gastroesophageal reflux disease with esophagitis     Major depressive disorder, single episode, mild (HCC)     Altered bowel habits

## 2024-09-11 ENCOUNTER — HOSPITAL ENCOUNTER (OUTPATIENT)
Dept: MAMMOGRAPHY | Age: 59
Discharge: HOME | End: 2024-09-13
Payer: MEDICARE

## 2024-09-11 VITALS — HEIGHT: 66 IN | WEIGHT: 179 LBS | BODY MASS INDEX: 28.77 KG/M2

## 2024-09-11 DIAGNOSIS — Z12.31 ENCOUNTER FOR SCREENING MAMMOGRAM FOR MALIGNANT NEOPLASM OF BREAST: ICD-10-CM

## 2024-09-11 PROCEDURE — 77063 BREAST TOMOSYNTHESIS BI: CPT

## 2024-10-18 ENCOUNTER — OFFICE VISIT (OUTPATIENT)
Dept: FAMILY MEDICINE CLINIC | Age: 59
End: 2024-10-18

## 2024-10-18 VITALS
WEIGHT: 179 LBS | BODY MASS INDEX: 28.89 KG/M2 | OXYGEN SATURATION: 98 % | SYSTOLIC BLOOD PRESSURE: 130 MMHG | DIASTOLIC BLOOD PRESSURE: 78 MMHG | HEART RATE: 90 BPM

## 2024-10-18 DIAGNOSIS — M25.552 LEFT HIP PAIN: Primary | ICD-10-CM

## 2024-10-18 DIAGNOSIS — F32.A MILD DEPRESSION: ICD-10-CM

## 2024-10-18 DIAGNOSIS — M54.50 LUMBAR PAIN: ICD-10-CM

## 2024-10-18 ASSESSMENT — PATIENT HEALTH QUESTIONNAIRE - PHQ9
SUM OF ALL RESPONSES TO PHQ QUESTIONS 1-9: 2
9. THOUGHTS THAT YOU WOULD BE BETTER OFF DEAD, OR OF HURTING YOURSELF: NOT AT ALL
3. TROUBLE FALLING OR STAYING ASLEEP: SEVERAL DAYS
SUM OF ALL RESPONSES TO PHQ9 QUESTIONS 1 & 2: 1
SUM OF ALL RESPONSES TO PHQ QUESTIONS 1-9: 2
4. FEELING TIRED OR HAVING LITTLE ENERGY: NOT AT ALL
SUM OF ALL RESPONSES TO PHQ QUESTIONS 1-9: 2
7. TROUBLE CONCENTRATING ON THINGS, SUCH AS READING THE NEWSPAPER OR WATCHING TELEVISION: NOT AT ALL
5. POOR APPETITE OR OVEREATING: NOT AT ALL
SUM OF ALL RESPONSES TO PHQ QUESTIONS 1-9: 2
10. IF YOU CHECKED OFF ANY PROBLEMS, HOW DIFFICULT HAVE THESE PROBLEMS MADE IT FOR YOU TO DO YOUR WORK, TAKE CARE OF THINGS AT HOME, OR GET ALONG WITH OTHER PEOPLE: NOT DIFFICULT AT ALL
2. FEELING DOWN, DEPRESSED OR HOPELESS: SEVERAL DAYS
6. FEELING BAD ABOUT YOURSELF - OR THAT YOU ARE A FAILURE OR HAVE LET YOURSELF OR YOUR FAMILY DOWN: NOT AT ALL
1. LITTLE INTEREST OR PLEASURE IN DOING THINGS: NOT AT ALL
8. MOVING OR SPEAKING SO SLOWLY THAT OTHER PEOPLE COULD HAVE NOTICED. OR THE OPPOSITE, BEING SO FIGETY OR RESTLESS THAT YOU HAVE BEEN MOVING AROUND A LOT MORE THAN USUAL: NOT AT ALL

## 2024-10-18 ASSESSMENT — ENCOUNTER SYMPTOMS
SHORTNESS OF BREATH: 0
COUGH: 0
BACK PAIN: 1

## 2024-10-18 NOTE — PROGRESS NOTES
Berna Sutton (:  1965) is a 59 y.o. female,Established patient, here for evaluation of the following chief complaint(s):  3 Month Follow-Up (Medication check ), Back Pain (States she feels swollen in her upper back between her shoulder blades. ), and Health Maintenance (Flu vaccine today, scheduled next month her for AWV. )       All communication done through   Assessment & Plan  Mild depression   Chronic, at goal (stable), continue current treatment plan         Left hip pain   Chronic, not at goal (unstable), need to reprint physical therapy order, they state the physical therapy department never called them back    Orders:    External Referral To Physical Therapy    Lumbar pain   Chronic, not at goal (unstable), physical therapy order reprinted    Orders:    External Referral To Physical Therapy      No follow-ups on file.       Subjective   Back Pain  Pertinent negatives include no chest pain or fever.       Review of Systems   Constitutional:  Negative for chills and fever.   Respiratory:  Negative for cough and shortness of breath.    Cardiovascular:  Negative for chest pain, palpitations and leg swelling.   Musculoskeletal:  Positive for back pain.     Objective   Vitals:    10/18/24 0916   BP: 130/78   Pulse: 90   SpO2: 98%       Physical Exam  Constitutional:       Appearance: She is well-developed.   HENT:      Right Ear: External ear normal.      Left Ear: External ear normal.      Nose: Nose normal.   Cardiovascular:      Rate and Rhythm: Normal rate and regular rhythm.      Heart sounds: Normal heart sounds, S1 normal and S2 normal.   Pulmonary:      Effort: Pulmonary effort is normal. No respiratory distress.      Breath sounds: Normal breath sounds.   Musculoskeletal:         General: No deformity. Normal range of motion.      Cervical back: Full passive range of motion without pain and normal range of motion.   Skin:     General: Skin is warm and dry.   Neurological:

## 2024-11-04 ENCOUNTER — TELEPHONE (OUTPATIENT)
Dept: FAMILY MEDICINE CLINIC | Age: 59
End: 2024-11-04

## 2024-11-04 NOTE — TELEPHONE ENCOUNTER
----- Message from Heather DAMON sent at 11/4/2024  8:41 AM EST -----  Regarding: [facility] Appointment Request  [facility] Appointment Request    Patient needs appointment for [facility] Appointment Type: Annual Visit.    Patient Requested Dates(s): 11/11/2024  Patient Requested Time: 2:30  Provider Name: Mary Turk APRN - CNP      Reason for Appointment Request: Patient wants to reschedule canceled appointment today but no availability.    --------------------------------------------------------------------------------------------------------------------------    Relationship to Patient: Sister     Call Back Information: OK to leave message on voicemail  Preferred Call Back Number: Phone [phone]

## 2024-11-12 ENCOUNTER — OFFICE VISIT (OUTPATIENT)
Dept: FAMILY MEDICINE CLINIC | Age: 59
End: 2024-11-12
Payer: MEDICARE

## 2024-11-12 VITALS
OXYGEN SATURATION: 98 % | SYSTOLIC BLOOD PRESSURE: 134 MMHG | WEIGHT: 175 LBS | HEART RATE: 110 BPM | DIASTOLIC BLOOD PRESSURE: 80 MMHG | BODY MASS INDEX: 28.12 KG/M2 | HEIGHT: 66 IN

## 2024-11-12 DIAGNOSIS — Z00.00 MEDICARE ANNUAL WELLNESS VISIT, SUBSEQUENT: Primary | ICD-10-CM

## 2024-11-12 DIAGNOSIS — F32.A MILD DEPRESSION: ICD-10-CM

## 2024-11-12 PROCEDURE — G8484 FLU IMMUNIZE NO ADMIN: HCPCS | Performed by: NURSE PRACTITIONER

## 2024-11-12 PROCEDURE — G0439 PPPS, SUBSEQ VISIT: HCPCS | Performed by: NURSE PRACTITIONER

## 2024-11-12 PROCEDURE — 3017F COLORECTAL CA SCREEN DOC REV: CPT | Performed by: NURSE PRACTITIONER

## 2024-11-12 RX ORDER — KETOTIFEN FUMARATE 0.35 MG/ML
1 SOLUTION/ DROPS OPHTHALMIC 2 TIMES DAILY
Qty: 1 ML | Refills: 0 | Status: SHIPPED | OUTPATIENT
Start: 2024-11-12 | End: 2024-11-22

## 2024-11-12 ASSESSMENT — PATIENT HEALTH QUESTIONNAIRE - PHQ9
2. FEELING DOWN, DEPRESSED OR HOPELESS: NOT AT ALL
1. LITTLE INTEREST OR PLEASURE IN DOING THINGS: NOT AT ALL
SUM OF ALL RESPONSES TO PHQ QUESTIONS 1-9: 0
9. THOUGHTS THAT YOU WOULD BE BETTER OFF DEAD, OR OF HURTING YOURSELF: NOT AT ALL
6. FEELING BAD ABOUT YOURSELF - OR THAT YOU ARE A FAILURE OR HAVE LET YOURSELF OR YOUR FAMILY DOWN: NOT AT ALL
SUM OF ALL RESPONSES TO PHQ9 QUESTIONS 1 & 2: 0
5. POOR APPETITE OR OVEREATING: NOT AT ALL
7. TROUBLE CONCENTRATING ON THINGS, SUCH AS READING THE NEWSPAPER OR WATCHING TELEVISION: NOT AT ALL
10. IF YOU CHECKED OFF ANY PROBLEMS, HOW DIFFICULT HAVE THESE PROBLEMS MADE IT FOR YOU TO DO YOUR WORK, TAKE CARE OF THINGS AT HOME, OR GET ALONG WITH OTHER PEOPLE: NOT DIFFICULT AT ALL
SUM OF ALL RESPONSES TO PHQ QUESTIONS 1-9: 0
3. TROUBLE FALLING OR STAYING ASLEEP: NOT AT ALL
8. MOVING OR SPEAKING SO SLOWLY THAT OTHER PEOPLE COULD HAVE NOTICED. OR THE OPPOSITE, BEING SO FIGETY OR RESTLESS THAT YOU HAVE BEEN MOVING AROUND A LOT MORE THAN USUAL: NOT AT ALL
4. FEELING TIRED OR HAVING LITTLE ENERGY: NOT AT ALL

## 2024-11-12 NOTE — PROGRESS NOTES
Medicare Annual Wellness Visit    Berna Sutton is here for Medicare AWV    Assessment & Plan   Mild depression  -     sertraline (ZOLOFT) 50 MG tablet; Take 1 tablet by mouth daily, Disp-30 tablet, R-5Normal    Recommendations for Preventive Services Due: see orders and patient instructions/AVS.  Recommended screening schedule for the next 5-10 years is provided to the patient in written form: see Patient Instructions/AVS.     No follow-ups on file.     Subjective       Patient's complete Health Risk Assessment and screening values have been reviewed and are found in Flowsheets. The following problems were reviewed today and where indicated follow up appointments were made and/or referrals ordered.    Positive Risk Factor Screenings with Interventions:    Fall Risk:  Do you feel unsteady or are you worried about falling? : no  2 or more falls in past year?: (!) yes (tripped outside; fell in the kitchen)  Fall with injury in past year?: no     Interventions:    Reviewed medications, home hazards, visual acuity, and co-morbidities that can increase risk for falls                Dentist Screen:  Have you seen the dentist within the past year?: (!) No    Intervention:    Her sister is working on getting her to go but she has a fit about going and does not want false teeth.      Vision Screen:  Do you have difficulty driving, watching TV, or doing any of your daily activities because of your eyesight?: (!) Yes (sees an eye doctor next month, states her eyes get bloodshot frequently)  Have you had an eye exam within the past year?: Yes  Interventions:   She is due next month.      ADL's:   Patient reports needing help with:  Select all that apply: (!) Transportation  Interventions:  She does have a car and knows how to drive.                   Objective   Vitals:    11/12/24 1359   BP: 134/80   Site: Left Upper Arm   Position: Sitting   Cuff Size: Large Adult   Pulse: (!) 110   SpO2: 98%   Weight: 79.4 kg (175 lb)

## 2025-01-29 ENCOUNTER — OFFICE VISIT (OUTPATIENT)
Dept: FAMILY MEDICINE CLINIC | Age: 60
End: 2025-01-29
Payer: MEDICARE

## 2025-01-29 VITALS
BODY MASS INDEX: 27.92 KG/M2 | DIASTOLIC BLOOD PRESSURE: 76 MMHG | WEIGHT: 173 LBS | HEART RATE: 95 BPM | OXYGEN SATURATION: 99 % | TEMPERATURE: 97.6 F | SYSTOLIC BLOOD PRESSURE: 110 MMHG

## 2025-01-29 DIAGNOSIS — R12 HEARTBURN: ICD-10-CM

## 2025-01-29 DIAGNOSIS — J06.9 URI WITH COUGH AND CONGESTION: Primary | ICD-10-CM

## 2025-01-29 PROCEDURE — G8427 DOCREV CUR MEDS BY ELIG CLIN: HCPCS | Performed by: STUDENT IN AN ORGANIZED HEALTH CARE EDUCATION/TRAINING PROGRAM

## 2025-01-29 PROCEDURE — 1036F TOBACCO NON-USER: CPT | Performed by: STUDENT IN AN ORGANIZED HEALTH CARE EDUCATION/TRAINING PROGRAM

## 2025-01-29 PROCEDURE — 3017F COLORECTAL CA SCREEN DOC REV: CPT | Performed by: STUDENT IN AN ORGANIZED HEALTH CARE EDUCATION/TRAINING PROGRAM

## 2025-01-29 PROCEDURE — 99213 OFFICE O/P EST LOW 20 MIN: CPT | Performed by: STUDENT IN AN ORGANIZED HEALTH CARE EDUCATION/TRAINING PROGRAM

## 2025-01-29 PROCEDURE — G8417 CALC BMI ABV UP PARAM F/U: HCPCS | Performed by: STUDENT IN AN ORGANIZED HEALTH CARE EDUCATION/TRAINING PROGRAM

## 2025-01-29 RX ORDER — ALBUTEROL SULFATE 90 UG/1
2 INHALANT RESPIRATORY (INHALATION) 4 TIMES DAILY PRN
Qty: 18 G | Refills: 0 | Status: SHIPPED | OUTPATIENT
Start: 2025-01-29

## 2025-01-29 RX ORDER — BENZONATATE 100 MG/1
100 CAPSULE ORAL EVERY 6 HOURS PRN
Qty: 30 CAPSULE | Refills: 0 | Status: SHIPPED | OUTPATIENT
Start: 2025-01-29 | End: 2025-02-08

## 2025-01-29 SDOH — ECONOMIC STABILITY: FOOD INSECURITY: WITHIN THE PAST 12 MONTHS, THE FOOD YOU BOUGHT JUST DIDN'T LAST AND YOU DIDN'T HAVE MONEY TO GET MORE.: NEVER TRUE

## 2025-01-29 SDOH — ECONOMIC STABILITY: FOOD INSECURITY: WITHIN THE PAST 12 MONTHS, YOU WORRIED THAT YOUR FOOD WOULD RUN OUT BEFORE YOU GOT MONEY TO BUY MORE.: NEVER TRUE

## 2025-01-29 ASSESSMENT — PATIENT HEALTH QUESTIONNAIRE - PHQ9
SUM OF ALL RESPONSES TO PHQ QUESTIONS 1-9: 3
10. IF YOU CHECKED OFF ANY PROBLEMS, HOW DIFFICULT HAVE THESE PROBLEMS MADE IT FOR YOU TO DO YOUR WORK, TAKE CARE OF THINGS AT HOME, OR GET ALONG WITH OTHER PEOPLE: NOT DIFFICULT AT ALL
6. FEELING BAD ABOUT YOURSELF - OR THAT YOU ARE A FAILURE OR HAVE LET YOURSELF OR YOUR FAMILY DOWN: NOT AT ALL
SUM OF ALL RESPONSES TO PHQ9 QUESTIONS 1 & 2: 1
8. MOVING OR SPEAKING SO SLOWLY THAT OTHER PEOPLE COULD HAVE NOTICED. OR THE OPPOSITE, BEING SO FIGETY OR RESTLESS THAT YOU HAVE BEEN MOVING AROUND A LOT MORE THAN USUAL: NOT AT ALL
5. POOR APPETITE OR OVEREATING: NOT AT ALL
1. LITTLE INTEREST OR PLEASURE IN DOING THINGS: NOT AT ALL
SUM OF ALL RESPONSES TO PHQ QUESTIONS 1-9: 3
SUM OF ALL RESPONSES TO PHQ QUESTIONS 1-9: 3
9. THOUGHTS THAT YOU WOULD BE BETTER OFF DEAD, OR OF HURTING YOURSELF: NOT AT ALL
SUM OF ALL RESPONSES TO PHQ QUESTIONS 1-9: 3
3. TROUBLE FALLING OR STAYING ASLEEP: SEVERAL DAYS
4. FEELING TIRED OR HAVING LITTLE ENERGY: SEVERAL DAYS
2. FEELING DOWN, DEPRESSED OR HOPELESS: SEVERAL DAYS

## 2025-01-29 ASSESSMENT — ENCOUNTER SYMPTOMS
COUGH: 1
SORE THROAT: 1

## 2025-01-29 NOTE — PROGRESS NOTES
Berna Sutton (:  1965) is a 60 y.o. female,Established patient, here for evaluation of the following chief complaint(s):  Cough (Mostly cough , sore throat, with runny nose for a 3 weeks. She has tried OTC med.)         Assessment & Plan  URI with cough and congestion   Ongoing cough and congestion/rhinorrhea for 3 weeks now without improvement on cough, per patient sore throat and feels very dry with the cough, fever 3 weeks ago which has gone a way  -cough has not worsened but has not improved  -s/p robitussin and nyquill  PE: vitals reviewed, stable, Mild wheezing noted on exam of bilateral upper lobes without difficulty breathing or shortness of breath, congestion noted   Will trial albuterol for symptom improvement along with tessalon perles, discussed with patient to use mucinex to help with symptoms   Discussed symptomatic treatment with warm liquids, fluids and honey to help with cough, discussed cough can last around 8 weeks until improvement   Discussed if symptoms don't improve or worsen to follow back up            No follow-ups on file.       Subjective   60 y rold here due to concerns of sore throat, coughhas been hurting her chest when she is coughing. Symptoms started 3 weeks ago in which patient started off with a fever, runny nose, sore throat and phlegm,denies feeling congestion in her chest, 3 weeks ago her temperature was 101.9. Per patient has tried   Robatussin, nyquill. Pill helps her symptoms. Has not tested herself for any upper respiratory infection, denies any   No shortness of breath, or difficulty breathing.                 Review of Systems   HENT:  Positive for congestion and sore throat.    Respiratory:  Positive for cough.    All other systems reviewed and are negative.         Objective   Physical Exam  Vitals reviewed.   Constitutional:       Appearance: Normal appearance.   HENT:      Head: Normocephalic and atraumatic.      Nose: Congestion present.

## 2025-01-29 NOTE — TELEPHONE ENCOUNTER
Last visit: 11/12/24  Last Med refill: 11/02/214  Does patient have enough medication for 72 hours: Yes    Next Visit Date:  Future Appointments   Date Time Provider Department Center   1/29/2025  9:30 AM Shanika Todd MD [facility] DEP   5/12/2025  1:30 PM Mary Turk APRN - CNP [facility] DEP   11/17/2025  2:00 PM Mary Turk APRN - CNP [facility] DEP       Health Maintenance   Topic Date Due    HIV screen  Never done    DTaP/Tdap/Td vaccine (1 - Tdap) Never done    Shingles vaccine (1 of 2) Never done    COVID-19 Vaccine (3 - 2023-24 season) 09/01/2024    Depression Monitoring  11/12/2025    Annual Wellness Visit (Medicare)  11/13/2025    Breast cancer screen  09/11/2026    Lipids  02/07/2027    Colorectal Cancer Screen  02/08/2029    Respiratory Syncytial Virus (RSV) Pregnant or age 60 yrs+ (1 - 1-dose 75+ series) 01/21/2040    Flu vaccine  Completed    Hepatitis C screen  Completed    Hepatitis A vaccine  Aged Out    Hepatitis B vaccine  Aged Out    Hib vaccine  Aged Out    Polio vaccine  Aged Out    Meningococcal (ACWY) vaccine  Aged Out    Pneumococcal 0-64 years Vaccine  Aged Out    Diabetes screen  Discontinued       Hemoglobin A1C (%)   Date Value   01/29/2021 5.3   03/20/2017 5.5             ( goal A1C is < 7)   No components found for: \"LABMICR\"  No components found for: \"LDLCHOLESTEROL\", \"LDLCALC\"    (goal LDL is <100)   AST (U/L)   Date Value   02/07/2022 22     ALT (U/L)   Date Value   02/07/2022 19     BUN (mg/dL)   Date Value   02/06/2024 8     BP Readings from Last 3 Encounters:   11/12/24 134/80   10/18/24 130/78   05/20/24 110/64          (goal 120/80)    All Future Testing planned in [facility]  Lab Frequency Next Occurrence   COLONOSCOPY (Screening) Once 02/19/2024   XR ABDOMEN (2 VIEWS) Once 02/05/2024               Patient Active Problem List:     Deaf     GERD (gastroesophageal reflux disease)     Polyp of colon     Gastroesophageal reflux

## 2025-01-30 RX ORDER — FAMOTIDINE 20 MG/1
20 TABLET, FILM COATED ORAL DAILY
Qty: 90 TABLET | Refills: 1 | Status: SHIPPED | OUTPATIENT
Start: 2025-01-30

## 2025-02-28 DIAGNOSIS — G89.29 CHRONIC INTRACTABLE HEADACHE, UNSPECIFIED HEADACHE TYPE: ICD-10-CM

## 2025-02-28 DIAGNOSIS — R51.9 CHRONIC INTRACTABLE HEADACHE, UNSPECIFIED HEADACHE TYPE: ICD-10-CM

## 2025-02-28 RX ORDER — AMITRIPTYLINE HYDROCHLORIDE 50 MG/1
TABLET ORAL
Qty: 30 TABLET | Refills: 5 | Status: SHIPPED | OUTPATIENT
Start: 2025-02-28

## 2025-02-28 NOTE — TELEPHONE ENCOUNTER
Last visit: 1/29/25  Last Med refill: 9/3/24  Does patient have enough medication for 72 hours: Yes    Next Visit Date:  Future Appointments   Date Time Provider Department Center   5/12/2025  1:30 PM Mary Turk APRN - CNP [facility] ECC DEP   11/17/2025  2:00 PM Mary Turk APRN - CNP [facility] DEP       Health Maintenance   Topic Date Due    DTaP/Tdap/Td vaccine (1 - Tdap) Never done    Pneumococcal 50+ years Vaccine (1 of 1 - PCV) Never done    Shingles vaccine (1 of 2) 01/29/2026 (Originally 1/21/2015)    COVID-19 Vaccine (3 - 2024-25 season) 01/29/2026 (Originally 9/1/2024)    Annual Wellness Visit (Medicare)  11/13/2025    Depression Monitoring  01/29/2026    Breast cancer screen  09/11/2026    Lipids  02/07/2027    Colorectal Cancer Screen  02/08/2029    Respiratory Syncytial Virus (RSV) Pregnant or age 60 yrs+ (1 - 1-dose 75+ series) 01/21/2040    Flu vaccine  Completed    Hepatitis C screen  Completed    Hepatitis A vaccine  Aged Out    Hepatitis B vaccine  Aged Out    Hib vaccine  Aged Out    Polio vaccine  Aged Out    Meningococcal (ACWY) vaccine  Aged Out    Diabetes screen  Discontinued    HIV screen  Discontinued       Hemoglobin A1C (%)   Date Value   01/29/2021 5.3   03/20/2017 5.5             ( goal A1C is < 7)   No components found for: \"LABMICR\"  No components found for: \"LDLCHOLESTEROL\", \"LDLCALC\"    (goal LDL is <100)   AST (U/L)   Date Value   02/07/2022 22     ALT (U/L)   Date Value   02/07/2022 19     BUN (mg/dL)   Date Value   02/06/2024 8     BP Readings from Last 3 Encounters:   01/29/25 110/76   11/12/24 134/80   10/18/24 130/78          (goal 120/80)    All Future Testing planned in [facility]  Lab Frequency Next Occurrence               Patient Active Problem List:     Deaf     GERD (gastroesophageal reflux disease)     Polyp of colon     Gastroesophageal reflux disease with esophagitis     Major depressive disorder, single episode, mild

## 2025-03-11 ENCOUNTER — HOSPITAL ENCOUNTER (OUTPATIENT)
Age: 60
Discharge: HOME | End: 2025-03-11
Payer: MEDICARE

## 2025-03-11 ENCOUNTER — HOSPITAL ENCOUNTER (OUTPATIENT)
Dept: GENERAL RADIOLOGY | Age: 60
Discharge: HOME | End: 2025-03-13
Payer: MEDICARE

## 2025-03-11 ENCOUNTER — HOSPITAL ENCOUNTER (OUTPATIENT)
Age: 60
Discharge: HOME | End: 2025-03-13
Payer: MEDICARE

## 2025-03-11 ENCOUNTER — OFFICE VISIT (OUTPATIENT)
Dept: FAMILY MEDICINE CLINIC | Age: 60
End: 2025-03-11
Payer: MEDICARE

## 2025-03-11 VITALS
HEART RATE: 132 BPM | OXYGEN SATURATION: 96 % | TEMPERATURE: 97.9 F | DIASTOLIC BLOOD PRESSURE: 84 MMHG | BODY MASS INDEX: 27.28 KG/M2 | SYSTOLIC BLOOD PRESSURE: 110 MMHG | WEIGHT: 169 LBS

## 2025-03-11 DIAGNOSIS — B34.9 VIRAL ILLNESS: Primary | ICD-10-CM

## 2025-03-11 DIAGNOSIS — R10.84 GENERALIZED ABDOMINAL PAIN: ICD-10-CM

## 2025-03-11 DIAGNOSIS — R05.1 ACUTE COUGH: ICD-10-CM

## 2025-03-11 LAB
EXP DATE SOLUTION: NORMAL
EXP DATE SWAB: NORMAL
EXPIRATION DATE: NORMAL
INFLUENZA A RNA, POC: NORMAL
INFLUENZA B RNA, POC: NORMAL
LOT NUMBER POC: NORMAL
LOT NUMBER SOLUTION: NORMAL
LOT NUMBER SWAB: NORMAL
SARS-COV-2 RNA, POC: NEGATIVE
VALID INTERNAL CONTROL: NORMAL

## 2025-03-11 PROCEDURE — G8417 CALC BMI ABV UP PARAM F/U: HCPCS | Performed by: NURSE PRACTITIONER

## 2025-03-11 PROCEDURE — 71046 X-RAY EXAM CHEST 2 VIEWS: CPT

## 2025-03-11 PROCEDURE — 1036F TOBACCO NON-USER: CPT | Performed by: NURSE PRACTITIONER

## 2025-03-11 PROCEDURE — 3017F COLORECTAL CA SCREEN DOC REV: CPT | Performed by: NURSE PRACTITIONER

## 2025-03-11 PROCEDURE — 99214 OFFICE O/P EST MOD 30 MIN: CPT | Performed by: NURSE PRACTITIONER

## 2025-03-11 PROCEDURE — G8427 DOCREV CUR MEDS BY ELIG CLIN: HCPCS | Performed by: NURSE PRACTITIONER

## 2025-03-11 PROCEDURE — 87636 SARSCOV2 & INF A&B AMP PRB: CPT | Performed by: NURSE PRACTITIONER

## 2025-03-11 NOTE — PROGRESS NOTES
Berna Sutton (:  1965) is a 60 y.o. female,Established patient, here for evaluation of the following chief complaint(s):  Cough (Was seen on  and the cough has not gone away/States the tessalon or mucinex did not help/Had temp of 102.5 last night and body aches)         Assessment & Plan  Viral illness   Discussed viral nature of illness, no antibiotics needed, cough may last 2-3 weeks. Sleep with head propped up, water at bedside, humidifier in room, 1-2 teaspoons of honey prior to bed, nasal saline as needed for congestion.  Tylenol/ibuprofen for discomfort.  RTO if symptoms worsen or fail to improve.    Orders:    AMB POC COVID-19 & INFLUENZA A/B    Generalized abdominal pain            Acute cough       Orders:    AMB POC COVID-19 & INFLUENZA A/B    XR CHEST (2 VW); Future      No follow-ups on file.       Subjective   Cough  Pertinent negatives include no chest pain, chills, fever or shortness of breath.       Review of Systems   Constitutional:  Negative for chills and fever.   Respiratory:  Positive for cough. Negative for shortness of breath.    Cardiovascular:  Negative for chest pain, palpitations and leg swelling.          Objective   Vitals:    25 1353   BP: 110/84   Pulse: (!) 132   Temp: 97.9 °F (36.6 °C)   SpO2: 96%     Wt Readings from Last 3 Encounters:   25 76.7 kg (169 lb)   25 78.5 kg (173 lb)   24 79.4 kg (175 lb)       Physical Exam  Constitutional:       Appearance: She is well-developed.   HENT:      Right Ear: External ear normal.      Left Ear: External ear normal.      Nose: Nose normal.   Cardiovascular:      Rate and Rhythm: Normal rate and regular rhythm.      Heart sounds: Normal heart sounds, S1 normal and S2 normal.   Pulmonary:      Effort: Pulmonary effort is normal. No respiratory distress.      Breath sounds: Normal breath sounds.   Musculoskeletal:         General: No deformity. Normal range of motion.      Cervical back: Full passive

## 2025-03-13 ENCOUNTER — RESULTS FOLLOW-UP (OUTPATIENT)
Dept: GENERAL RADIOLOGY | Age: 60
End: 2025-03-13

## 2025-03-14 NOTE — RESULT ENCOUNTER NOTE
If she is not voiding normally it may be a good idea to take her to the er, they may need to give her fluids and definitely check labs.

## 2025-05-12 ENCOUNTER — OFFICE VISIT (OUTPATIENT)
Dept: FAMILY MEDICINE CLINIC | Age: 60
End: 2025-05-12
Payer: MEDICARE

## 2025-05-12 VITALS
BODY MASS INDEX: 27.92 KG/M2 | OXYGEN SATURATION: 98 % | DIASTOLIC BLOOD PRESSURE: 80 MMHG | SYSTOLIC BLOOD PRESSURE: 122 MMHG | WEIGHT: 173 LBS | HEART RATE: 104 BPM

## 2025-05-12 DIAGNOSIS — H91.93 BILATERAL DEAFNESS: Primary | ICD-10-CM

## 2025-05-12 DIAGNOSIS — F32.A MILD DEPRESSION: ICD-10-CM

## 2025-05-12 DIAGNOSIS — M54.50 LUMBAR PAIN: ICD-10-CM

## 2025-05-12 PROCEDURE — 99214 OFFICE O/P EST MOD 30 MIN: CPT | Performed by: NURSE PRACTITIONER

## 2025-05-12 PROCEDURE — 1036F TOBACCO NON-USER: CPT | Performed by: NURSE PRACTITIONER

## 2025-05-12 PROCEDURE — 3017F COLORECTAL CA SCREEN DOC REV: CPT | Performed by: NURSE PRACTITIONER

## 2025-05-12 PROCEDURE — G8417 CALC BMI ABV UP PARAM F/U: HCPCS | Performed by: NURSE PRACTITIONER

## 2025-05-12 PROCEDURE — G8427 DOCREV CUR MEDS BY ELIG CLIN: HCPCS | Performed by: NURSE PRACTITIONER

## 2025-05-12 RX ORDER — MELOXICAM 7.5 MG/1
7.5 TABLET ORAL DAILY
Qty: 30 TABLET | Refills: 2 | Status: SHIPPED | OUTPATIENT
Start: 2025-05-12

## 2025-05-12 ASSESSMENT — ENCOUNTER SYMPTOMS
COUGH: 0
SHORTNESS OF BREATH: 0

## 2025-05-12 NOTE — PROGRESS NOTES
Berna Sutton (:  1965) is a 60 y.o. female,Established patient, here for evaluation of the following chief complaint(s):  6 Month Follow-Up    Assessment & Plan  Mild depression   Chronic, at goal (stable), continue current treatment plan    Orders:    sertraline (ZOLOFT) 50 MG tablet; Take 1 tablet by mouth daily    Bilateral deafness            Lumbar pain       Orders:    meloxicam (MOBIC) 7.5 MG tablet; Take 1 tablet by mouth daily      No follow-ups on file.       Subjective   HPI  Healing laceration to left anterior shin.  Berna thinks it has been there a long time, she has been taking pics and it appears it dates back to .  Appears to be healing.  No signs of infection.  Will continue to monitor.     Back still hurts at times.  Her sister reports that she has been complaining of pain less since doing pt.  She recently moved a dryer on her own. We have discussed good body mechanics.  Will trial her on meloxicam to see if this helps.  Instructed to take once daily with food.     Review of Systems   Constitutional:  Negative for chills and fever.   Respiratory:  Negative for cough and shortness of breath.    Cardiovascular:  Negative for chest pain, palpitations and leg swelling.     Objective   Vitals:    25 1330   BP: 122/80   Pulse: (!) 104   SpO2: 98%     Wt Readings from Last 3 Encounters:   25 78.5 kg (173 lb)   25 76.7 kg (169 lb)   25 78.5 kg (173 lb)       Physical Exam  Constitutional:       Appearance: She is well-developed.   HENT:      Right Ear: External ear normal.      Left Ear: External ear normal.      Nose: Nose normal.   Cardiovascular:      Rate and Rhythm: Normal rate and regular rhythm.      Heart sounds: Normal heart sounds, S1 normal and S2 normal.   Pulmonary:      Effort: Pulmonary effort is normal. No respiratory distress.      Breath sounds: Normal breath sounds.   Musculoskeletal:         General: No deformity. Normal range of

## 2025-07-26 DIAGNOSIS — R12 HEARTBURN: ICD-10-CM

## 2025-07-28 RX ORDER — FAMOTIDINE 20 MG/1
20 TABLET, FILM COATED ORAL DAILY
Qty: 90 TABLET | Refills: 1 | Status: SHIPPED | OUTPATIENT
Start: 2025-07-28

## 2025-07-28 NOTE — TELEPHONE ENCOUNTER
Last visit: 5/12/2025  Last Med refill: 01.30.2025 for 6 months   Does patient have enough medication for 72 hours: No    Next Visit Date:  Future Appointments   Date Time Provider Department Center   11/17/2025  2:00 PM Mary Turk, APRN - CNP [facility] ECC DEP       Health Maintenance   Topic Date Due    DTaP/Tdap/Td vaccine (1 - Tdap) Never done    Pneumococcal 50+ years Vaccine (1 of 1 - PCV) Never done    Shingles vaccine (1 of 2) 01/29/2026 (Originally 1/21/2015)    COVID-19 Vaccine (3 - 2024-25 season) 01/29/2026 (Originally 9/1/2024)    Flu vaccine (1) 08/01/2025    Annual Wellness Visit (Medicare)  11/13/2025    Depression Monitoring  01/29/2026    Breast cancer screen  09/11/2026    Lipids  02/07/2027    Colorectal Cancer Screen  02/08/2029    Respiratory Syncytial Virus (RSV) Pregnant or age 60 yrs+ (1 - 1-dose 75+ series) 01/21/2040    Hepatitis C screen  Completed    Hepatitis A vaccine  Aged Out    Hepatitis B vaccine  Aged Out    Hib vaccine  Aged Out    Polio vaccine  Aged Out    Meningococcal (ACWY) vaccine  Aged Out    Meningococcal B vaccine  Aged Out    Diabetes screen  Discontinued    HIV screen  Discontinued       Hemoglobin A1C (%)   Date Value   01/29/2021 5.3   03/20/2017 5.5             ( goal A1C is < 7)   No components found for: \"LABMICR\"  No components found for: \"LDLCHOLESTEROL\", \"LDLCALC\"    (goal LDL is <100)   AST (U/L)   Date Value   02/07/2022 22     ALT (U/L)   Date Value   02/07/2022 19     BUN (mg/dL)   Date Value   02/06/2024 8     BP Readings from Last 3 Encounters:   05/12/25 122/80   03/11/25 110/84   01/29/25 110/76          (goal 120/80)    All Future Testing planned in [facility]  Lab Frequency Next Occurrence               Patient Active Problem List:     Deaf     GERD (gastroesophageal reflux disease)     Polyp of colon     Gastroesophageal reflux disease with esophagitis     Major depressive disorder, single episode, mild     Altered bowel habits

## 2025-08-22 DIAGNOSIS — M54.50 LUMBAR PAIN: ICD-10-CM

## 2025-08-28 RX ORDER — MELOXICAM 7.5 MG/1
7.5 TABLET ORAL DAILY
Qty: 90 TABLET | Refills: 1 | Status: SHIPPED | OUTPATIENT
Start: 2025-08-28

## (undated) DEVICE — SNARE ENDOSCP L240CM LOOP W13MM DIA2.4MM SHT THROW SM OVL

## (undated) DEVICE — POLYP TRAP: Brand: TRAPEASE®

## (undated) DEVICE — FORCEPS BX L240CM WRK CHN 2.8MM STD CAP W/ NDL MIC MESH

## (undated) DEVICE — CUP MED 1OZ CLR POLYPR FEED GRAD W/O LID

## (undated) DEVICE — ENDO KIT W/SYRINGE: Brand: MEDLINE INDUSTRIES, INC.

## (undated) DEVICE — BITEBLOCK 54FR W/ DENT RIM BLOX

## (undated) DEVICE — NO USE 18 MONTHS GOWN ISOL XL YEL LF

## (undated) DEVICE — DEFENDO AIR WATER SUCTION AND BIOPSY VALVE KIT FOR  OLYMPUS: Brand: DEFENDO AIR/WATER/SUCTION AND BIOPSY VALVE